# Patient Record
Sex: MALE | Race: WHITE | Employment: UNEMPLOYED | URBAN - METROPOLITAN AREA
[De-identification: names, ages, dates, MRNs, and addresses within clinical notes are randomized per-mention and may not be internally consistent; named-entity substitution may affect disease eponyms.]

---

## 2023-06-28 ENCOUNTER — APPOINTMENT (OUTPATIENT)
Dept: GENERAL RADIOLOGY | Facility: HOSPITAL | Age: 58
End: 2023-06-28
Attending: STUDENT IN AN ORGANIZED HEALTH CARE EDUCATION/TRAINING PROGRAM
Payer: MEDICAID

## 2023-06-28 ENCOUNTER — HOSPITAL ENCOUNTER (INPATIENT)
Facility: HOSPITAL | Age: 58
End: 2023-06-28
Attending: STUDENT IN AN ORGANIZED HEALTH CARE EDUCATION/TRAINING PROGRAM | Admitting: HOSPITALIST
Payer: MEDICAID

## 2023-06-28 ENCOUNTER — HOSPITAL ENCOUNTER (INPATIENT)
Facility: HOSPITAL | Age: 58
LOS: 6 days | Discharge: HOME | End: 2023-07-04
Attending: STUDENT IN AN ORGANIZED HEALTH CARE EDUCATION/TRAINING PROGRAM | Admitting: HOSPITALIST
Payer: MEDICAID

## 2023-06-28 DIAGNOSIS — K92.2 UPPER GI BLEED: Primary | ICD-10-CM

## 2023-06-28 LAB
ALBUMIN SERPL-MCNC: 2.9 G/DL (ref 3.4–5)
ALBUMIN/GLOB SERPL: 0.8 {RATIO} (ref 1–2)
ALP LIVER SERPL-CCNC: 85 U/L
ALT SERPL-CCNC: 15 U/L
ANION GAP SERPL CALC-SCNC: 6 MMOL/L (ref 0–18)
ANTIBODY SCREEN: NEGATIVE
AST SERPL-CCNC: 8 U/L (ref 15–37)
BILIRUB SERPL-MCNC: 0.4 MG/DL (ref 0.1–2)
BUN BLD-MCNC: 15 MG/DL (ref 7–18)
BUN/CREAT SERPL: 21.4 (ref 10–20)
CALCIUM BLD-MCNC: 8.2 MG/DL (ref 8.5–10.1)
CHLORIDE SERPL-SCNC: 109 MMOL/L (ref 98–112)
CO2 SERPL-SCNC: 24 MMOL/L (ref 21–32)
CREAT BLD-MCNC: 0.7 MG/DL
GFR SERPLBLD BASED ON 1.73 SQ M-ARVRAT: 107 ML/MIN/1.73M2 (ref 60–?)
GLOBULIN PLAS-MCNC: 3.8 G/DL (ref 2.8–4.4)
GLUCOSE BLD-MCNC: 108 MG/DL (ref 70–99)
GLUCOSE BLDC GLUCOMTR-MCNC: 97 MG/DL (ref 70–99)
OSMOLALITY SERPL CALC.SUM OF ELEC: 289 MOSM/KG (ref 275–295)
POTASSIUM SERPL-SCNC: 4.1 MMOL/L (ref 3.5–5.1)
PROT SERPL-MCNC: 6.7 G/DL (ref 6.4–8.2)
RH BLOOD TYPE: POSITIVE
RH BLOOD TYPE: POSITIVE
SODIUM SERPL-SCNC: 139 MMOL/L (ref 136–145)

## 2023-06-28 PROCEDURE — 30233N1 TRANSFUSION OF NONAUTOLOGOUS RED BLOOD CELLS INTO PERIPHERAL VEIN, PERCUTANEOUS APPROACH: ICD-10-PCS | Performed by: STUDENT IN AN ORGANIZED HEALTH CARE EDUCATION/TRAINING PROGRAM

## 2023-06-28 PROCEDURE — 99223 1ST HOSP IP/OBS HIGH 75: CPT | Performed by: HOSPITALIST

## 2023-06-28 PROCEDURE — 71045 X-RAY EXAM CHEST 1 VIEW: CPT | Performed by: STUDENT IN AN ORGANIZED HEALTH CARE EDUCATION/TRAINING PROGRAM

## 2023-06-28 RX ORDER — PROCHLORPERAZINE EDISYLATE 5 MG/ML
5 INJECTION INTRAMUSCULAR; INTRAVENOUS EVERY 8 HOURS PRN
Status: DISCONTINUED | OUTPATIENT
Start: 2023-06-28 | End: 2023-06-30

## 2023-06-28 RX ORDER — TEMAZEPAM 15 MG/1
15 CAPSULE ORAL NIGHTLY PRN
Status: DISCONTINUED | OUTPATIENT
Start: 2023-06-28 | End: 2023-06-30

## 2023-06-28 RX ORDER — ACETAMINOPHEN 500 MG
500 TABLET ORAL EVERY 4 HOURS PRN
Status: DISCONTINUED | OUTPATIENT
Start: 2023-06-28 | End: 2023-06-30

## 2023-06-28 RX ORDER — SODIUM CHLORIDE 9 MG/ML
INJECTION, SOLUTION INTRAVENOUS CONTINUOUS
Status: DISCONTINUED | OUTPATIENT
Start: 2023-06-28 | End: 2023-06-30

## 2023-06-28 RX ORDER — ACETAMINOPHEN 500 MG
1000 TABLET ORAL ONCE
Status: COMPLETED | OUTPATIENT
Start: 2023-06-28 | End: 2023-06-28

## 2023-06-28 RX ORDER — ONDANSETRON 2 MG/ML
4 INJECTION INTRAMUSCULAR; INTRAVENOUS EVERY 6 HOURS PRN
Status: DISCONTINUED | OUTPATIENT
Start: 2023-06-28 | End: 2023-06-30

## 2023-06-28 NOTE — H&P
[facility]    PATIENT'S NAME: Balbir Villanueva   ATTENDING PHYSICIAN: Jaosn Cheema MD   PATIENT ACCOUNT#:   [de-identified]    LOCATION:  [address]  MEDICAL RECORD #:   [id number]       YOB: 1965  ADMISSION DATE:       06/28/2023    HISTORY AND PHYSICAL EXAMINATION    CHIEF COMPLAINT:  Upper gastrointestinal bleed and profound posthemorrhagic anemia. HISTORY OF PRESENT ILLNESS:  Patient is a 45-year-old  male with recurrent gastrointestinal bleed since December 2022, has been having progressive fatigue and weakness, and he was sent today to the emergency department at [place] for further evaluation. CBC today showed hemoglobin 4.6, white blood cell count 11.1, platelet count 446, MCV low at 74.0. Chemistry showed BUN-to-creatinine ratio of 21.4. Patient was started on IV Protonix and blood transfusion, and he will be admitted to the hospital for further management. Chest x-ray showed no acute findings. PAST MEDICAL HISTORY:  Patient has a history of right shoulder malignant melanoma, status post surgical excision, skin grafting, followed by chemoradiation therapy. He had recurrent gastrointestinal bleed at least documented since November 2022 with upper endoscopy showing only hiatal hernia, recurrent hospitalizations with multiple blood transfusions. He has not been compliant with followup with Gastroenterology to receive a capsule endoscopy. He has borderline diabetes. Otherwise denies any medical problems. PAST SURGICAL HISTORY:  As mentioned above. ALLERGIES:  No known drug allergies. FAMILY HISTORY:  Positive for hypertension. SOCIAL HISTORY:  No tobacco, alcohol, or drug use. Lives with his family. REVIEW OF SYSTEMS:  Difficult to obtain from the patient. He has no complaints except progressive fatigue. He cannot tell me if he had melena. No nausea or vomiting. Other 12-point review of systems is unobtainable.         PHYSICAL EXAMINATION: GENERAL:  Alert and oriented to time, place and person. Pale in color. No acute distress. VITAL SIGNS:  Temperature 100.3, pulse 87, respiratory rate 18, blood pressure 95/61, pulse ox 99% on room air. HEENT:  Atraumatic. Oropharynx clear. Dry mucous membranes. Normal hard and soft palate. Eyes:  Anicteric sclerae. NECK:  Supple. No lymphadenopathy. Trachea midline. Full range of motion. LUNGS:  Clear to auscultation bilaterally. Normal respiratory effort. HEART:  Regular rate and rhythm. S1 and S2 auscultated. No murmur. Slightly tachycardic. ABDOMEN:  Soft, nondistended. No tenderness. Positive bowel sounds. EXTREMITIES:  No peripheral edema, clubbing or cyanosis. NEUROLOGIC:  Motor and sensory intact. ASSESSMENT:    1. Posthemorrhagic anemia. 2.   Microcytic anemia with iron deficiency and recurrent gastrointestinal blood loss, upper source but negative EGD a few months ago. PLAN:  Patient will be admitted to general medical floor. Blood transfusion. IV Protonix. Monitor hemodynamic status. Obtain gastroenterology consult. Further recommendations to follow.      Dictated By Ash Silva MD  d: 06/28/2023 18:32:23  t: 06/28/2023 18:40:41  Job [id number]  RT/    cc: Hilary Quevedo MD

## 2023-06-28 NOTE — ED INITIAL ASSESSMENT (HPI)
Patient with c/o mid epigastric pain, dizziness. Nausea, and fatigue that began yesterday.  Hx GI bleed and PUD

## 2023-06-28 NOTE — ED QUICK NOTES
Orders for admission, patient is aware of plan and ready to go upstairs. Any questions, please call ED RN Belen at [address].      Patient Covid vaccination status: Unvaccinated     COVID Test Ordered in ED: None    COVID Suspicion at Admission: N/A    Running Infusions:      Mental Status/LOC at time of transport: A&Ox4/4    Other pertinent information: Nicaraguan speaking   CIWA score: N/A   NIH score:  N/A

## 2023-06-29 ENCOUNTER — ANESTHESIA EVENT (OUTPATIENT)
Dept: ENDOSCOPY | Facility: HOSPITAL | Age: 58
End: 2023-06-29
Payer: MEDICAID

## 2023-06-29 ENCOUNTER — APPOINTMENT (OUTPATIENT)
Dept: CT IMAGING | Facility: HOSPITAL | Age: 58
End: 2023-06-29
Attending: INTERNAL MEDICINE
Payer: MEDICAID

## 2023-06-29 ENCOUNTER — ANESTHESIA (OUTPATIENT)
Dept: ENDOSCOPY | Facility: HOSPITAL | Age: 58
End: 2023-06-29
Payer: MEDICAID

## 2023-06-29 LAB
ANION GAP SERPL CALC-SCNC: 6 MMOL/L (ref 0–18)
ATRIAL RATE: 88 BPM
BASOPHILS # BLD AUTO: 0.07 X10(3) UL (ref 0–0.2)
BASOPHILS # BLD AUTO: 0.08 X10(3) UL (ref 0–0.2)
BASOPHILS NFR BLD AUTO: 0.6 %
BASOPHILS NFR BLD AUTO: 1 %
BUN BLD-MCNC: 11 MG/DL (ref 7–18)
BUN/CREAT SERPL: 17.7 (ref 10–20)
CALCIUM BLD-MCNC: 7.7 MG/DL (ref 8.5–10.1)
CHLORIDE SERPL-SCNC: 113 MMOL/L (ref 98–112)
CO2 SERPL-SCNC: 22 MMOL/L (ref 21–32)
CREAT BLD-MCNC: 0.62 MG/DL
DEPRECATED RDW RBC AUTO: 47.8 FL (ref 35.1–46.3)
DEPRECATED RDW RBC AUTO: 54.6 FL (ref 35.1–46.3)
EOSINOPHIL # BLD AUTO: 0.03 X10(3) UL (ref 0–0.7)
EOSINOPHIL # BLD AUTO: 0.04 X10(3) UL (ref 0–0.7)
EOSINOPHIL NFR BLD AUTO: 0.3 %
EOSINOPHIL NFR BLD AUTO: 0.5 %
ERYTHROCYTE [DISTWIDTH] IN BLOOD BY AUTOMATED COUNT: 18.1 % (ref 11–15)
ERYTHROCYTE [DISTWIDTH] IN BLOOD BY AUTOMATED COUNT: 19.3 % (ref 11–15)
GFR SERPLBLD BASED ON 1.73 SQ M-ARVRAT: 111 ML/MIN/1.73M2 (ref 60–?)
GLUCOSE BLD-MCNC: 95 MG/DL (ref 70–99)
GLUCOSE BLDC GLUCOMTR-MCNC: 92 MG/DL (ref 70–99)
HCT VFR BLD AUTO: 16.8 %
HCT VFR BLD AUTO: 21.2 %
HCT VFR BLD AUTO: 24.6 %
HGB BLD-MCNC: 4.6 G/DL
HGB BLD-MCNC: 6.3 G/DL
HGB BLD-MCNC: 7.4 G/DL
HGB BLD-MCNC: 7.6 G/DL
IMM GRANULOCYTES # BLD AUTO: 0.02 X10(3) UL (ref 0–1)
IMM GRANULOCYTES # BLD AUTO: 0.05 X10(3) UL (ref 0–1)
IMM GRANULOCYTES NFR BLD: 0.2 %
IMM GRANULOCYTES NFR BLD: 0.5 %
LYMPHOCYTES # BLD AUTO: 1.42 X10(3) UL (ref 1–4)
LYMPHOCYTES # BLD AUTO: 2.14 X10(3) UL (ref 1–4)
LYMPHOCYTES NFR BLD AUTO: 17.5 %
LYMPHOCYTES NFR BLD AUTO: 19.3 %
MCH RBC QN AUTO: 20.3 PG (ref 26–34)
MCH RBC QN AUTO: 23.2 PG (ref 26–34)
MCHC RBC AUTO-ENTMCNC: 27.4 G/DL (ref 31–37)
MCHC RBC AUTO-ENTMCNC: 29.7 G/DL (ref 31–37)
MCV RBC AUTO: 74 FL
MCV RBC AUTO: 78.2 FL
MONOCYTES # BLD AUTO: 0.89 X10(3) UL (ref 0.1–1)
MONOCYTES # BLD AUTO: 1.03 X10(3) UL (ref 0.1–1)
MONOCYTES NFR BLD AUTO: 11 %
MONOCYTES NFR BLD AUTO: 9.3 %
NEUTROPHILS # BLD AUTO: 5.67 X10 (3) UL (ref 1.5–7.7)
NEUTROPHILS # BLD AUTO: 5.67 X10(3) UL (ref 1.5–7.7)
NEUTROPHILS # BLD AUTO: 7.76 X10 (3) UL (ref 1.5–7.7)
NEUTROPHILS # BLD AUTO: 7.76 X10(3) UL (ref 1.5–7.7)
NEUTROPHILS NFR BLD AUTO: 69.8 %
NEUTROPHILS NFR BLD AUTO: 70 %
OSMOLALITY SERPL CALC.SUM OF ELEC: 291 MOSM/KG (ref 275–295)
P AXIS: 54 DEGREES
P-R INTERVAL: 186 MS
PLATELET # BLD AUTO: 449 10(3)UL (ref 150–450)
PLATELET # BLD AUTO: 604 10(3)UL (ref 150–450)
POTASSIUM SERPL-SCNC: 3.8 MMOL/L (ref 3.5–5.1)
Q-T INTERVAL: 382 MS
QRS DURATION: 92 MS
QTC CALCULATION (BEZET): 462 MS
R AXIS: 38 DEGREES
RBC # BLD AUTO: 2.27 X10(6)UL
RBC # BLD AUTO: 2.71 X10(6)UL
SODIUM SERPL-SCNC: 141 MMOL/L (ref 136–145)
T AXIS: 45 DEGREES
VENTRICULAR RATE: 88 BPM
WBC # BLD AUTO: 11.1 X10(3) UL (ref 4–11)
WBC # BLD AUTO: 8.1 X10(3) UL (ref 4–11)

## 2023-06-29 PROCEDURE — 99222 1ST HOSP IP/OBS MODERATE 55: CPT | Performed by: INTERNAL MEDICINE

## 2023-06-29 PROCEDURE — 99233 SBSQ HOSP IP/OBS HIGH 50: CPT | Performed by: HOSPITALIST

## 2023-06-29 PROCEDURE — 43235 EGD DIAGNOSTIC BRUSH WASH: CPT | Performed by: INTERNAL MEDICINE

## 2023-06-29 PROCEDURE — 74177 CT ABD & PELVIS W/CONTRAST: CPT | Performed by: INTERNAL MEDICINE

## 2023-06-29 PROCEDURE — 0DJ08ZZ INSPECTION OF UPPER INTESTINAL TRACT, VIA NATURAL OR ARTIFICIAL OPENING ENDOSCOPIC: ICD-10-PCS | Performed by: INTERNAL MEDICINE

## 2023-06-29 RX ORDER — LIDOCAINE HYDROCHLORIDE 20 MG/ML
INJECTION, SOLUTION EPIDURAL; INFILTRATION; INTRACAUDAL; PERINEURAL AS NEEDED
Status: DISCONTINUED | OUTPATIENT
Start: 2023-06-29 | End: 2023-06-29 | Stop reason: SURG

## 2023-06-29 RX ORDER — SODIUM CHLORIDE, SODIUM LACTATE, POTASSIUM CHLORIDE, CALCIUM CHLORIDE 600; 310; 30; 20 MG/100ML; MG/100ML; MG/100ML; MG/100ML
INJECTION, SOLUTION INTRAVENOUS CONTINUOUS PRN
Status: DISCONTINUED | OUTPATIENT
Start: 2023-06-29 | End: 2023-06-29 | Stop reason: SURG

## 2023-06-29 RX ORDER — GLYCOPYRROLATE 0.2 MG/ML
INJECTION, SOLUTION INTRAMUSCULAR; INTRAVENOUS AS NEEDED
Status: DISCONTINUED | OUTPATIENT
Start: 2023-06-29 | End: 2023-06-29 | Stop reason: SURG

## 2023-06-29 RX ORDER — SODIUM CHLORIDE 9 MG/ML
INJECTION, SOLUTION INTRAVENOUS ONCE
Status: COMPLETED | OUTPATIENT
Start: 2023-06-29 | End: 2023-06-29

## 2023-06-29 RX ADMIN — LIDOCAINE HYDROCHLORIDE 80 MG: 20 INJECTION, SOLUTION EPIDURAL; INFILTRATION; INTRACAUDAL; PERINEURAL at 10:56:00

## 2023-06-29 RX ADMIN — GLYCOPYRROLATE 0.2 MG: 0.2 INJECTION, SOLUTION INTRAMUSCULAR; INTRAVENOUS at 10:56:00

## 2023-06-29 RX ADMIN — SODIUM CHLORIDE, SODIUM LACTATE, POTASSIUM CHLORIDE, CALCIUM CHLORIDE: 600; 310; 30; 20 INJECTION, SOLUTION INTRAVENOUS at 10:54:00

## 2023-06-29 NOTE — OPERATIVE REPORT
Esophagogastroduodenoscopy (EGD) Report    Jose Alejandro Chavarria     1965 Age 62year old   PCP Jeanette Balderas MD Endoscopist Donna Martínez MD     Date of procedure: 23    Procedure: EGD     Pre-operative diagnosis: GI bleeding    Post-operative diagnosis: see impression    Sedation:  monitored anesthesia care (MAC)    Consent: We discussed the risks/benefits and alternatives to this procedure, as well as the planned sedation. Informed consent was obtained from the patient after the risks of the procedure were discussed, including but not limited to bleeding, perforation, aspiration, infection, or possibility of a missed lesion as well as the risks of anesthesia including but not limited to cardiopulmonary complications. The patient signed informed consent and elected to proceed with EGD with intervention [i.e. Biopsy, control of bleeding, dilatation, polypectomy, endoscopic mucosal resection, etc.] as indicated. EGD procedure: The patient was placed in the left lateral decubitus position and begun on continuous blood pressure pulse oximetry and EKG monitoring and this was maintained throughout the procedure. Once an adequate level of sedation was obtained a bite block was placed. Then the lubricated tip of the [id number] diagnostic video upper endoscope was carefully inserted and advanced using direct visualization into the posterior pharynx and ultimately into the esophagus, stomach, and duodenum. Air was then withdrawn and the endoscope was removed. The patient tolerated the procedure well. Estimated blood loss: none    Specimens collected:  none    Complications: none    EGD findings: The gastroscope was passed into the esophagus uneventfully and passed distally into the stomach toward the duodenum. Initial views of the esophagus and stomach were unremarkable without any blood or lesions. The endoscope was passed into the 2nd portion of the duodenum which showed normal bile tinged mucosa. On withdrawal into the duodenal bulb there were two possibly three small erosions/superficial ulcerations without any bleeding or stigmata of recent bleeding. The gastroscope was withdrawn back into the stomach. The stomach distended normally. Normal rugal folds were seen. The pylorus was patent. There was perhaps a diminutive mucosal non-specific nodule or perhaps healing erosion along the greater curvature in the antrum. It was not biopsied at this time. The gastric mucosa appeared unremarkable otherwise. Retroflexion revealed a normal fundus and cardia. Again, no blood or bleeding lesions seen in the stomach. The gastroscope was withdrawn into the esophagus. The squamocolumnar junction was noted at 41 and appeared unremarkable. There was a circular patch of mucosa in the proximal esophagus consistent with an esophageal inlet patch. Otherwise, the esophagus was unremarkable throughout. The gastroscope was removed and procedure completed. Impression:  No evidence of active upper GI bleeding  Two small superficial ulcers/erosions in the duodenal bulb which are not felt to be the cause of profound anemia (hgb < 5) on admit and cause of dark stools and on-going concerns for GI blood loss anemia based on patient's documented history  Other minor findings noted above    Recommend:  CT enterography - due to PET/CT from 2/2/23 at an outside institution noting 2 thickened hypermetabolic small bowel loops with one of these suspicious for intussusception.   Consider capsule endoscopy pending above  Clear liquid diet as tolerated  PPI BID  Monitor hgb and transfuse prn     Mindi Miranda MD  [address] - Gastroenterology  6/29/2023

## 2023-06-29 NOTE — PLAN OF CARE
Indonesian speaking pt admit for GIB and anemia. 4th u prbc transfused. Waiting for f/u hgb. No sign of active bleeding. No bm.  line used. Plan of care discussed with pt and pt's son at bedside. Possible endoscopy in am. Kept pt npo after midnight. Bp soft. Voids per urinal.      Problem: Patient Centered Care  Goal: Patient preferences are identified and integrated in the patient's plan of care  Description: Interventions:  - What would you like us to know as we care for you?  Pt was admit for same dx last yr  - Provide timely, complete, and accurate information to patient/family  - Incorporate patient and family knowledge, values, beliefs, and cultural backgrounds into the planning and delivery of care  - Encourage patient/family to participate in care and decision-making at the level they choose  - Honor patient and family perspectives and choices  Outcome: Progressing     Problem: Patient/Family Goals  Goal: Patient/Family Long Term Goal  Description: Patient's Long Term Goal: prevent future readmission    Interventions:  - f/u outpt  - See additional Care Plan goals for specific interventions  Outcome: Progressing  Goal: Patient/Family Short Term Goal  Description: Patient's Short Term Goal: prevent hemodynamic instability    Interventions:   - monitor bp, blood transfusion, lab  - See additional Care Plan goals for specific interventions  Outcome: Progressing     Problem: GASTROINTESTINAL - ADULT  Goal: Minimal or absence of nausea and vomiting  Description: INTERVENTIONS:  - Maintain adequate hydration with IV or PO as ordered and tolerated  - Nasogastric tube to low intermittent suction as ordered  - Evaluate effectiveness of ordered antiemetic medications  - Provide nonpharmacologic comfort measures as appropriate  - Advance diet as tolerated, if ordered  - Obtain nutritional consult as needed  - Evaluate fluid balance  Outcome: Progressing  Goal: Maintains or returns to baseline bowel function  Description: INTERVENTIONS:  - Assess bowel function  - Maintain adequate hydration with IV or PO as ordered and tolerated  - Evaluate effectiveness of GI medications  - Encourage mobilization and activity  - Obtain nutritional consult as needed  - Establish a toileting routine/schedule  - Consider collaborating with pharmacy to review patient's medication profile  Outcome: Progressing  Goal: Maintains adequate nutritional intake (undernourished)  Description: INTERVENTIONS:  - Monitor percentage of each meal consumed  - Identify factors contributing to decreased intake, treat as appropriate  - Assist with meals as needed  - Monitor I&O, WT and lab values  - Obtain nutritional consult as needed  - Optimize oral hygiene and moisture  - Encourage food from home; allow for food preferences  - Enhance eating environment  Outcome: Progressing  Goal: Achieves appropriate nutritional intake (bariatric)  Description: INTERVENTIONS:  - Monitor for over-consumption  - Identify factors contributing to increased intake, treat as appropriate  - Monitor I&O, WT and lab values  - Obtain nutritional consult as needed  - Evaluate psychosocial factors contributing to over-consumption  Outcome: Progressing     Problem: SAFETY ADULT - FALL  Goal: Free from fall injury  Description: INTERVENTIONS:  - Assess pt frequently for physical needs  - Identify cognitive and physical deficits and behaviors that affect risk of falls.   - [place] fall precautions as indicated by assessment.  - Educate pt/family on patient safety including physical limitations  - Instruct pt to call for assistance with activity based on assessment  - Modify environment to reduce risk of injury  - Provide assistive devices as appropriate  - Consider OT/PT consult to assist with strengthening/mobility  - Encourage toileting schedule  Outcome: Progressing

## 2023-06-29 NOTE — PLAN OF CARE
Endoscopy today, no interventions. Serial Hgb. CT abd. 1 black stool. Pt comfortable with frequent nurse rounding and call light in reach.    Problem: Patient Centered Care  Goal: Patient preferences are identified and integrated in the patient's plan of care  Description: Interventions:  - What would you like us to know as we care for you? : Kyrgyz speaking   - Provide timely, complete, and accurate information to patient/family  - Incorporate patient and family knowledge, values, beliefs, and cultural backgrounds into the planning and delivery of care  - Encourage patient/family to participate in care and decision-making at the level they choose  - Honor patient and family perspectives and choices  6/29/2023 1541 by Val Dotson RN  Outcome: Progressing  6/29/2023 1541 by Val Dotson RN  Outcome: Progressing     Problem: Patient/Family Goals  Goal: Patient/Family Long Term Goal  Description: Patient's Long Term Goal:    Interventions:  - See additional Care Plan goals for specific interventions  6/29/2023 1541 by Val Dotson RN  Outcome: Progressing  6/29/2023 1541 by Val Dotson RN  Outcome: Progressing  Goal: Patient/Family Short Term Goal  Description: Patient's Short Term Goal:     Interventions:   - See additional Care Plan goals for specific interventions  6/29/2023 1541 by Val Dotson RN  Outcome: Progressing  6/29/2023 1541 by Val Dotson RN  Outcome: Progressing     Problem: GASTROINTESTINAL - ADULT  Goal: Minimal or absence of nausea and vomiting  Description: INTERVENTIONS:  - Maintain adequate hydration with IV or PO as ordered and tolerated  - Nasogastric tube to low intermittent suction as ordered  - Evaluate effectiveness of ordered antiemetic medications  - Provide nonpharmacologic comfort measures as appropriate  - Advance diet as tolerated, if ordered  - Obtain nutritional consult as needed  - Evaluate fluid balance  6/29/2023 1541 by Val Dotson RN  Outcome: Progressing  6/29/2023 1541 by Clifford Galvan RN  Outcome: Progressing  Goal: Maintains or returns to baseline bowel function  Description: INTERVENTIONS:  - Assess bowel function  - Maintain adequate hydration with IV or PO as ordered and tolerated  - Evaluate effectiveness of GI medications  - Encourage mobilization and activity  - Obtain nutritional consult as needed  - Establish a toileting routine/schedule  - Consider collaborating with pharmacy to review patient's medication profile  6/29/2023 1541 by Clifford Galvan RN  Outcome: Progressing  6/29/2023 1541 by Clifford Galvan RN  Outcome: Progressing  Goal: Maintains adequate nutritional intake (undernourished)  Description: INTERVENTIONS:  - Monitor percentage of each meal consumed  - Identify factors contributing to decreased intake, treat as appropriate  - Assist with meals as needed  - Monitor I&O, WT and lab values  - Obtain nutritional consult as needed  - Optimize oral hygiene and moisture  - Encourage food from home; allow for food preferences  - Enhance eating environment  6/29/2023 1541 by Clifford Galvan RN  Outcome: Progressing  6/29/2023 1541 by Clifford Galvan RN  Outcome: Progressing  Goal: Achieves appropriate nutritional intake (bariatric)  Description: INTERVENTIONS:  - Monitor for over-consumption  - Identify factors contributing to increased intake, treat as appropriate  - Monitor I&O, WT and lab values  - Obtain nutritional consult as needed  - Evaluate psychosocial factors contributing to over-consumption  6/29/2023 1541 by Clifford Galvan RN  Outcome: Progressing  6/29/2023 1541 by Clifford Galvan RN  Outcome: Progressing     Problem: SAFETY ADULT - FALL  Goal: Free from fall injury  Description: INTERVENTIONS:  - Assess pt frequently for physical needs  - Identify cognitive and physical deficits and behaviors that affect risk of falls.   - [place] fall precautions as indicated by assessment.  - Educate pt/family on patient safety including physical limitations  - Instruct pt to call for assistance with activity based on assessment  - Modify environment to reduce risk of injury  - Provide assistive devices as appropriate  - Consider OT/PT consult to assist with strengthening/mobility  - Encourage toileting schedule  6/29/2023 1541 by Sissy Aguilar RN  Outcome: Progressing  6/29/2023 1541 by Sissy Aguilar RN  Outcome: Progressing

## 2023-06-29 NOTE — ED QUICK NOTES
Primary RN out for lunch break. Monitoring and assuming temporary care for patient. Patient has packed RBC infusing and 1L NS almost complete. Plan is for patient to be admitted to inpatient unit. Will continue to monitor.

## 2023-06-30 ENCOUNTER — ANESTHESIA EVENT (OUTPATIENT)
Dept: SURGERY | Facility: HOSPITAL | Age: 58
End: 2023-06-30
Payer: MEDICAID

## 2023-06-30 ENCOUNTER — ANESTHESIA (OUTPATIENT)
Dept: SURGERY | Facility: HOSPITAL | Age: 58
End: 2023-06-30
Payer: MEDICAID

## 2023-06-30 LAB
BLOOD TYPE BARCODE: 6200
BLOOD TYPE BARCODE: 6200
HCT VFR BLD AUTO: 24 %
HGB BLD-MCNC: 7.3 G/DL
UNIT VOLUME: 350 ML
UNIT VOLUME: 350 ML

## 2023-06-30 PROCEDURE — 0DBU0ZZ EXCISION OF OMENTUM, OPEN APPROACH: ICD-10-PCS | Performed by: SURGERY

## 2023-06-30 PROCEDURE — 3078F DIAST BP <80 MM HG: CPT | Performed by: SURGERY

## 2023-06-30 PROCEDURE — 0DB80ZZ EXCISION OF SMALL INTESTINE, OPEN APPROACH: ICD-10-PCS | Performed by: SURGERY

## 2023-06-30 PROCEDURE — 3074F SYST BP LT 130 MM HG: CPT | Performed by: SURGERY

## 2023-06-30 PROCEDURE — 99233 SBSQ HOSP IP/OBS HIGH 50: CPT | Performed by: HOSPITALIST

## 2023-06-30 PROCEDURE — 99255 IP/OBS CONSLTJ NEW/EST HI 80: CPT | Performed by: SURGERY

## 2023-06-30 PROCEDURE — 07BB0ZZ EXCISION OF MESENTERIC LYMPHATIC, OPEN APPROACH: ICD-10-PCS | Performed by: SURGERY

## 2023-06-30 PROCEDURE — 3008F BODY MASS INDEX DOCD: CPT | Performed by: SURGERY

## 2023-06-30 PROCEDURE — 99232 SBSQ HOSP IP/OBS MODERATE 35: CPT | Performed by: INTERNAL MEDICINE

## 2023-06-30 RX ORDER — HYDROCODONE BITARTRATE AND ACETAMINOPHEN 5; 325 MG/1; MG/1
1 TABLET ORAL EVERY 4 HOURS PRN
Status: DISCONTINUED | OUTPATIENT
Start: 2023-06-30 | End: 2023-07-04

## 2023-06-30 RX ORDER — SODIUM CHLORIDE, SODIUM LACTATE, POTASSIUM CHLORIDE, CALCIUM CHLORIDE 600; 310; 30; 20 MG/100ML; MG/100ML; MG/100ML; MG/100ML
INJECTION, SOLUTION INTRAVENOUS CONTINUOUS
Status: DISCONTINUED | OUTPATIENT
Start: 2023-06-30 | End: 2023-07-01 | Stop reason: ALTCHOICE

## 2023-06-30 RX ORDER — HYDROMORPHONE HYDROCHLORIDE 1 MG/ML
0.8 INJECTION, SOLUTION INTRAMUSCULAR; INTRAVENOUS; SUBCUTANEOUS EVERY 2 HOUR PRN
Status: DISCONTINUED | OUTPATIENT
Start: 2023-06-30 | End: 2023-06-30 | Stop reason: ALTCHOICE

## 2023-06-30 RX ORDER — NALOXONE HYDROCHLORIDE 0.4 MG/ML
80 INJECTION, SOLUTION INTRAMUSCULAR; INTRAVENOUS; SUBCUTANEOUS AS NEEDED
Status: ACTIVE | OUTPATIENT
Start: 2023-06-30 | End: 2023-07-01

## 2023-06-30 RX ORDER — MORPHINE SULFATE 4 MG/ML
2 INJECTION, SOLUTION INTRAMUSCULAR; INTRAVENOUS EVERY 10 MIN PRN
Status: DISCONTINUED | OUTPATIENT
Start: 2023-06-30 | End: 2023-07-01 | Stop reason: ALTCHOICE

## 2023-06-30 RX ORDER — HYDROMORPHONE HYDROCHLORIDE 1 MG/ML
0.4 INJECTION, SOLUTION INTRAMUSCULAR; INTRAVENOUS; SUBCUTANEOUS EVERY 5 MIN PRN
Status: DISCONTINUED | OUTPATIENT
Start: 2023-06-30 | End: 2023-07-01 | Stop reason: ALTCHOICE

## 2023-06-30 RX ORDER — HYDROMORPHONE HYDROCHLORIDE 1 MG/ML
0.4 INJECTION, SOLUTION INTRAMUSCULAR; INTRAVENOUS; SUBCUTANEOUS EVERY 2 HOUR PRN
Status: DISCONTINUED | OUTPATIENT
Start: 2023-06-30 | End: 2023-06-30 | Stop reason: ALTCHOICE

## 2023-06-30 RX ORDER — ROCURONIUM BROMIDE 10 MG/ML
INJECTION, SOLUTION INTRAVENOUS AS NEEDED
Status: DISCONTINUED | OUTPATIENT
Start: 2023-06-30 | End: 2023-06-30 | Stop reason: SURG

## 2023-06-30 RX ORDER — HEPARIN SODIUM 5000 [USP'U]/ML
5000 INJECTION, SOLUTION INTRAVENOUS; SUBCUTANEOUS EVERY 8 HOURS SCHEDULED
Status: DISCONTINUED | OUTPATIENT
Start: 2023-07-01 | End: 2023-07-01

## 2023-06-30 RX ORDER — SODIUM CHLORIDE, SODIUM LACTATE, POTASSIUM CHLORIDE, CALCIUM CHLORIDE 600; 310; 30; 20 MG/100ML; MG/100ML; MG/100ML; MG/100ML
INJECTION, SOLUTION INTRAVENOUS CONTINUOUS
Status: DISCONTINUED | OUTPATIENT
Start: 2023-06-30 | End: 2023-07-02

## 2023-06-30 RX ORDER — HYDROMORPHONE HYDROCHLORIDE 1 MG/ML
0.2 INJECTION, SOLUTION INTRAMUSCULAR; INTRAVENOUS; SUBCUTANEOUS EVERY 2 HOUR PRN
Status: DISCONTINUED | OUTPATIENT
Start: 2023-06-30 | End: 2023-06-30 | Stop reason: ALTCHOICE

## 2023-06-30 RX ORDER — ONDANSETRON 2 MG/ML
4 INJECTION INTRAMUSCULAR; INTRAVENOUS EVERY 6 HOURS PRN
Status: DISCONTINUED | OUTPATIENT
Start: 2023-06-30 | End: 2023-07-02

## 2023-06-30 RX ORDER — DIPHENHYDRAMINE HYDROCHLORIDE 50 MG/ML
12.5 INJECTION INTRAMUSCULAR; INTRAVENOUS EVERY 4 HOURS PRN
Status: DISCONTINUED | OUTPATIENT
Start: 2023-06-30 | End: 2023-07-02

## 2023-06-30 RX ORDER — GLYCOPYRROLATE 0.2 MG/ML
INJECTION, SOLUTION INTRAMUSCULAR; INTRAVENOUS AS NEEDED
Status: DISCONTINUED | OUTPATIENT
Start: 2023-06-30 | End: 2023-06-30 | Stop reason: SURG

## 2023-06-30 RX ORDER — LIDOCAINE HYDROCHLORIDE 10 MG/ML
INJECTION, SOLUTION EPIDURAL; INFILTRATION; INTRACAUDAL; PERINEURAL AS NEEDED
Status: DISCONTINUED | OUTPATIENT
Start: 2023-06-30 | End: 2023-06-30 | Stop reason: SURG

## 2023-06-30 RX ORDER — NALOXONE HYDROCHLORIDE 0.4 MG/ML
0.08 INJECTION, SOLUTION INTRAMUSCULAR; INTRAVENOUS; SUBCUTANEOUS
Status: DISCONTINUED | OUTPATIENT
Start: 2023-06-30 | End: 2023-07-02

## 2023-06-30 RX ORDER — PROCHLORPERAZINE EDISYLATE 5 MG/ML
5 INJECTION INTRAMUSCULAR; INTRAVENOUS EVERY 8 HOURS PRN
Status: DISCONTINUED | OUTPATIENT
Start: 2023-06-30 | End: 2023-07-04

## 2023-06-30 RX ORDER — DEXAMETHASONE SODIUM PHOSPHATE 4 MG/ML
VIAL (ML) INJECTION AS NEEDED
Status: DISCONTINUED | OUTPATIENT
Start: 2023-06-30 | End: 2023-06-30 | Stop reason: SURG

## 2023-06-30 RX ORDER — MORPHINE SULFATE 10 MG/ML
6 INJECTION, SOLUTION INTRAMUSCULAR; INTRAVENOUS EVERY 10 MIN PRN
Status: DISCONTINUED | OUTPATIENT
Start: 2023-06-30 | End: 2023-07-01 | Stop reason: ALTCHOICE

## 2023-06-30 RX ORDER — MORPHINE SULFATE 4 MG/ML
4 INJECTION, SOLUTION INTRAMUSCULAR; INTRAVENOUS EVERY 10 MIN PRN
Status: DISCONTINUED | OUTPATIENT
Start: 2023-06-30 | End: 2023-07-01 | Stop reason: ALTCHOICE

## 2023-06-30 RX ORDER — MAGNESIUM HYDROXIDE 1200 MG/15ML
LIQUID ORAL CONTINUOUS PRN
Status: DISCONTINUED | OUTPATIENT
Start: 2023-06-30 | End: 2023-07-04

## 2023-06-30 RX ORDER — HYDROMORPHONE HYDROCHLORIDE 1 MG/ML
0.2 INJECTION, SOLUTION INTRAMUSCULAR; INTRAVENOUS; SUBCUTANEOUS EVERY 5 MIN PRN
Status: DISCONTINUED | OUTPATIENT
Start: 2023-06-30 | End: 2023-07-01 | Stop reason: ALTCHOICE

## 2023-06-30 RX ORDER — HYDROMORPHONE HYDROCHLORIDE 1 MG/ML
0.6 INJECTION, SOLUTION INTRAMUSCULAR; INTRAVENOUS; SUBCUTANEOUS EVERY 5 MIN PRN
Status: DISCONTINUED | OUTPATIENT
Start: 2023-06-30 | End: 2023-07-01 | Stop reason: ALTCHOICE

## 2023-06-30 RX ORDER — CEFOXITIN 2 G/1
INJECTION, POWDER, FOR SOLUTION INTRAVENOUS AS NEEDED
Status: DISCONTINUED | OUTPATIENT
Start: 2023-06-30 | End: 2023-06-30 | Stop reason: SURG

## 2023-06-30 RX ORDER — ONDANSETRON 2 MG/ML
INJECTION INTRAMUSCULAR; INTRAVENOUS AS NEEDED
Status: DISCONTINUED | OUTPATIENT
Start: 2023-06-30 | End: 2023-06-30 | Stop reason: SURG

## 2023-06-30 RX ORDER — HYDROCODONE BITARTRATE AND ACETAMINOPHEN 5; 325 MG/1; MG/1
2 TABLET ORAL EVERY 4 HOURS PRN
Status: DISCONTINUED | OUTPATIENT
Start: 2023-06-30 | End: 2023-07-04

## 2023-06-30 RX ORDER — SODIUM CHLORIDE 9 MG/ML
INJECTION, SOLUTION INTRAVENOUS CONTINUOUS
Status: DISCONTINUED | OUTPATIENT
Start: 2023-06-30 | End: 2023-07-02

## 2023-06-30 RX ORDER — ACETAMINOPHEN 325 MG/1
650 TABLET ORAL EVERY 4 HOURS PRN
Status: DISCONTINUED | OUTPATIENT
Start: 2023-06-30 | End: 2023-07-04

## 2023-06-30 RX ORDER — ONDANSETRON 2 MG/ML
4 INJECTION INTRAMUSCULAR; INTRAVENOUS EVERY 6 HOURS PRN
Status: DISCONTINUED | OUTPATIENT
Start: 2023-06-30 | End: 2023-07-04

## 2023-06-30 RX ORDER — SODIUM CHLORIDE 9 MG/ML
INJECTION, SOLUTION INTRAVENOUS CONTINUOUS PRN
Status: DISCONTINUED | OUTPATIENT
Start: 2023-06-30 | End: 2023-06-30 | Stop reason: SURG

## 2023-06-30 RX ORDER — SODIUM CHLORIDE, SODIUM LACTATE, POTASSIUM CHLORIDE, CALCIUM CHLORIDE 600; 310; 30; 20 MG/100ML; MG/100ML; MG/100ML; MG/100ML
INJECTION, SOLUTION INTRAVENOUS CONTINUOUS
Status: DISCONTINUED | OUTPATIENT
Start: 2023-06-30 | End: 2023-06-30

## 2023-06-30 RX ADMIN — GLYCOPYRROLATE 0.2 MG: 0.2 INJECTION, SOLUTION INTRAMUSCULAR; INTRAVENOUS at 16:41:00

## 2023-06-30 RX ADMIN — SODIUM CHLORIDE: 9 INJECTION, SOLUTION INTRAVENOUS at 17:36:00

## 2023-06-30 RX ADMIN — SODIUM CHLORIDE: 9 INJECTION, SOLUTION INTRAVENOUS at 16:56:00

## 2023-06-30 RX ADMIN — ONDANSETRON 4 MG: 2 INJECTION INTRAMUSCULAR; INTRAVENOUS at 16:41:00

## 2023-06-30 RX ADMIN — LIDOCAINE HYDROCHLORIDE 50 MG: 10 INJECTION, SOLUTION EPIDURAL; INFILTRATION; INTRACAUDAL; PERINEURAL at 16:41:00

## 2023-06-30 RX ADMIN — DEXAMETHASONE SODIUM PHOSPHATE 4 MG: 4 MG/ML VIAL (ML) INJECTION at 16:41:00

## 2023-06-30 RX ADMIN — CEFOXITIN 2 G: 2 INJECTION, POWDER, FOR SOLUTION INTRAVENOUS at 17:01:00

## 2023-06-30 RX ADMIN — ROCURONIUM BROMIDE 40 MG: 10 INJECTION, SOLUTION INTRAVENOUS at 16:57:00

## 2023-06-30 NOTE — BRIEF OP NOTE
Pre-Operative Diagnosis: upper gastro intestinal bleeding     Post-Operative Diagnosis: upper gastro intestinal bleeding      Procedure Performed:   EXPLORATORY LAPAROTOMY WITH SMALL BOWEL RESECTION AND PARTIAL OMENTECTOMY    Surgeon(s) and Role:     * Bev Chow MD - Primary    Assistant(s):  Surgical Assistant.: Ash Calvo     Surgical Findings: two bowel masses     Specimen: SB     Estimated Blood Loss: Blood Output: 50 mL (6/30/2023  5:36 PM)      Dictation Number:  NA      Anderson Mayogra MD  6/30/2023  5:45 PM

## 2023-06-30 NOTE — PLAN OF CARE
Problem: Patient/Family Goals  Goal: Patient/Family Long Term Goal  Description: Patient's Long Term Goal    Interventions:    - See additional Care Plan goals for specific interventions  Outcome: Progressing  Goal: Patient/Family Short Term Goal  Description: Patient's Short Term Goal:    Interventions:   - See additional Care Plan goals for specific interventions  Outcome: Progressing     Problem: SAFETY ADULT - FALL  Goal: Free from fall injury  Description: INTERVENTIONS:  - Assess pt frequently for physical needs  - Identify cognitive and physical deficits and behaviors that affect risk of falls. - [place] fall precautions as indicated by assessment.  - Educate pt/family on patient safety including physical limitations  - Instruct pt to call for assistance with activity based on assessment  - Modify environment to reduce risk of injury  - Provide assistive devices as appropriate  - Consider OT/PT consult to assist with strengthening/mobility  - Encourage toileting schedule  Outcome: Progressing     Problem: CARDIOVASCULAR - ADULT  Goal: Maintains optimal cardiac output and hemodynamic stability  Description: INTERVENTIONS:  - Monitor vital signs, rhythm, and trends  - Monitor for bleeding, hypotension and signs of decreased cardiac output  - Evaluate effectiveness of vasoactive medications to optimize hemodynamic stability  - Monitor arterial and/or venous puncture sites for bleeding and/or hematoma  - Assess quality of pulses, skin color and temperature  - Assess for signs of decreased coronary artery perfusion - ex.  Angina  - Evaluate fluid balance, assess for edema, trend weights  Outcome: Progressing     Problem: RESPIRATORY - ADULT  Goal: Achieves optimal ventilation and oxygenation  Description: INTERVENTIONS:  - Assess for changes in respiratory status  - Assess for changes in mentation and behavior  - Position to facilitate oxygenation and minimize respiratory effort  - Oxygen supplementation based on oxygen saturation or ABGs  - Provide Smoking Cessation handout, if applicable  - Encourage broncho-pulmonary hygiene including cough, deep breathe, Incentive Spirometry  - Assess the need for suctioning and perform as needed  - Assess and instruct to report SOB or any respiratory difficulty  - Respiratory Therapy support as indicated  - Manage/alleviate anxiety  - Monitor for signs/symptoms of CO2 retention  Outcome: Progressing     Problem: SKIN/TISSUE INTEGRITY - ADULT  Goal: Skin integrity remains intact  Description: INTERVENTIONS  - Assess and document risk factors for pressure ulcer development  - Assess and document skin integrity  - Monitor for areas of redness and/or skin breakdown  - Initiate interventions, skin care algorithm/standards of care as needed  Outcome: Progressing

## 2023-06-30 NOTE — PROGRESS NOTES
Asked to review imaging by hospitalist for potential biopsy  CT enterography reviewed with Dr Chinedu Simms. There is an accessible perinephric lymph node.   Biopsy can be performed when patient is more stable  Pending oncology evaluation

## 2023-06-30 NOTE — PLAN OF CARE
Patient is resting in bed. Alert and oriented. Pt comfortable. Pt Georgian speaking. Using urinal throughout the night, no BM. Bed in lowest and locked position.  line outside room.      Problem: Patient Centered Care  Goal: Patient preferences are identified and integrated in the patient's plan of care  Description: Interventions:  - What would you like us to know as we care for you?   - Provide timely, complete, and accurate information to patient/family  - Incorporate patient and family knowledge, values, beliefs, and cultural backgrounds into the planning and delivery of care  - Encourage patient/family to participate in care and decision-making at the level they choose  - Honor patient and family perspectives and choices  Outcome: Progressing     Problem: Patient/Family Goals  Goal: Patient/Family Long Term Goal  Description: Patient's Long Term Goal:     Interventions:  -   - See additional Care Plan goals for specific interventions  Outcome: Progressing  Goal: Patient/Family Short Term Goal  Description: Patient's Short Term Goal:     Interventions:   -   - See additional Care Plan goals for specific interventions  Outcome: Progressing     Problem: GASTROINTESTINAL - ADULT  Goal: Minimal or absence of nausea and vomiting  Description: INTERVENTIONS:  - Maintain adequate hydration with IV or PO as ordered and tolerated  - Nasogastric tube to low intermittent suction as ordered  - Evaluate effectiveness of ordered antiemetic medications  - Provide nonpharmacologic comfort measures as appropriate  - Advance diet as tolerated, if ordered  - Obtain nutritional consult as needed  - Evaluate fluid balance  Outcome: Progressing  Goal: Maintains or returns to baseline bowel function  Description: INTERVENTIONS:  - Assess bowel function  - Maintain adequate hydration with IV or PO as ordered and tolerated  - Evaluate effectiveness of GI medications  - Encourage mobilization and activity  - Obtain nutritional consult as needed  - Establish a toileting routine/schedule  - Consider collaborating with pharmacy to review patient's medication profile  Outcome: Progressing  Goal: Maintains adequate nutritional intake (undernourished)  Description: INTERVENTIONS:  - Monitor percentage of each meal consumed  - Identify factors contributing to decreased intake, treat as appropriate  - Assist with meals as needed  - Monitor I&O, WT and lab values  - Obtain nutritional consult as needed  - Optimize oral hygiene and moisture  - Encourage food from home; allow for food preferences  - Enhance eating environment  Outcome: Progressing  Goal: Achieves appropriate nutritional intake (bariatric)  Description: INTERVENTIONS:  - Monitor for over-consumption  - Identify factors contributing to increased intake, treat as appropriate  - Monitor I&O, WT and lab values  - Obtain nutritional consult as needed  - Evaluate psychosocial factors contributing to over-consumption  Outcome: Progressing     Problem: SAFETY ADULT - FALL  Goal: Free from fall injury  Description: INTERVENTIONS:  - Assess pt frequently for physical needs  - Identify cognitive and physical deficits and behaviors that affect risk of falls.   - [place] fall precautions as indicated by assessment.  - Educate pt/family on patient safety including physical limitations  - Instruct pt to call for assistance with activity based on assessment  - Modify environment to reduce risk of injury  - Provide assistive devices as appropriate  - Consider OT/PT consult to assist with strengthening/mobility  - Encourage toileting schedule  Outcome: Progressing

## 2023-07-01 PROBLEM — C43.9 MELANOMA (HCC): Status: ACTIVE | Noted: 2021-06-01

## 2023-07-01 LAB
ANION GAP SERPL CALC-SCNC: 9 MMOL/L (ref 0–18)
BUN BLD-MCNC: 10 MG/DL (ref 7–18)
BUN/CREAT SERPL: 14.9 (ref 10–20)
CALCIUM BLD-MCNC: 7.9 MG/DL (ref 8.5–10.1)
CHLORIDE SERPL-SCNC: 105 MMOL/L (ref 98–112)
CO2 SERPL-SCNC: 23 MMOL/L (ref 21–32)
CREAT BLD-MCNC: 0.67 MG/DL
DEPRECATED RDW RBC AUTO: 56.9 FL (ref 35.1–46.3)
ERYTHROCYTE [DISTWIDTH] IN BLOOD BY AUTOMATED COUNT: 19.9 % (ref 11–15)
GFR SERPLBLD BASED ON 1.73 SQ M-ARVRAT: 108 ML/MIN/1.73M2 (ref 60–?)
GLUCOSE BLD-MCNC: 191 MG/DL (ref 70–99)
HCT VFR BLD AUTO: 27.3 %
HGB BLD-MCNC: 8.3 G/DL
MCH RBC QN AUTO: 24.1 PG (ref 26–34)
MCHC RBC AUTO-ENTMCNC: 30.4 G/DL (ref 31–37)
MCV RBC AUTO: 79.4 FL
OSMOLALITY SERPL CALC.SUM OF ELEC: 288 MOSM/KG (ref 275–295)
PLATELET # BLD AUTO: 494 10(3)UL (ref 150–450)
POTASSIUM SERPL-SCNC: 3.8 MMOL/L (ref 3.5–5.1)
RBC # BLD AUTO: 3.44 X10(6)UL
SODIUM SERPL-SCNC: 137 MMOL/L (ref 136–145)
WBC # BLD AUTO: 16.4 X10(3) UL (ref 4–11)

## 2023-07-01 PROCEDURE — 99233 SBSQ HOSP IP/OBS HIGH 50: CPT | Performed by: HOSPITALIST

## 2023-07-01 PROCEDURE — 99254 IP/OBS CNSLTJ NEW/EST MOD 60: CPT | Performed by: INTERNAL MEDICINE

## 2023-07-01 PROCEDURE — 99232 SBSQ HOSP IP/OBS MODERATE 35: CPT | Performed by: INTERNAL MEDICINE

## 2023-07-01 RX ORDER — HEPARIN SODIUM 5000 [USP'U]/ML
5000 INJECTION, SOLUTION INTRAVENOUS; SUBCUTANEOUS EVERY 8 HOURS SCHEDULED
Status: DISCONTINUED | OUTPATIENT
Start: 2023-07-01 | End: 2023-07-04

## 2023-07-01 NOTE — OPERATIVE REPORT
Date of operation 6/30/2023    Preoperative diagnosis:  1. Small bowel masses, gastrointestinal bleed    Operations performed:  1. Exploratory laparotomy, resection of intra-abdominal masses with en bloc small bowel resection  2. Partial omentectomy    Postoperative diagnosis:  1. Chronic bowel obstruction    Operating surgeon:  1. Anish Brown MD    Assistant:  1. Indications:  43-year-old gentleman with a history of metastatic melanoma. He was admitted with anemia and hematochezia. Upper and lower endoscopies were unremarkable. A CT scan demonstrated small bowel masses. He presents for surgical management. Details of the operation:  The patient was brought to the operating room and laid supine on the operating table. General tracheal anesthesia was induced without complications. All pressure points were padded appropriately. The arms were tucked. A Ricketts's catheter was inserted under sterile conditions. The abdomen was clipped prepped and draped in the standard sterile manner. A timeout was completed verifying the patient's name, procedure to be performed and administration of antibiotics. Everybody in the room was in agreement. Midline incision was made from above the umbilicus towards the infraumbilical region. This was deepened through subcutaneous tissue with electrocautery. The fascia was incised at the linea alba and abdomen entered atraumatically. Loose adhesions were taken down sharply. An Stanley wound retractor was placed. Exploration of the abdomen revealed 2 small bowel masses. 1 was adherent to a portion of the omentum and the other was adherent to the cecum. A partial omentectomy was performed using the harmonic device and the way to be included with the specimen. The other mass was dissected off of the cecum sharply. Proximal to the most proximal mass, there was bowel dilatation suggesting chronic bowel obstruction.   At that point, decision was made to resect both masses [first measured 7 x 5 cm, second measured 8 x 4 cm]. Antimesenteric windows were created and a blue load 60 mm stapler was used to divide the bowel. The mesentery was taken with the harmonic. A side-to-side antiperistaltic stapled anastomosis was fashioned by firing a 60 mm blue load stapler across. The common enterotomy was closed by running a 3-0 PDS suture. Additional imbricating Lembert 3-0 silk sutures were placed to reinforce the anastomosis. Hemostasis was excellent. Counts were correct. The fascia was closed in a running manner using #1 looped PDS suture. The skin and subcutaneous tissue were irrigated. The skin was approximated with a stapler. The patient was awakened and taken to the postoperative anesthesia care into the stable state. I was present for the entire case    Chiquita Pearl MD  Complex General Surgical Oncology  [facility]  Pager [phone]  [email or web address]

## 2023-07-02 LAB
ANION GAP SERPL CALC-SCNC: 6 MMOL/L (ref 0–18)
BASOPHILS # BLD AUTO: 0.05 X10(3) UL (ref 0–0.2)
BASOPHILS NFR BLD AUTO: 0.5 %
BUN BLD-MCNC: 13 MG/DL (ref 7–18)
BUN/CREAT SERPL: 21.3 (ref 10–20)
CALCIUM BLD-MCNC: 7.8 MG/DL (ref 8.5–10.1)
CHLORIDE SERPL-SCNC: 107 MMOL/L (ref 98–112)
CO2 SERPL-SCNC: 26 MMOL/L (ref 21–32)
CREAT BLD-MCNC: 0.61 MG/DL
DEPRECATED RDW RBC AUTO: 56.1 FL (ref 35.1–46.3)
EOSINOPHIL # BLD AUTO: 0.01 X10(3) UL (ref 0–0.7)
EOSINOPHIL NFR BLD AUTO: 0.1 %
ERYTHROCYTE [DISTWIDTH] IN BLOOD BY AUTOMATED COUNT: 19.7 % (ref 11–15)
GFR SERPLBLD BASED ON 1.73 SQ M-ARVRAT: 111 ML/MIN/1.73M2 (ref 60–?)
GLUCOSE BLD-MCNC: 135 MG/DL (ref 70–99)
HCT VFR BLD AUTO: 25 %
HGB BLD-MCNC: 7.7 G/DL
IMM GRANULOCYTES # BLD AUTO: 0.04 X10(3) UL (ref 0–1)
IMM GRANULOCYTES NFR BLD: 0.4 %
LYMPHOCYTES # BLD AUTO: 1.41 X10(3) UL (ref 1–4)
LYMPHOCYTES NFR BLD AUTO: 14.2 %
MAGNESIUM SERPL-MCNC: 1.8 MG/DL (ref 1.6–2.6)
MCH RBC QN AUTO: 24 PG (ref 26–34)
MCHC RBC AUTO-ENTMCNC: 30.8 G/DL (ref 31–37)
MCV RBC AUTO: 77.9 FL
MONOCYTES # BLD AUTO: 0.76 X10(3) UL (ref 0.1–1)
MONOCYTES NFR BLD AUTO: 7.6 %
NEUTROPHILS # BLD AUTO: 7.68 X10 (3) UL (ref 1.5–7.7)
NEUTROPHILS # BLD AUTO: 7.68 X10(3) UL (ref 1.5–7.7)
NEUTROPHILS NFR BLD AUTO: 77.2 %
OSMOLALITY SERPL CALC.SUM OF ELEC: 290 MOSM/KG (ref 275–295)
PHOSPHATE SERPL-MCNC: 3.3 MG/DL (ref 2.5–4.9)
PLATELET # BLD AUTO: 320 10(3)UL (ref 150–450)
POTASSIUM SERPL-SCNC: 3.4 MMOL/L (ref 3.5–5.1)
RBC # BLD AUTO: 3.21 X10(6)UL
SODIUM SERPL-SCNC: 139 MMOL/L (ref 136–145)
WBC # BLD AUTO: 10 X10(3) UL (ref 4–11)

## 2023-07-02 PROCEDURE — 99233 SBSQ HOSP IP/OBS HIGH 50: CPT | Performed by: HOSPITALIST

## 2023-07-02 RX ORDER — HYDROCODONE BITARTRATE AND ACETAMINOPHEN 5; 325 MG/1; MG/1
1 TABLET ORAL EVERY 6 HOURS PRN
Status: DISCONTINUED | OUTPATIENT
Start: 2023-07-02 | End: 2023-07-04

## 2023-07-02 RX ORDER — MAGNESIUM SULFATE HEPTAHYDRATE 40 MG/ML
2 INJECTION, SOLUTION INTRAVENOUS ONCE
Status: COMPLETED | OUTPATIENT
Start: 2023-07-02 | End: 2023-07-02

## 2023-07-02 RX ORDER — HYDROMORPHONE HYDROCHLORIDE 1 MG/ML
0.2 INJECTION, SOLUTION INTRAMUSCULAR; INTRAVENOUS; SUBCUTANEOUS EVERY 2 HOUR PRN
Status: DISCONTINUED | OUTPATIENT
Start: 2023-07-02 | End: 2023-07-04

## 2023-07-02 RX ORDER — POTASSIUM CHLORIDE 20 MEQ/1
40 TABLET, EXTENDED RELEASE ORAL ONCE
Status: COMPLETED | OUTPATIENT
Start: 2023-07-02 | End: 2023-07-02

## 2023-07-02 RX ORDER — HYDROMORPHONE HYDROCHLORIDE 1 MG/ML
0.8 INJECTION, SOLUTION INTRAMUSCULAR; INTRAVENOUS; SUBCUTANEOUS EVERY 2 HOUR PRN
Status: DISCONTINUED | OUTPATIENT
Start: 2023-07-02 | End: 2023-07-04

## 2023-07-02 RX ORDER — HYDROMORPHONE HYDROCHLORIDE 1 MG/ML
0.4 INJECTION, SOLUTION INTRAMUSCULAR; INTRAVENOUS; SUBCUTANEOUS EVERY 2 HOUR PRN
Status: DISCONTINUED | OUTPATIENT
Start: 2023-07-02 | End: 2023-07-04

## 2023-07-02 NOTE — PLAN OF CARE
IV infiltrated on admission to floor. IV discontinued. Vitals within normal limits. Room Air. Ambulating with standby assist. Denies pain. Denies nausea or vomiting. Tolerating soft diet. Declined pain meds. Midline incision is open to air, staples. Call light within reach. Will continue to monitor.        Problem: Patient Centered Care  Goal: Patient preferences are identified and integrated in the patient's plan of care  Description: Interventions:  - Provide timely, complete, and accurate information to patient/family  - Incorporate patient and family knowledge, values, beliefs, and cultural backgrounds into the planning and delivery of care  - Encourage patient/family to participate in care and decision-making at the level they choose  - Honor patient and family perspectives and choices  Outcome: Progressing    Problem: GASTROINTESTINAL - ADULT  Goal: Minimal or absence of nausea and vomiting  Description: INTERVENTIONS:  - Maintain adequate hydration with IV or PO as ordered and tolerated  - Nasogastric tube to low intermittent suction as ordered  - Evaluate effectiveness of ordered antiemetic medications  - Provide nonpharmacologic comfort measures as appropriate  - Advance diet as tolerated, if ordered  - Obtain nutritional consult as needed  - Evaluate fluid balance  Outcome: Progressing  Goal: Maintains or returns to baseline bowel function  Description: INTERVENTIONS:  - Assess bowel function  - Maintain adequate hydration with IV or PO as ordered and tolerated  - Evaluate effectiveness of GI medications  - Encourage mobilization and activity  - Obtain nutritional consult as needed  - Establish a toileting routine/schedule  - Consider collaborating with pharmacy to review patient's medication profile  Outcome: Progressing  Goal: Maintains adequate nutritional intake (undernourished)  Description: INTERVENTIONS:  - Monitor percentage of each meal consumed  - Identify factors contributing to decreased intake, treat as appropriate  - Assist with meals as needed  - Monitor I&O, WT and lab values  - Obtain nutritional consult as needed  - Optimize oral hygiene and moisture  - Encourage food from home; allow for food preferences  - Enhance eating environment  Outcome: Progressing  Goal: Achieves appropriate nutritional intake (bariatric)  Description: INTERVENTIONS:  - Monitor for over-consumption  - Identify factors contributing to increased intake, treat as appropriate  - Monitor I&O, WT and lab values  - Obtain nutritional consult as needed  - Evaluate psychosocial factors contributing to over-consumption  Outcome: Progressing     Problem: SAFETY ADULT - FALL  Goal: Free from fall injury  Description: INTERVENTIONS:  - Assess pt frequently for physical needs  - Identify cognitive and physical deficits and behaviors that affect risk of falls. - Coventry fall precautions as indicated by assessment.  - Educate pt/family on patient safety including physical limitations  - Instruct pt to call for assistance with activity based on assessment  - Modify environment to reduce risk of injury  - Provide assistive devices as appropriate  - Consider OT/PT consult to assist with strengthening/mobility  - Encourage toileting schedule  Outcome: Progressing     Problem: CARDIOVASCULAR - ADULT  Goal: Maintains optimal cardiac output and hemodynamic stability  Description: INTERVENTIONS:  - Monitor vital signs, rhythm, and trends  - Monitor for bleeding, hypotension and signs of decreased cardiac output  - Evaluate effectiveness of vasoactive medications to optimize hemodynamic stability  - Monitor arterial and/or venous puncture sites for bleeding and/or hematoma  - Assess quality of pulses, skin color and temperature  - Assess for signs of decreased coronary artery perfusion - ex.  Angina  - Evaluate fluid balance, assess for edema, trend weights  Outcome: Progressing     Problem: RESPIRATORY - ADULT  Goal: Achieves optimal ventilation and oxygenation  Description: INTERVENTIONS:  - Assess for changes in respiratory status  - Assess for changes in mentation and behavior  - Position to facilitate oxygenation and minimize respiratory effort  - Oxygen supplementation based on oxygen saturation or ABGs  - Provide Smoking Cessation handout, if applicable  - Encourage broncho-pulmonary hygiene including cough, deep breathe, Incentive Spirometry  - Assess the need for suctioning and perform as needed  - Assess and instruct to report SOB or any respiratory difficulty  - Respiratory Therapy support as indicated  - Manage/alleviate anxiety  - Monitor for signs/symptoms of CO2 retention  Outcome: Progressing     Problem: SKIN/TISSUE INTEGRITY - ADULT  Goal: Skin integrity remains intact  Description: INTERVENTIONS  - Assess and document risk factors for pressure ulcer development  - Assess and document skin integrity  - Monitor for areas of redness and/or skin breakdown  - Initiate interventions, skin care algorithm/standards of care as needed  Outcome: Progressing     Problem: Diabetes/Glucose Control  Goal: Glucose maintained within prescribed range  Description: INTERVENTIONS:  - Monitor Blood Glucose as ordered  - Assess for signs and symptoms of hyperglycemia and hypoglycemia  - Administer ordered medications to maintain glucose within target range  - Assess barriers to adequate nutritional intake and initiate nutrition consult as needed  - Instruct patient on self management of diabetes  Outcome: Progressing     Problem: PAIN - ADULT  Goal: Verbalizes/displays adequate comfort level or patient's stated pain goal  Description: INTERVENTIONS:  - Encourage pt to monitor pain and request assistance  - Assess pain using appropriate pain scale  - Administer analgesics based on type and severity of pain and evaluate response  - Implement non-pharmacological measures as appropriate and evaluate response  - Consider cultural and social influences on pain and pain management  - Manage/alleviate anxiety  - Utilize distraction and/or relaxation techniques  - Monitor for opioid side effects  - Notify MD/LIP if interventions unsuccessful or patient reports new pain  - Anticipate increased pain with activity and pre-medicate as appropriate  Outcome: Progressing

## 2023-07-02 NOTE — PROGRESS NOTES
Patient complained about abdominal distention and bloating at 1800 (  used). Ambulated in a room, used IS. Dr. Gilbert Hernandez notified.  Orders to change diet to clear liquids and D?c PCA pimp

## 2023-07-02 NOTE — PLAN OF CARE
Problem: Patient Centered Care  Goal: Patient preferences are identified and integrated in the patient's plan of care  Description: Interventions:  - What would you like us to know as we care for you?   - Provide timely, complete, and accurate information to patient/family  - Incorporate patient and family knowledge, values, beliefs, and cultural backgrounds into the planning and delivery of care  - Encourage patient/family to participate in care and decision-making at the level they choose  - Honor patient and family perspectives and choices  Outcome: Progressing     Problem: Patient/Family Goals  Goal: Patient/Family Long Term Goal  Description: Patient's Long Term Goal:     Interventions:  -   - See additional Care Plan goals for specific interventions  Outcome: Progressing  Goal: Patient/Family Short Term Goal  Description: Patient's Short Term Goal:     Interventions:   - - See additional Care Plan goals for specific interventions  Outcome: Progressing     Problem: GASTROINTESTINAL - ADULT  Goal: Minimal or absence of nausea and vomiting  Description: INTERVENTIONS:  - Maintain adequate hydration with IV or PO as ordered and tolerated  - Nasogastric tube to low intermittent suction as ordered  - Evaluate effectiveness of ordered antiemetic medications  - Provide nonpharmacologic comfort measures as appropriate  - Advance diet as tolerated, if ordered  - Obtain nutritional consult as needed  - Evaluate fluid balance  Outcome: Progressing  Goal: Maintains or returns to baseline bowel function  Description: INTERVENTIONS:  - Assess bowel function  - Maintain adequate hydration with IV or PO as ordered and tolerated  - Evaluate effectiveness of GI medications  - Encourage mobilization and activity  - Obtain nutritional consult as needed  - Establish a toileting routine/schedule  - Consider collaborating with pharmacy to review patient's medication profile  Outcome: Progressing  Goal: Maintains adequate nutritional intake (undernourished)  Description: INTERVENTIONS:  - Monitor percentage of each meal consumed  - Identify factors contributing to decreased intake, treat as appropriate  - Assist with meals as needed  - Monitor I&O, WT and lab values  - Obtain nutritional consult as needed  - Optimize oral hygiene and moisture  - Encourage food from home; allow for food preferences  - Enhance eating environment  Outcome: Progressing  Goal: Achieves appropriate nutritional intake (bariatric)  Description: INTERVENTIONS:  - Monitor for over-consumption  - Identify factors contributing to increased intake, treat as appropriate  - Monitor I&O, WT and lab values  - Obtain nutritional consult as needed  - Evaluate psychosocial factors contributing to over-consumption  Outcome: Progressing     Problem: CARDIOVASCULAR - ADULT  Goal: Maintains optimal cardiac output and hemodynamic stability  Description: INTERVENTIONS:  - Monitor vital signs, rhythm, and trends  - Monitor for bleeding, hypotension and signs of decreased cardiac output  - Evaluate effectiveness of vasoactive medications to optimize hemodynamic stability  - Monitor arterial and/or venous puncture sites for bleeding and/or hematoma  - Assess quality of pulses, skin color and temperature  - Assess for signs of decreased coronary artery perfusion - ex.  Angina  - Evaluate fluid balance, assess for edema, trend weights  Outcome: Progressing     Problem: RESPIRATORY - ADULT  Goal: Achieves optimal ventilation and oxygenation  Description: INTERVENTIONS:  - Assess for changes in respiratory status  - Assess for changes in mentation and behavior  - Position to facilitate oxygenation and minimize respiratory effort  - Oxygen supplementation based on oxygen saturation or ABGs  - Provide Smoking Cessation handout, if applicable  - Encourage broncho-pulmonary hygiene including cough, deep breathe, Incentive Spirometry  - Assess the need for suctioning and perform as needed  - Assess and instruct to report SOB or any respiratory difficulty  - Respiratory Therapy support as indicated  - Manage/alleviate anxiety  - Monitor for signs/symptoms of CO2 retention  Outcome: Progressing     Problem: SKIN/TISSUE INTEGRITY - ADULT  Goal: Skin integrity remains intact  Description: INTERVENTIONS  - Assess and document risk factors for pressure ulcer development  - Assess and document skin integrity  - Monitor for areas of redness and/or skin breakdown  - Initiate interventions, skin care algorithm/standards of care as needed  Outcome: Progressing     Problem: SAFETY ADULT - FALL  Goal: Free from fall injury  Description: INTERVENTIONS:  - Assess pt frequently for physical needs  - Identify cognitive and physical deficits and behaviors that affect risk of falls. - [place] fall precautions as indicated by assessment.  - Educate pt/family on patient safety including physical limitations  - Instruct pt to call for assistance with activity based on assessment  - Modify environment to reduce risk of injury  - Provide assistive devices as appropriate  - Consider OT/PT consult to assist with strengthening/mobility  - Encourage toileting schedule  Outcome: Progressing   Patient with minimal pain, diet advanced to low fiber. Patient abdomen more distended at night. Changed diet to clear per Dr. Debra Denny. PCA discontinued. Patient sat in a chair and ambulated in the room. Oncology consulted.

## 2023-07-02 NOTE — PLAN OF CARE
Double RN skin check done prior to transfer off Unit. Skin check performed by this RN and raphael. Wounds are as follows: midline incision with staples intact. No redness noted. Noted patient may ambulate with walker with assist. Up to chair with walker. If up to Bathroom please have someone stay with him as his hgb was 7.7. No c/o dizziness, nausea or pain. Pca was dcd at 56, Did not use any PCA medication throughout the day shift. Taking clears without issues. Iv sites are patent. Family is with patient upon transfer. Please use the language line . Will remain available for any further questions or concerns.

## 2023-07-02 NOTE — PLAN OF CARE
Assumed care of pt around 2300 last night. Received pt AOx4 on RA. Pt's oxygen saturation sustaining 87-88% overnight, 4L NC applied. Vital signs otherwise stable. Pt reporting 8-9/10 pain in abdomen right around incision site. PCA pump restarted,  utilized for PCA pump education. Pt verbalized understanding. During physical reassessment, pt rating pain in abdomen 3/10. Bed locked in lowest position. Safety maintained. Problem: CARDIOVASCULAR - ADULT  Goal: Maintains optimal cardiac output and hemodynamic stability  Description: INTERVENTIONS:  - Monitor vital signs, rhythm, and trends  - Monitor for bleeding, hypotension and signs of decreased cardiac output  - Evaluate effectiveness of vasoactive medications to optimize hemodynamic stability  - Monitor arterial and/or venous puncture sites for bleeding and/or hematoma  - Assess quality of pulses, skin color and temperature  - Assess for signs of decreased coronary artery perfusion - ex.  Angina  - Evaluate fluid balance, assess for edema, trend weights  Outcome: Progressing     Problem: PAIN - ADULT  Goal: Verbalizes/displays adequate comfort level or patient's stated pain goal  Description: INTERVENTIONS:  - Encourage pt to monitor pain and request assistance  - Assess pain using appropriate pain scale  - Administer analgesics based on type and severity of pain and evaluate response  - Implement non-pharmacological measures as appropriate and evaluate response  - Consider cultural and social influences on pain and pain management  - Manage/alleviate anxiety  - Utilize distraction and/or relaxation techniques  - Monitor for opioid side effects  - Notify MD/LIP if interventions unsuccessful or patient reports new pain  - Anticipate increased pain with activity and pre-medicate as appropriate  Outcome: Not Progressing

## 2023-07-02 NOTE — PLAN OF CARE
Problem: Patient Centered Care  Goal: Patient preferences are identified and integrated in the patient's plan of care  Description: Interventions:  - What would you like us to know as we care for you?  English speaking patient    - Provide timely, complete, and accurate information to patient/family  - Incorporate patient and family knowledge, values, beliefs, and cultural backgrounds into the planning and delivery of care  - Encourage patient/family to participate in care and decision-making at the level they choose  - Honor patient and family perspectives and choices  Outcome: Progressing     Problem: Patient/Family Goals  Goal: Patient/Family Long Term Goal  Description: Patient's Long Term Goal: no abdominal pain    Interventions:  - assess pain  - administer pain medication  Reassess pain 1 hour post intervention   - See additional Care Plan goals for specific interventions  Outcome: Progressing  Goal: Patient/Family Short Term Goal  Description: Patient's Short Term Goal: 02 stats above 90 %    Interventions:   - use IS q 1 hour  Cough and deep breathe q 1 hour   Use IS q 1 hour   - See additional Care Plan goals for specific interventions  Outcome: Progressing     Problem: GASTROINTESTINAL - ADULT  Goal: Minimal or absence of nausea and vomiting  Description: INTERVENTIONS:  - Maintain adequate hydration with IV or PO as ordered and tolerated  - Nasogastric tube to low intermittent suction as ordered  - Evaluate effectiveness of ordered antiemetic medications  - Provide nonpharmacologic comfort measures as appropriate  - Advance diet as tolerated, if ordered  - Obtain nutritional consult as needed  - Evaluate fluid balance  Outcome: Progressing  Goal: Maintains or returns to baseline bowel function  Description: INTERVENTIONS:  - Assess bowel function  - Maintain adequate hydration with IV or PO as ordered and tolerated  - Evaluate effectiveness of GI medications  - Encourage mobilization and activity  - Obtain nutritional consult as needed  - Establish a toileting routine/schedule  - Consider collaborating with pharmacy to review patient's medication profile  Outcome: Progressing  Goal: Maintains adequate nutritional intake (undernourished)  Description: INTERVENTIONS:  - Monitor percentage of each meal consumed  - Identify factors contributing to decreased intake, treat as appropriate  - Assist with meals as needed  - Monitor I&O, WT and lab values  - Obtain nutritional consult as needed  - Optimize oral hygiene and moisture  - Encourage food from home; allow for food preferences  - Enhance eating environment  Outcome: Progressing  Goal: Achieves appropriate nutritional intake (bariatric)  Description: INTERVENTIONS:  - Monitor for over-consumption  - Identify factors contributing to increased intake, treat as appropriate  - Monitor I&O, WT and lab values  - Obtain nutritional consult as needed  - Evaluate psychosocial factors contributing to over-consumption  Outcome: Progressing     Problem: GASTROINTESTINAL - ADULT  Goal: Minimal or absence of nausea and vomiting  Description: INTERVENTIONS:  - Maintain adequate hydration with IV or PO as ordered and tolerated  - Nasogastric tube to low intermittent suction as ordered  - Evaluate effectiveness of ordered antiemetic medications  - Provide nonpharmacologic comfort measures as appropriate  - Advance diet as tolerated, if ordered  - Obtain nutritional consult as needed  - Evaluate fluid balance  Outcome: Progressing  Goal: Maintains or returns to baseline bowel function  Description: INTERVENTIONS:  - Assess bowel function  - Maintain adequate hydration with IV or PO as ordered and tolerated  - Evaluate effectiveness of GI medications  - Encourage mobilization and activity  - Obtain nutritional consult as needed  - Establish a toileting routine/schedule  - Consider collaborating with pharmacy to review patient's medication profile  Outcome: Progressing  Goal: Maintains adequate nutritional intake (undernourished)  Description: INTERVENTIONS:  - Monitor percentage of each meal consumed  - Identify factors contributing to decreased intake, treat as appropriate  - Assist with meals as needed  - Monitor I&O, WT and lab values  - Obtain nutritional consult as needed  - Optimize oral hygiene and moisture  - Encourage food from home; allow for food preferences  - Enhance eating environment  Outcome: Progressing  Goal: Achieves appropriate nutritional intake (bariatric)  Description: INTERVENTIONS:  - Monitor for over-consumption  - Identify factors contributing to increased intake, treat as appropriate  - Monitor I&O, WT and lab values  - Obtain nutritional consult as needed  - Evaluate psychosocial factors contributing to over-consumption  Outcome: Progressing     Problem: CARDIOVASCULAR - ADULT  Goal: Maintains optimal cardiac output and hemodynamic stability  Description: INTERVENTIONS:  - Monitor vital signs, rhythm, and trends  - Monitor for bleeding, hypotension and signs of decreased cardiac output  - Evaluate effectiveness of vasoactive medications to optimize hemodynamic stability  - Monitor arterial and/or venous puncture sites for bleeding and/or hematoma  - Assess quality of pulses, skin color and temperature  - Assess for signs of decreased coronary artery perfusion - ex.  Angina  - Evaluate fluid balance, assess for edema, trend weights  Outcome: Progressing     Problem: RESPIRATORY - ADULT  Goal: Achieves optimal ventilation and oxygenation  Description: INTERVENTIONS:  - Assess for changes in respiratory status  - Assess for changes in mentation and behavior  - Position to facilitate oxygenation and minimize respiratory effort  - Oxygen supplementation based on oxygen saturation or ABGs  - Provide Smoking Cessation handout, if applicable  - Encourage broncho-pulmonary hygiene including cough, deep breathe, Incentive Spirometry  - Assess the need for suctioning and perform as needed  - Assess and instruct to report SOB or any respiratory difficulty  - Respiratory Therapy support as indicated  - Manage/alleviate anxiety  - Monitor for signs/symptoms of CO2 retention  Outcome: Progressing     Problem: SKIN/TISSUE INTEGRITY - ADULT  Goal: Skin integrity remains intact  Description: INTERVENTIONS  - Assess and document risk factors for pressure ulcer development  - Assess and document skin integrity  - Monitor for areas of redness and/or skin breakdown  - Initiate interventions, skin care algorithm/standards of care as needed  Outcome: Progressing     Problem: SAFETY ADULT - FALL  Goal: Free from fall injury  Description: INTERVENTIONS:  - Assess pt frequently for physical needs  - Identify cognitive and physical deficits and behaviors that affect risk of falls.   - [place] fall precautions as indicated by assessment.  - Educate pt/family on patient safety including physical limitations  - Instruct pt to call for assistance with activity based on assessment  - Modify environment to reduce risk of injury  - Provide assistive devices as appropriate  - Consider OT/PT consult to assist with strengthening/mobility  - Encourage toileting schedule  Outcome: Progressing     Problem: Diabetes/Glucose Control  Goal: Glucose maintained within prescribed range  Description: INTERVENTIONS:  - Monitor Blood Glucose as ordered  - Assess for signs and symptoms of hyperglycemia and hypoglycemia  - Administer ordered medications to maintain glucose within target range  - Assess barriers to adequate nutritional intake and initiate nutrition consult as needed  - Instruct patient on self management of diabetes  Outcome: Progressing     Problem: PAIN - ADULT  Goal: Verbalizes/displays adequate comfort level or patient's stated pain goal  Description: INTERVENTIONS:  - Encourage pt to monitor pain and request assistance  - Assess pain using appropriate pain scale  - Administer analgesics based on type and severity of pain and evaluate response  - Implement non-pharmacological measures as appropriate and evaluate response  - Consider cultural and social influences on pain and pain management  - Manage/alleviate anxiety  - Utilize distraction and/or relaxation techniques  - Monitor for opioid side effects  - Notify MD/LIP if interventions unsuccessful or patient reports new pain  - Anticipate increased pain with activity and pre-medicate as appropriate  Outcome: Progressing

## 2023-07-03 LAB
BASOPHILS # BLD AUTO: 0.06 X10(3) UL (ref 0–0.2)
BASOPHILS NFR BLD AUTO: 0.7 %
BLOOD TYPE BARCODE: 6200
DEPRECATED RDW RBC AUTO: 56.7 FL (ref 35.1–46.3)
EOSINOPHIL # BLD AUTO: 0.06 X10(3) UL (ref 0–0.7)
EOSINOPHIL NFR BLD AUTO: 0.7 %
ERYTHROCYTE [DISTWIDTH] IN BLOOD BY AUTOMATED COUNT: 20 % (ref 11–15)
HCT VFR BLD AUTO: 25.5 %
HGB BLD-MCNC: 7.8 G/DL
IMM GRANULOCYTES # BLD AUTO: 0.03 X10(3) UL (ref 0–1)
IMM GRANULOCYTES NFR BLD: 0.3 %
LYMPHOCYTES # BLD AUTO: 1.51 X10(3) UL (ref 1–4)
LYMPHOCYTES NFR BLD AUTO: 17.1 %
MCH RBC QN AUTO: 23.9 PG (ref 26–34)
MCHC RBC AUTO-ENTMCNC: 30.6 G/DL (ref 31–37)
MCV RBC AUTO: 78 FL
MONOCYTES # BLD AUTO: 0.69 X10(3) UL (ref 0.1–1)
MONOCYTES NFR BLD AUTO: 7.8 %
NEUTROPHILS # BLD AUTO: 6.48 X10 (3) UL (ref 1.5–7.7)
NEUTROPHILS # BLD AUTO: 6.48 X10(3) UL (ref 1.5–7.7)
NEUTROPHILS NFR BLD AUTO: 73.4 %
PLATELET # BLD AUTO: 319 10(3)UL (ref 150–450)
RBC # BLD AUTO: 3.27 X10(6)UL
UNIT VOLUME: 350 ML
WBC # BLD AUTO: 8.8 X10(3) UL (ref 4–11)

## 2023-07-03 PROCEDURE — 99233 SBSQ HOSP IP/OBS HIGH 50: CPT | Performed by: HOSPITALIST

## 2023-07-03 RX ORDER — POLYETHYLENE GLYCOL 3350 17 G/17G
17 POWDER, FOR SOLUTION ORAL DAILY
Status: DISCONTINUED | OUTPATIENT
Start: 2023-07-03 | End: 2023-07-04

## 2023-07-03 RX ORDER — BISACODYL 10 MG
10 SUPPOSITORY, RECTAL RECTAL
Status: DISCONTINUED | OUTPATIENT
Start: 2023-07-03 | End: 2023-07-04

## 2023-07-03 RX ORDER — ENEMA 19; 7 G/133ML; G/133ML
1 ENEMA RECTAL ONCE AS NEEDED
Status: DISCONTINUED | OUTPATIENT
Start: 2023-07-03 | End: 2023-07-04

## 2023-07-03 RX ORDER — DOCUSATE SODIUM 100 MG/1
100 CAPSULE, LIQUID FILLED ORAL 2 TIMES DAILY
Status: DISCONTINUED | OUTPATIENT
Start: 2023-07-03 | End: 2023-07-04

## 2023-07-03 NOTE — PLAN OF CARE
Problem: Patient Centered Care  Goal: Patient preferences are identified and integrated in the patient's plan of care  Description: Interventions:  - What would you like us to know as we care for you?   - Provide timely, complete, and accurate information to patient/family  - Incorporate patient and family knowledge, values, beliefs, and cultural backgrounds into the planning and delivery of care  - Encourage patient/family to participate in care and decision-making at the level they choose  - Honor patient and family perspectives and choices  Outcome: Progressing     Problem: Patient/Family Goals  Goal: Patient/Family Long Term Goal  Description: Patient's Long Term Goal:     Interventions:  - See additional Care Plan goals for specific interventions  Outcome: Progressing  Goal: Patient/Family Short Term Goal  Description: Patient's Short Term Goal:     Interventions:   - See additional Care Plan goals for specific interventions  Outcome: Progressing     Problem: GASTROINTESTINAL - ADULT  Goal: Minimal or absence of nausea and vomiting  Description: INTERVENTIONS:  - Maintain adequate hydration with IV or PO as ordered and tolerated  - Nasogastric tube to low intermittent suction as ordered  - Evaluate effectiveness of ordered antiemetic medications  - Provide nonpharmacologic comfort measures as appropriate  - Advance diet as tolerated, if ordered  - Obtain nutritional consult as needed  - Evaluate fluid balance  Outcome: Progressing  Goal: Maintains or returns to baseline bowel function  Description: INTERVENTIONS:  - Assess bowel function  - Maintain adequate hydration with IV or PO as ordered and tolerated  - Evaluate effectiveness of GI medications  - Encourage mobilization and activity  - Obtain nutritional consult as needed  - Establish a toileting routine/schedule  - Consider collaborating with pharmacy to review patient's medication profile  Outcome: Progressing  Goal: Maintains adequate nutritional intake (undernourished)  Description: INTERVENTIONS:  - Monitor percentage of each meal consumed  - Identify factors contributing to decreased intake, treat as appropriate  - Assist with meals as needed  - Monitor I&O, WT and lab values  - Obtain nutritional consult as needed  - Optimize oral hygiene and moisture  - Encourage food from home; allow for food preferences  - Enhance eating environment  Outcome: Progressing  Goal: Achieves appropriate nutritional intake (bariatric)  Description: INTERVENTIONS:  - Monitor for over-consumption  - Identify factors contributing to increased intake, treat as appropriate  - Monitor I&O, WT and lab values  - Obtain nutritional consult as needed  - Evaluate psychosocial factors contributing to over-consumption  Outcome: Progressing     Problem: SAFETY ADULT - FALL  Goal: Free from fall injury  Description: INTERVENTIONS:  - Assess pt frequently for physical needs  - Identify cognitive and physical deficits and behaviors that affect risk of falls. - [place] fall precautions as indicated by assessment.  - Educate pt/family on patient safety including physical limitations  - Instruct pt to call for assistance with activity based on assessment  - Modify environment to reduce risk of injury  - Provide assistive devices as appropriate  - Consider OT/PT consult to assist with strengthening/mobility  - Encourage toileting schedule  Outcome: Progressing     Problem: CARDIOVASCULAR - ADULT  Goal: Maintains optimal cardiac output and hemodynamic stability  Description: INTERVENTIONS:  - Monitor vital signs, rhythm, and trends  - Monitor for bleeding, hypotension and signs of decreased cardiac output  - Evaluate effectiveness of vasoactive medications to optimize hemodynamic stability  - Monitor arterial and/or venous puncture sites for bleeding and/or hematoma  - Assess quality of pulses, skin color and temperature  - Assess for signs of decreased coronary artery perfusion - ex. Angina  - Evaluate fluid balance, assess for edema, trend weights  Outcome: Progressing     Problem: RESPIRATORY - ADULT  Goal: Achieves optimal ventilation and oxygenation  Description: INTERVENTIONS:  - Assess for changes in respiratory status  - Assess for changes in mentation and behavior  - Position to facilitate oxygenation and minimize respiratory effort  - Oxygen supplementation based on oxygen saturation or ABGs  - Provide Smoking Cessation handout, if applicable  - Encourage broncho-pulmonary hygiene including cough, deep breathe, Incentive Spirometry  - Assess the need for suctioning and perform as needed  - Assess and instruct to report SOB or any respiratory difficulty  - Respiratory Therapy support as indicated  - Manage/alleviate anxiety  - Monitor for signs/symptoms of CO2 retention  Outcome: Progressing     Problem: SKIN/TISSUE INTEGRITY - ADULT  Goal: Skin integrity remains intact  Description: INTERVENTIONS  - Assess and document risk factors for pressure ulcer development  - Assess and document skin integrity  - Monitor for areas of redness and/or skin breakdown  - Initiate interventions, skin care algorithm/standards of care as needed  Outcome: Progressing     Problem: Diabetes/Glucose Control  Goal: Glucose maintained within prescribed range  Description: INTERVENTIONS:  - Monitor Blood Glucose as ordered  - Assess for signs and symptoms of hyperglycemia and hypoglycemia  - Administer ordered medications to maintain glucose within target range  - Assess barriers to adequate nutritional intake and initiate nutrition consult as needed  - Instruct patient on self management of diabetes  Outcome: Progressing     Problem: PAIN - ADULT  Goal: Verbalizes/displays adequate comfort level or patient's stated pain goal  Description: INTERVENTIONS:  - Encourage pt to monitor pain and request assistance  - Assess pain using appropriate pain scale  - Administer analgesics based on type and severity of pain and evaluate response  - Implement non-pharmacological measures as appropriate and evaluate response  - Consider cultural and social influences on pain and pain management  - Manage/alleviate anxiety  - Utilize distraction and/or relaxation techniques  - Monitor for opioid side effects  - Notify MD/LIP if interventions unsuccessful or patient reports new pain  - Anticipate increased pain with activity and pre-medicate as appropriate  Outcome: Progressing     No acute changes this shift. VSS on RA. Tolerating soft diet, denies nausea, vomiting. Denies pain, declined pain medications. Midline incision with staples, RASHAWN. Heparin subcutaneous and SCDs for DVT prophylaxis. Voiding freely. Ambulating with standby assist. Fall precautions in place: nonskid socks on, bed low. Call light within reach, pt calls appropriately.

## 2023-07-03 NOTE — PLAN OF CARE
Patient alert and oriented x4. Vss. On room air. Tolerating diet, no nausea. +flatus, + belch, -BM. Miralax and colace given. Patient states his stomach feels unwell at times. Denies pain. Ambulating independently. Fall precautions in place. Call light within reach. Family at bedside. Plan for home tomorrow.    Problem: Patient Centered Care  Goal: Patient preferences are identified and integrated in the patient's plan of care  Description: Interventions:  - What would you like us to know as we care for you?   - Provide timely, complete, and accurate information to patient/family  - Incorporate patient and family knowledge, values, beliefs, and cultural backgrounds into the planning and delivery of care  - Encourage patient/family to participate in care and decision-making at the level they choose  - Honor patient and family perspectives and choices  Outcome: Progressing     Problem: Patient/Family Goals  Goal: Patient/Family Long Term Goal  Description: Patient's Long Term Goal:     Interventions:  -   - See additional Care Plan goals for specific interventions  Outcome: Progressing  Goal: Patient/Family Short Term Goal  Description: Patient's Short Term Goal:     Interventions:   -   - See additional Care Plan goals for specific interventions  Outcome: Progressing     Problem: GASTROINTESTINAL - ADULT  Goal: Minimal or absence of nausea and vomiting  Description: INTERVENTIONS:  - Maintain adequate hydration with IV or PO as ordered and tolerated  - Nasogastric tube to low intermittent suction as ordered  - Evaluate effectiveness of ordered antiemetic medications  - Provide nonpharmacologic comfort measures as appropriate  - Advance diet as tolerated, if ordered  - Obtain nutritional consult as needed  - Evaluate fluid balance  Outcome: Progressing  Goal: Maintains or returns to baseline bowel function  Description: INTERVENTIONS:  - Assess bowel function  - Maintain adequate hydration with IV or PO as ordered and tolerated  - Evaluate effectiveness of GI medications  - Encourage mobilization and activity  - Obtain nutritional consult as needed  - Establish a toileting routine/schedule  - Consider collaborating with pharmacy to review patient's medication profile  Outcome: Progressing  Goal: Maintains adequate nutritional intake (undernourished)  Description: INTERVENTIONS:  - Monitor percentage of each meal consumed  - Identify factors contributing to decreased intake, treat as appropriate  - Assist with meals as needed  - Monitor I&O, WT and lab values  - Obtain nutritional consult as needed  - Optimize oral hygiene and moisture  - Encourage food from home; allow for food preferences  - Enhance eating environment  Outcome: Progressing  Goal: Achieves appropriate nutritional intake (bariatric)  Description: INTERVENTIONS:  - Monitor for over-consumption  - Identify factors contributing to increased intake, treat as appropriate  - Monitor I&O, WT and lab values  - Obtain nutritional consult as needed  - Evaluate psychosocial factors contributing to over-consumption  Outcome: Progressing     Problem: SAFETY ADULT - FALL  Goal: Free from fall injury  Description: INTERVENTIONS:  - Assess pt frequently for physical needs  - Identify cognitive and physical deficits and behaviors that affect risk of falls.   - [place] fall precautions as indicated by assessment.  - Educate pt/family on patient safety including physical limitations  - Instruct pt to call for assistance with activity based on assessment  - Modify environment to reduce risk of injury  - Provide assistive devices as appropriate  - Consider OT/PT consult to assist with strengthening/mobility  - Encourage toileting schedule  Outcome: Progressing     Problem: CARDIOVASCULAR - ADULT  Goal: Maintains optimal cardiac output and hemodynamic stability  Description: INTERVENTIONS:  - Monitor vital signs, rhythm, and trends  - Monitor for bleeding, hypotension and signs of decreased cardiac output  - Evaluate effectiveness of vasoactive medications to optimize hemodynamic stability  - Monitor arterial and/or venous puncture sites for bleeding and/or hematoma  - Assess quality of pulses, skin color and temperature  - Assess for signs of decreased coronary artery perfusion - ex.  Angina  - Evaluate fluid balance, assess for edema, trend weights  Outcome: Progressing     Problem: RESPIRATORY - ADULT  Goal: Achieves optimal ventilation and oxygenation  Description: INTERVENTIONS:  - Assess for changes in respiratory status  - Assess for changes in mentation and behavior  - Position to facilitate oxygenation and minimize respiratory effort  - Oxygen supplementation based on oxygen saturation or ABGs  - Provide Smoking Cessation handout, if applicable  - Encourage broncho-pulmonary hygiene including cough, deep breathe, Incentive Spirometry  - Assess the need for suctioning and perform as needed  - Assess and instruct to report SOB or any respiratory difficulty  - Respiratory Therapy support as indicated  - Manage/alleviate anxiety  - Monitor for signs/symptoms of CO2 retention  Outcome: Progressing     Problem: SKIN/TISSUE INTEGRITY - ADULT  Goal: Skin integrity remains intact  Description: INTERVENTIONS  - Assess and document risk factors for pressure ulcer development  - Assess and document skin integrity  - Monitor for areas of redness and/or skin breakdown  - Initiate interventions, skin care algorithm/standards of care as needed  Outcome: Progressing     Problem: Diabetes/Glucose Control  Goal: Glucose maintained within prescribed range  Description: INTERVENTIONS:  - Monitor Blood Glucose as ordered  - Assess for signs and symptoms of hyperglycemia and hypoglycemia  - Administer ordered medications to maintain glucose within target range  - Assess barriers to adequate nutritional intake and initiate nutrition consult as needed  - Instruct patient on self management of diabetes  Outcome: Progressing     Problem: PAIN - ADULT  Goal: Verbalizes/displays adequate comfort level or patient's stated pain goal  Description: INTERVENTIONS:  - Encourage pt to monitor pain and request assistance  - Assess pain using appropriate pain scale  - Administer analgesics based on type and severity of pain and evaluate response  - Implement non-pharmacological measures as appropriate and evaluate response  - Consider cultural and social influences on pain and pain management  - Manage/alleviate anxiety  - Utilize distraction and/or relaxation techniques  - Monitor for opioid side effects  - Notify MD/LIP if interventions unsuccessful or patient reports new pain  - Anticipate increased pain with activity and pre-medicate as appropriate  Outcome: Progressing

## 2023-07-04 VITALS
WEIGHT: 214.31 LBS | SYSTOLIC BLOOD PRESSURE: 127 MMHG | HEART RATE: 86 BPM | BODY MASS INDEX: 32.48 KG/M2 | OXYGEN SATURATION: 99 % | RESPIRATION RATE: 18 BRPM | DIASTOLIC BLOOD PRESSURE: 78 MMHG | TEMPERATURE: 98 F | HEIGHT: 68 IN

## 2023-07-04 LAB
BASOPHILS # BLD AUTO: 0.08 X10(3) UL (ref 0–0.2)
BASOPHILS NFR BLD AUTO: 1 %
DEPRECATED RDW RBC AUTO: 57.9 FL (ref 35.1–46.3)
EOSINOPHIL # BLD AUTO: 0.1 X10(3) UL (ref 0–0.7)
EOSINOPHIL NFR BLD AUTO: 1.2 %
ERYTHROCYTE [DISTWIDTH] IN BLOOD BY AUTOMATED COUNT: 20.1 % (ref 11–15)
HCT VFR BLD AUTO: 26.1 %
HGB BLD-MCNC: 7.8 G/DL
IMM GRANULOCYTES # BLD AUTO: 0.02 X10(3) UL (ref 0–1)
IMM GRANULOCYTES NFR BLD: 0.2 %
LYMPHOCYTES # BLD AUTO: 1.42 X10(3) UL (ref 1–4)
LYMPHOCYTES NFR BLD AUTO: 17.5 %
MCH RBC QN AUTO: 23.4 PG (ref 26–34)
MCHC RBC AUTO-ENTMCNC: 29.9 G/DL (ref 31–37)
MCV RBC AUTO: 78.1 FL
MONOCYTES # BLD AUTO: 0.6 X10(3) UL (ref 0.1–1)
MONOCYTES NFR BLD AUTO: 7.4 %
NEUTROPHILS # BLD AUTO: 5.89 X10 (3) UL (ref 1.5–7.7)
NEUTROPHILS # BLD AUTO: 5.89 X10(3) UL (ref 1.5–7.7)
NEUTROPHILS NFR BLD AUTO: 72.7 %
PLATELET # BLD AUTO: 320 10(3)UL (ref 150–450)
RBC # BLD AUTO: 3.34 X10(6)UL
WBC # BLD AUTO: 8.1 X10(3) UL (ref 4–11)

## 2023-07-04 PROCEDURE — 99024 POSTOP FOLLOW-UP VISIT: CPT | Performed by: SURGERY

## 2023-07-04 PROCEDURE — 99239 HOSP IP/OBS DSCHRG MGMT >30: CPT | Performed by: HOSPITALIST

## 2023-07-04 RX ORDER — HYDROCODONE BITARTRATE AND ACETAMINOPHEN 5; 325 MG/1; MG/1
1 TABLET ORAL EVERY 4 HOURS PRN
Qty: 10 TABLET | Refills: 0 | Status: SHIPPED | OUTPATIENT
Start: 2023-07-04

## 2023-07-04 NOTE — PLAN OF CARE
Patient alert and oriented x4. Vss. On room air. Tolerating diet, no nausea. +flatus, + belch, -BM. Miralax and colace given. Denies pain. Ambulating independently. Fall precautions in place. Call light within reach. Family at bedside. Cleared for discharge. Instructions given. Education given. Dr. Aby Welch office to arrange outpatient treatment with path results.     Problem: Patient Centered Care  Goal: Patient preferences are identified and integrated in the patient's plan of care  Description: Interventions:  - What would you like us to know as we care for you?   - Provide timely, complete, and accurate information to patient/family  - Incorporate patient and family knowledge, values, beliefs, and cultural backgrounds into the planning and delivery of care  - Encourage patient/family to participate in care and decision-making at the level they choose  - Honor patient and family perspectives and choices  Outcome: Adequate for Discharge     Problem: Patient/Family Goals  Goal: Patient/Family Long Term Goal  Description: Patient's Long Term Goal:     Interventions:  -   - See additional Care Plan goals for specific interventions  Outcome: Adequate for Discharge  Goal: Patient/Family Short Term Goal  Description: Patient's Short Term Goal:     Interventions:   -   - See additional Care Plan goals for specific interventions  Outcome: Adequate for Discharge     Problem: GASTROINTESTINAL - ADULT  Goal: Minimal or absence of nausea and vomiting  Description: INTERVENTIONS:  - Maintain adequate hydration with IV or PO as ordered and tolerated  - Nasogastric tube to low intermittent suction as ordered  - Evaluate effectiveness of ordered antiemetic medications  - Provide nonpharmacologic comfort measures as appropriate  - Advance diet as tolerated, if ordered  - Obtain nutritional consult as needed  - Evaluate fluid balance  Outcome: Adequate for Discharge  Goal: Maintains or returns to baseline bowel function  Description: INTERVENTIONS:  - Assess bowel function  - Maintain adequate hydration with IV or PO as ordered and tolerated  - Evaluate effectiveness of GI medications  - Encourage mobilization and activity  - Obtain nutritional consult as needed  - Establish a toileting routine/schedule  - Consider collaborating with pharmacy to review patient's medication profile  Outcome: Adequate for Discharge  Goal: Maintains adequate nutritional intake (undernourished)  Description: INTERVENTIONS:  - Monitor percentage of each meal consumed  - Identify factors contributing to decreased intake, treat as appropriate  - Assist with meals as needed  - Monitor I&O, WT and lab values  - Obtain nutritional consult as needed  - Optimize oral hygiene and moisture  - Encourage food from home; allow for food preferences  - Enhance eating environment  Outcome: Adequate for Discharge  Goal: Achieves appropriate nutritional intake (bariatric)  Description: INTERVENTIONS:  - Monitor for over-consumption  - Identify factors contributing to increased intake, treat as appropriate  - Monitor I&O, WT and lab values  - Obtain nutritional consult as needed  - Evaluate psychosocial factors contributing to over-consumption  Outcome: Adequate for Discharge     Problem: SAFETY ADULT - FALL  Goal: Free from fall injury  Description: INTERVENTIONS:  - Assess pt frequently for physical needs  - Identify cognitive and physical deficits and behaviors that affect risk of falls.   - [place] fall precautions as indicated by assessment.  - Educate pt/family on patient safety including physical limitations  - Instruct pt to call for assistance with activity based on assessment  - Modify environment to reduce risk of injury  - Provide assistive devices as appropriate  - Consider OT/PT consult to assist with strengthening/mobility  - Encourage toileting schedule  Outcome: Adequate for Discharge     Problem: CARDIOVASCULAR - ADULT  Goal: Maintains optimal cardiac output and hemodynamic stability  Description: INTERVENTIONS:  - Monitor vital signs, rhythm, and trends  - Monitor for bleeding, hypotension and signs of decreased cardiac output  - Evaluate effectiveness of vasoactive medications to optimize hemodynamic stability  - Monitor arterial and/or venous puncture sites for bleeding and/or hematoma  - Assess quality of pulses, skin color and temperature  - Assess for signs of decreased coronary artery perfusion - ex.  Angina  - Evaluate fluid balance, assess for edema, trend weights  Outcome: Adequate for Discharge     Problem: RESPIRATORY - ADULT  Goal: Achieves optimal ventilation and oxygenation  Description: INTERVENTIONS:  - Assess for changes in respiratory status  - Assess for changes in mentation and behavior  - Position to facilitate oxygenation and minimize respiratory effort  - Oxygen supplementation based on oxygen saturation or ABGs  - Provide Smoking Cessation handout, if applicable  - Encourage broncho-pulmonary hygiene including cough, deep breathe, Incentive Spirometry  - Assess the need for suctioning and perform as needed  - Assess and instruct to report SOB or any respiratory difficulty  - Respiratory Therapy support as indicated  - Manage/alleviate anxiety  - Monitor for signs/symptoms of CO2 retention  Outcome: Adequate for Discharge     Problem: SKIN/TISSUE INTEGRITY - ADULT  Goal: Skin integrity remains intact  Description: INTERVENTIONS  - Assess and document risk factors for pressure ulcer development  - Assess and document skin integrity  - Monitor for areas of redness and/or skin breakdown  - Initiate interventions, skin care algorithm/standards of care as needed  Outcome: Adequate for Discharge     Problem: Diabetes/Glucose Control  Goal: Glucose maintained within prescribed range  Description: INTERVENTIONS:  - Monitor Blood Glucose as ordered  - Assess for signs and symptoms of hyperglycemia and hypoglycemia  - Administer ordered medications to maintain glucose within target range  - Assess barriers to adequate nutritional intake and initiate nutrition consult as needed  - Instruct patient on self management of diabetes  Outcome: Adequate for Discharge     Problem: PAIN - ADULT  Goal: Verbalizes/displays adequate comfort level or patient's stated pain goal  Description: INTERVENTIONS:  - Encourage pt to monitor pain and request assistance  - Assess pain using appropriate pain scale  - Administer analgesics based on type and severity of pain and evaluate response  - Implement non-pharmacological measures as appropriate and evaluate response  - Consider cultural and social influences on pain and pain management  - Manage/alleviate anxiety  - Utilize distraction and/or relaxation techniques  - Monitor for opioid side effects  - Notify MD/LIP if interventions unsuccessful or patient reports new pain  - Anticipate increased pain with activity and pre-medicate as appropriate  Outcome: Adequate for Discharge

## 2023-07-04 NOTE — PLAN OF CARE
AXO4. RA. [place] speaking. Tolerating low fiber diet. On heparin for DVT prophylaxis. Incision clean,dry and intact. Saline locked. No complains of pain or nausea. + gas, + belching. No bm. Ambulates independently. Bed in lowest position. Call light within reach. Safety measures in place.      Problem: GASTROINTESTINAL - ADULT  Goal: Minimal or absence of nausea and vomiting  Description: INTERVENTIONS:  - Maintain adequate hydration with IV or PO as ordered and tolerated  - Nasogastric tube to low intermittent suction as ordered  - Evaluate effectiveness of ordered antiemetic medications  - Provide nonpharmacologic comfort measures as appropriate  - Advance diet as tolerated, if ordered  - Obtain nutritional consult as needed  - Evaluate fluid balance  Outcome: Progressing  Goal: Maintains or returns to baseline bowel function  Description: INTERVENTIONS:  - Assess bowel function  - Maintain adequate hydration with IV or PO as ordered and tolerated  - Evaluate effectiveness of GI medications  - Encourage mobilization and activity  - Obtain nutritional consult as needed  - Establish a toileting routine/schedule  - Consider collaborating with pharmacy to review patient's medication profile  Outcome: Progressing  Goal: Maintains adequate nutritional intake (undernourished)  Description: INTERVENTIONS:  - Monitor percentage of each meal consumed  - Identify factors contributing to decreased intake, treat as appropriate  - Assist with meals as needed  - Monitor I&O, WT and lab values  - Obtain nutritional consult as needed  - Optimize oral hygiene and moisture  - Encourage food from home; allow for food preferences  - Enhance eating environment  Outcome: Progressing  Goal: Achieves appropriate nutritional intake (bariatric)  Description: INTERVENTIONS:  - Monitor for over-consumption  - Identify factors contributing to increased intake, treat as appropriate  - Monitor I&O, WT and lab values  - Obtain nutritional consult as needed  - Evaluate psychosocial factors contributing to over-consumption  Outcome: Progressing     Problem: CARDIOVASCULAR - ADULT  Goal: Maintains optimal cardiac output and hemodynamic stability  Description: INTERVENTIONS:  - Monitor vital signs, rhythm, and trends  - Monitor for bleeding, hypotension and signs of decreased cardiac output  - Evaluate effectiveness of vasoactive medications to optimize hemodynamic stability  - Monitor arterial and/or venous puncture sites for bleeding and/or hematoma  - Assess quality of pulses, skin color and temperature  - Assess for signs of decreased coronary artery perfusion - ex.  Angina  - Evaluate fluid balance, assess for edema, trend weights  Outcome: Progressing     Problem: SKIN/TISSUE INTEGRITY - ADULT  Goal: Skin integrity remains intact  Description: INTERVENTIONS  - Assess and document risk factors for pressure ulcer development  - Assess and document skin integrity  - Monitor for areas of redness and/or skin breakdown  - Initiate interventions, skin care algorithm/standards of care as needed  Outcome: Progressing     Problem: RESPIRATORY - ADULT  Goal: Achieves optimal ventilation and oxygenation  Description: INTERVENTIONS:  - Assess for changes in respiratory status  - Assess for changes in mentation and behavior  - Position to facilitate oxygenation and minimize respiratory effort  - Oxygen supplementation based on oxygen saturation or ABGs  - Provide Smoking Cessation handout, if applicable  - Encourage broncho-pulmonary hygiene including cough, deep breathe, Incentive Spirometry  - Assess the need for suctioning and perform as needed  - Assess and instruct to report SOB or any respiratory difficulty  - Respiratory Therapy support as indicated  - Manage/alleviate anxiety  - Monitor for signs/symptoms of CO2 retention  Outcome: Progressing     Problem: SAFETY ADULT - FALL  Goal: Free from fall injury  Description: INTERVENTIONS:  - Assess pt frequently for physical needs  - Identify cognitive and physical deficits and behaviors that affect risk of falls.   - [place] fall precautions as indicated by assessment.  - Educate pt/family on patient safety including physical limitations  - Instruct pt to call for assistance with activity based on assessment  - Modify environment to reduce risk of injury  - Provide assistive devices as appropriate  - Consider OT/PT consult to assist with strengthening/mobility  - Encourage toileting schedule  Outcome: Progressing     Problem: Diabetes/Glucose Control  Goal: Glucose maintained within prescribed range  Description: INTERVENTIONS:  - Monitor Blood Glucose as ordered  - Assess for signs and symptoms of hyperglycemia and hypoglycemia  - Administer ordered medications to maintain glucose within target range  - Assess barriers to adequate nutritional intake and initiate nutrition consult as needed  - Instruct patient on self management of diabetes  Outcome: Progressing     Problem: PAIN - ADULT  Goal: Verbalizes/displays adequate comfort level or patient's stated pain goal  Description: INTERVENTIONS:  - Encourage pt to monitor pain and request assistance  - Assess pain using appropriate pain scale  - Administer analgesics based on type and severity of pain and evaluate response  - Implement non-pharmacological measures as appropriate and evaluate response  - Consider cultural and social influences on pain and pain management  - Manage/alleviate anxiety  - Utilize distraction and/or relaxation techniques  - Monitor for opioid side effects  - Notify MD/LIP if interventions unsuccessful or patient reports new pain  - Anticipate increased pain with activity and pre-medicate as appropriate  Outcome: Progressing

## 2023-07-04 NOTE — DISCHARGE INSTRUCTIONS
La oficina de daphne Nunez va hablar para realizar cortez kesha con geraldine resultados de patologia.

## 2023-07-05 ENCOUNTER — TELEPHONE (OUTPATIENT)
Dept: HEMATOLOGY/ONCOLOGY | Facility: HOSPITAL | Age: 58
End: 2023-07-05

## 2023-07-05 ENCOUNTER — TELEPHONE (OUTPATIENT)
Dept: SURGERY | Facility: CLINIC | Age: 58
End: 2023-07-05

## 2023-07-05 ENCOUNTER — TELEPHONE (OUTPATIENT)
Dept: INTERNAL MEDICINE UNIT | Facility: HOSPITAL | Age: 58
End: 2023-07-05

## 2023-07-05 NOTE — TELEPHONE ENCOUNTER
I attempt to reach patient to check how he is feeling after surgery.    I left a message to call us back and help schedule a post op visit 07/11 Rosangela 8:30am

## 2023-07-05 NOTE — TELEPHONE ENCOUNTER
Fax received from [facility] pharmacy stating Hydrocodone acetaminophen tabs prescribed at discharge were not in stock. Discussed with Dr Herrera Guzmán whom advised contacting pt to inquire an alternative pharmacy. No answer, VM left requesting call back.

## 2023-07-05 NOTE — TELEPHONE ENCOUNTER
Language line used Hebrew (#[id number] Rileyia Lexi) Left message for patient. Amairani Neff called and notified of follow up appointment scheduled for patient.  with Dr. Preet Lira 7/11/23 9am.

## 2023-07-08 ENCOUNTER — HOSPITAL ENCOUNTER (EMERGENCY)
Facility: HOSPITAL | Age: 58
Discharge: HOME | End: 2023-07-08
Attending: EMERGENCY MEDICINE
Payer: MEDICAID

## 2023-07-08 VITALS
RESPIRATION RATE: 16 BRPM | HEART RATE: 67 BPM | BODY MASS INDEX: 31.98 KG/M2 | WEIGHT: 211 LBS | HEIGHT: 68 IN | OXYGEN SATURATION: 99 % | SYSTOLIC BLOOD PRESSURE: 107 MMHG | DIASTOLIC BLOOD PRESSURE: 65 MMHG | TEMPERATURE: 97 F

## 2023-07-08 DIAGNOSIS — L08.9 WOUND INFECTION: ICD-10-CM

## 2023-07-08 DIAGNOSIS — S30.1XXA ABDOMINAL WALL SEROMA, INITIAL ENCOUNTER: Primary | ICD-10-CM

## 2023-07-08 DIAGNOSIS — T14.8XXA WOUND INFECTION: ICD-10-CM

## 2023-07-08 LAB
ANION GAP SERPL CALC-SCNC: 7 MMOL/L (ref 0–18)
BASOPHILS # BLD AUTO: 0.08 X10(3) UL (ref 0–0.2)
BASOPHILS NFR BLD AUTO: 1.3 %
BUN BLD-MCNC: 17 MG/DL (ref 7–18)
BUN/CREAT SERPL: 20.7 (ref 10–20)
CALCIUM BLD-MCNC: 8.7 MG/DL (ref 8.5–10.1)
CHLORIDE SERPL-SCNC: 106 MMOL/L (ref 98–112)
CO2 SERPL-SCNC: 27 MMOL/L (ref 21–32)
CREAT BLD-MCNC: 0.82 MG/DL
DEPRECATED RDW RBC AUTO: 60.8 FL (ref 35.1–46.3)
EOSINOPHIL # BLD AUTO: 0.06 X10(3) UL (ref 0–0.7)
EOSINOPHIL NFR BLD AUTO: 0.9 %
ERYTHROCYTE [DISTWIDTH] IN BLOOD BY AUTOMATED COUNT: 20.4 % (ref 11–15)
GFR SERPLBLD BASED ON 1.73 SQ M-ARVRAT: 102 ML/MIN/1.73M2 (ref 60–?)
GLUCOSE BLD-MCNC: 118 MG/DL (ref 70–99)
HCT VFR BLD AUTO: 28.9 %
HGB BLD-MCNC: 8.2 G/DL
IMM GRANULOCYTES # BLD AUTO: 0.02 X10(3) UL (ref 0–1)
IMM GRANULOCYTES NFR BLD: 0.3 %
LYMPHOCYTES # BLD AUTO: 1.65 X10(3) UL (ref 1–4)
LYMPHOCYTES NFR BLD AUTO: 26.1 %
MCH RBC QN AUTO: 23.3 PG (ref 26–34)
MCHC RBC AUTO-ENTMCNC: 28.4 G/DL (ref 31–37)
MCV RBC AUTO: 82.1 FL
MONOCYTES # BLD AUTO: 0.57 X10(3) UL (ref 0.1–1)
MONOCYTES NFR BLD AUTO: 9 %
NEUTROPHILS # BLD AUTO: 3.95 X10 (3) UL (ref 1.5–7.7)
NEUTROPHILS # BLD AUTO: 3.95 X10(3) UL (ref 1.5–7.7)
NEUTROPHILS NFR BLD AUTO: 62.4 %
OSMOLALITY SERPL CALC.SUM OF ELEC: 293 MOSM/KG (ref 275–295)
PLATELET # BLD AUTO: 524 10(3)UL (ref 150–450)
POTASSIUM SERPL-SCNC: 4 MMOL/L (ref 3.5–5.1)
RBC # BLD AUTO: 3.52 X10(6)UL
SODIUM SERPL-SCNC: 140 MMOL/L (ref 136–145)
WBC # BLD AUTO: 6.3 X10(3) UL (ref 4–11)

## 2023-07-08 PROCEDURE — 80048 BASIC METABOLIC PNL TOTAL CA: CPT | Performed by: EMERGENCY MEDICINE

## 2023-07-08 PROCEDURE — 99284 EMERGENCY DEPT VISIT MOD MDM: CPT

## 2023-07-08 PROCEDURE — 85025 COMPLETE CBC W/AUTO DIFF WBC: CPT | Performed by: EMERGENCY MEDICINE

## 2023-07-08 PROCEDURE — 36415 COLL VENOUS BLD VENIPUNCTURE: CPT

## 2023-07-08 PROCEDURE — 99283 EMERGENCY DEPT VISIT LOW MDM: CPT

## 2023-07-08 RX ORDER — SULFAMETHOXAZOLE AND TRIMETHOPRIM 800; 160 MG/1; MG/1
1 TABLET ORAL 2 TIMES DAILY
Qty: 20 TABLET | Refills: 0 | Status: SHIPPED | OUTPATIENT
Start: 2023-07-08 | End: 2023-07-11

## 2023-07-08 RX ORDER — SULFAMETHOXAZOLE AND TRIMETHOPRIM 800; 160 MG/1; MG/1
1 TABLET ORAL ONCE
Status: COMPLETED | OUTPATIENT
Start: 2023-07-08 | End: 2023-07-08

## 2023-07-08 RX ORDER — SULFAMETHOXAZOLE AND TRIMETHOPRIM 800; 160 MG/1; MG/1
1 TABLET ORAL 2 TIMES DAILY
Qty: 20 TABLET | Refills: 0 | Status: SHIPPED | OUTPATIENT
Start: 2023-07-08 | End: 2023-07-18

## 2023-07-11 ENCOUNTER — OFFICE VISIT (OUTPATIENT)
Dept: HEMATOLOGY/ONCOLOGY | Facility: HOSPITAL | Age: 58
End: 2023-07-11
Attending: INTERNAL MEDICINE
Payer: MEDICAID

## 2023-07-11 ENCOUNTER — OFFICE VISIT (OUTPATIENT)
Dept: SURGERY | Facility: CLINIC | Age: 58
End: 2023-07-11
Payer: MEDICAID

## 2023-07-11 VITALS
WEIGHT: 195 LBS | DIASTOLIC BLOOD PRESSURE: 82 MMHG | TEMPERATURE: 98 F | BODY MASS INDEX: 29.55 KG/M2 | RESPIRATION RATE: 16 BRPM | OXYGEN SATURATION: 99 % | SYSTOLIC BLOOD PRESSURE: 130 MMHG | HEART RATE: 79 BPM | HEIGHT: 68 IN

## 2023-07-11 VITALS
TEMPERATURE: 98 F | DIASTOLIC BLOOD PRESSURE: 82 MMHG | OXYGEN SATURATION: 99 % | HEIGHT: 68 IN | SYSTOLIC BLOOD PRESSURE: 130 MMHG | WEIGHT: 195 LBS | HEART RATE: 79 BPM | BODY MASS INDEX: 29.55 KG/M2 | RESPIRATION RATE: 16 BRPM

## 2023-07-11 DIAGNOSIS — D50.0 IRON DEFICIENCY ANEMIA DUE TO CHRONIC BLOOD LOSS: ICD-10-CM

## 2023-07-11 DIAGNOSIS — C43.61 MALIGNANT MELANOMA OF RIGHT UPPER EXTREMITY INCLUDING SHOULDER (HCC): Primary | ICD-10-CM

## 2023-07-11 DIAGNOSIS — C43.9 METASTATIC MELANOMA (HCC): Primary | ICD-10-CM

## 2023-07-11 LAB
ALBUMIN SERPL-MCNC: 3.3 G/DL (ref 3.4–5)
ALBUMIN/GLOB SERPL: 0.7 {RATIO} (ref 1–2)
ALP LIVER SERPL-CCNC: 141 U/L
ALT SERPL-CCNC: 45 U/L
ANION GAP SERPL CALC-SCNC: 6 MMOL/L (ref 0–18)
AST SERPL-CCNC: 20 U/L (ref 15–37)
BASOPHILS # BLD AUTO: 0.09 X10(3) UL (ref 0–0.2)
BASOPHILS NFR BLD AUTO: 1.6 %
BILIRUB SERPL-MCNC: 0.3 MG/DL (ref 0.1–2)
BUN BLD-MCNC: 18 MG/DL (ref 7–18)
BUN/CREAT SERPL: 22.5 (ref 10–20)
CALCIUM BLD-MCNC: 9 MG/DL (ref 8.5–10.1)
CHLORIDE SERPL-SCNC: 106 MMOL/L (ref 98–112)
CO2 SERPL-SCNC: 27 MMOL/L (ref 21–32)
CREAT BLD-MCNC: 0.8 MG/DL
DEPRECATED HBV CORE AB SER IA-ACNC: 15.4 NG/ML
DEPRECATED RDW RBC AUTO: 57.8 FL (ref 35.1–46.3)
EOSINOPHIL # BLD AUTO: 0.06 X10(3) UL (ref 0–0.7)
EOSINOPHIL NFR BLD AUTO: 1.1 %
ERYTHROCYTE [DISTWIDTH] IN BLOOD BY AUTOMATED COUNT: 19.9 % (ref 11–15)
GFR SERPLBLD BASED ON 1.73 SQ M-ARVRAT: 103 ML/MIN/1.73M2 (ref 60–?)
GLOBULIN PLAS-MCNC: 4.6 G/DL (ref 2.8–4.4)
GLUCOSE BLD-MCNC: 123 MG/DL (ref 70–99)
HCT VFR BLD AUTO: 31.8 %
HGB BLD-MCNC: 9.3 G/DL
IMM GRANULOCYTES # BLD AUTO: 0.01 X10(3) UL (ref 0–1)
IMM GRANULOCYTES NFR BLD: 0.2 %
IRON SATN MFR SERPL: 5 %
IRON SERPL-MCNC: 21 UG/DL
LDH SERPL L TO P-CCNC: 192 U/L
LYMPHOCYTES # BLD AUTO: 1.74 X10(3) UL (ref 1–4)
LYMPHOCYTES NFR BLD AUTO: 30.6 %
MCH RBC QN AUTO: 23.3 PG (ref 26–34)
MCHC RBC AUTO-ENTMCNC: 29.2 G/DL (ref 31–37)
MCV RBC AUTO: 79.7 FL
MONOCYTES # BLD AUTO: 0.5 X10(3) UL (ref 0.1–1)
MONOCYTES NFR BLD AUTO: 8.8 %
NEUTROPHILS # BLD AUTO: 3.28 X10 (3) UL (ref 1.5–7.7)
NEUTROPHILS # BLD AUTO: 3.28 X10(3) UL (ref 1.5–7.7)
NEUTROPHILS NFR BLD AUTO: 57.7 %
OSMOLALITY SERPL CALC.SUM OF ELEC: 291 MOSM/KG (ref 275–295)
PLATELET # BLD AUTO: 581 10(3)UL (ref 150–450)
POTASSIUM SERPL-SCNC: 4 MMOL/L (ref 3.5–5.1)
PROT SERPL-MCNC: 7.9 G/DL (ref 6.4–8.2)
RBC # BLD AUTO: 3.99 X10(6)UL
SODIUM SERPL-SCNC: 139 MMOL/L (ref 136–145)
TIBC SERPL-MCNC: 428 UG/DL (ref 240–450)
TRANSFERRIN SERPL-MCNC: 287 MG/DL (ref 200–360)
WBC # BLD AUTO: 5.7 X10(3) UL (ref 4–11)

## 2023-07-11 PROCEDURE — 3008F BODY MASS INDEX DOCD: CPT | Performed by: SURGERY

## 2023-07-11 PROCEDURE — 3075F SYST BP GE 130 - 139MM HG: CPT | Performed by: SURGERY

## 2023-07-11 PROCEDURE — 36415 COLL VENOUS BLD VENIPUNCTURE: CPT

## 2023-07-11 PROCEDURE — 99024 POSTOP FOLLOW-UP VISIT: CPT | Performed by: SURGERY

## 2023-07-11 PROCEDURE — 3079F DIAST BP 80-89 MM HG: CPT | Performed by: SURGERY

## 2023-07-11 PROCEDURE — 99215 OFFICE O/P EST HI 40 MIN: CPT | Performed by: INTERNAL MEDICINE

## 2023-07-11 RX ORDER — FOLIC ACID 1 MG/1
1 TABLET ORAL DAILY
Qty: 90 TABLET | Refills: 2 | Status: SHIPPED | OUTPATIENT
Start: 2023-07-11 | End: 2023-07-25

## 2023-07-17 PROBLEM — D50.0 IRON DEFICIENCY ANEMIA DUE TO CHRONIC BLOOD LOSS: Status: ACTIVE | Noted: 2023-07-17

## 2023-07-18 ENCOUNTER — TELEPHONE (OUTPATIENT)
Dept: HEMATOLOGY/ONCOLOGY | Facility: HOSPITAL | Age: 58
End: 2023-07-18

## 2023-07-18 NOTE — TELEPHONE ENCOUNTER
Called patients niece to schedule iron infusion ordered by Dr India Farfan. Patients niece did not answer did leave message on voicemail to call back to schedule appointment.

## 2023-07-25 ENCOUNTER — OFFICE VISIT (OUTPATIENT)
Dept: SURGERY | Facility: CLINIC | Age: 58
End: 2023-07-25
Payer: MEDICAID

## 2023-07-25 VITALS
WEIGHT: 203 LBS | OXYGEN SATURATION: 98 % | TEMPERATURE: 98 F | HEIGHT: 68 IN | SYSTOLIC BLOOD PRESSURE: 120 MMHG | RESPIRATION RATE: 16 BRPM | DIASTOLIC BLOOD PRESSURE: 74 MMHG | BODY MASS INDEX: 30.77 KG/M2 | HEART RATE: 70 BPM

## 2023-07-25 DIAGNOSIS — C43.9 METASTATIC MELANOMA (HCC): Primary | ICD-10-CM

## 2023-07-25 PROCEDURE — 3074F SYST BP LT 130 MM HG: CPT | Performed by: PHYSICIAN ASSISTANT

## 2023-07-25 PROCEDURE — 99024 POSTOP FOLLOW-UP VISIT: CPT | Performed by: PHYSICIAN ASSISTANT

## 2023-07-25 PROCEDURE — 3008F BODY MASS INDEX DOCD: CPT | Performed by: PHYSICIAN ASSISTANT

## 2023-07-25 PROCEDURE — 3078F DIAST BP <80 MM HG: CPT | Performed by: PHYSICIAN ASSISTANT

## 2023-07-25 NOTE — PROGRESS NOTES
Patient presents for wound check. Midline incision well healing without erythema or tenderness  Small serous drainage at inferior end of incision. Requires dressing changes BID. Per patient, family this is significantly improved from prior. Staples removed and steri strips placed. Discussed wound care recommendation for area of drainage. He may follow up with us on an as needed basis. Lavinia Horn PA-C  Department of [address] [facility] [place] [address] [place], [address]  T: [phone]  F: [phone]

## 2023-08-01 ENCOUNTER — HOSPITAL ENCOUNTER (OUTPATIENT)
Dept: NUCLEAR MEDICINE | Facility: HOSPITAL | Age: 58
Discharge: HOME | End: 2023-08-01
Attending: INTERNAL MEDICINE
Payer: MEDICAID

## 2023-08-01 DIAGNOSIS — C43.61 MALIGNANT MELANOMA OF RIGHT UPPER EXTREMITY INCLUDING SHOULDER (HCC): ICD-10-CM

## 2023-08-01 PROCEDURE — 78816 PET IMAGE W/CT FULL BODY: CPT | Performed by: INTERNAL MEDICINE

## 2023-08-02 ENCOUNTER — OFFICE VISIT (OUTPATIENT)
Dept: HEMATOLOGY/ONCOLOGY | Facility: HOSPITAL | Age: 58
End: 2023-08-02
Attending: INTERNAL MEDICINE
Payer: MEDICAID

## 2023-08-02 VITALS — RESPIRATION RATE: 16 BRPM | HEART RATE: 54 BPM | DIASTOLIC BLOOD PRESSURE: 71 MMHG | SYSTOLIC BLOOD PRESSURE: 121 MMHG

## 2023-08-02 DIAGNOSIS — D50.0 IRON DEFICIENCY ANEMIA DUE TO CHRONIC BLOOD LOSS: ICD-10-CM

## 2023-08-02 DIAGNOSIS — C43.61 MALIGNANT MELANOMA OF RIGHT UPPER EXTREMITY INCLUDING SHOULDER (HCC): Primary | ICD-10-CM

## 2023-08-02 DIAGNOSIS — C78.89 MELANOMA METASTATIC TO DIGESTIVE ORGAN (HCC): ICD-10-CM

## 2023-08-02 LAB
ALBUMIN SERPL-MCNC: 3.7 G/DL (ref 3.4–5)
ALBUMIN/GLOB SERPL: 0.9 {RATIO} (ref 1–2)
ALP LIVER SERPL-CCNC: 100 U/L
ALT SERPL-CCNC: 22 U/L
ANION GAP SERPL CALC-SCNC: 7 MMOL/L (ref 0–18)
AST SERPL-CCNC: 19 U/L (ref 15–37)
BASOPHILS # BLD AUTO: 0.07 X10(3) UL (ref 0–0.2)
BASOPHILS NFR BLD AUTO: 1.3 %
BILIRUB SERPL-MCNC: 0.3 MG/DL (ref 0.1–2)
BUN BLD-MCNC: 17 MG/DL (ref 7–18)
BUN/CREAT SERPL: 20 (ref 10–20)
CALCIUM BLD-MCNC: 8.9 MG/DL (ref 8.5–10.1)
CHLORIDE SERPL-SCNC: 108 MMOL/L (ref 98–112)
CO2 SERPL-SCNC: 26 MMOL/L (ref 21–32)
CREAT BLD-MCNC: 0.85 MG/DL
DEPRECATED RDW RBC AUTO: 53.1 FL (ref 35.1–46.3)
EGFRCR SERPLBLD CKD-EPI 2021: 101 ML/MIN/1.73M2 (ref 60–?)
EOSINOPHIL # BLD AUTO: 0.07 X10(3) UL (ref 0–0.7)
EOSINOPHIL NFR BLD AUTO: 1.3 %
ERYTHROCYTE [DISTWIDTH] IN BLOOD BY AUTOMATED COUNT: 19.3 % (ref 11–15)
GLOBULIN PLAS-MCNC: 4.1 G/DL (ref 2.8–4.4)
GLUCOSE BLD-MCNC: 107 MG/DL (ref 70–99)
HCT VFR BLD AUTO: 33.8 %
HGB BLD-MCNC: 10 G/DL
IMM GRANULOCYTES # BLD AUTO: 0.01 X10(3) UL (ref 0–1)
IMM GRANULOCYTES NFR BLD: 0.2 %
LYMPHOCYTES # BLD AUTO: 2.19 X10(3) UL (ref 1–4)
LYMPHOCYTES NFR BLD AUTO: 40.7 %
MCH RBC QN AUTO: 22.5 PG (ref 26–34)
MCHC RBC AUTO-ENTMCNC: 29.6 G/DL (ref 31–37)
MCV RBC AUTO: 76 FL
MONOCYTES # BLD AUTO: 0.61 X10(3) UL (ref 0.1–1)
MONOCYTES NFR BLD AUTO: 11.3 %
NEUTROPHILS # BLD AUTO: 2.43 X10 (3) UL (ref 1.5–7.7)
NEUTROPHILS # BLD AUTO: 2.43 X10(3) UL (ref 1.5–7.7)
NEUTROPHILS NFR BLD AUTO: 45.2 %
OSMOLALITY SERPL CALC.SUM OF ELEC: 294 MOSM/KG (ref 275–295)
PLATELET # BLD AUTO: 345 10(3)UL (ref 150–450)
POTASSIUM SERPL-SCNC: 4 MMOL/L (ref 3.5–5.1)
PROT SERPL-MCNC: 7.8 G/DL (ref 6.4–8.2)
RBC # BLD AUTO: 4.45 X10(6)UL
SODIUM SERPL-SCNC: 141 MMOL/L (ref 136–145)
T4 FREE SERPL-MCNC: 0.9 NG/DL (ref 0.8–1.7)
TSI SER-ACNC: 1.65 MIU/ML (ref 0.36–3.74)
WBC # BLD AUTO: 5.4 X10(3) UL (ref 4–11)

## 2023-08-02 PROCEDURE — 84439 ASSAY OF FREE THYROXINE: CPT

## 2023-08-02 PROCEDURE — 85025 COMPLETE CBC W/AUTO DIFF WBC: CPT

## 2023-08-02 PROCEDURE — 99215 OFFICE O/P EST HI 40 MIN: CPT | Performed by: NURSE PRACTITIONER

## 2023-08-02 PROCEDURE — 96374 THER/PROPH/DIAG INJ IV PUSH: CPT

## 2023-08-02 PROCEDURE — 84443 ASSAY THYROID STIM HORMONE: CPT

## 2023-08-02 PROCEDURE — 80053 COMPREHEN METABOLIC PANEL: CPT

## 2023-08-02 RX ORDER — PROCHLORPERAZINE MALEATE 10 MG
10 TABLET ORAL EVERY 8 HOURS PRN
Qty: 30 TABLET | Refills: 3 | Status: SHIPPED | OUTPATIENT
Start: 2023-08-02

## 2023-08-02 RX ORDER — ONDANSETRON HYDROCHLORIDE 8 MG/1
8 TABLET, FILM COATED ORAL EVERY 8 HOURS PRN
Qty: 30 TABLET | Refills: 3 | Status: SHIPPED | OUTPATIENT
Start: 2023-08-02

## 2023-08-02 NOTE — PROGRESS NOTES
Pt arrived for Venofer 1 of 5 and prechemo labs. Family member translated. He has received Venofer in the past.    Given thru free-flowing IV. PIV started on 3rd attempt. Discussed with Joyce mahmood of [facility] for treatment. Will try to push fluids for treatment and see how it goes. Tolerated Venofer, vitals stable 30 min post. AVS given.  Discharged home ambulatory

## 2023-08-02 NOTE — PROGRESS NOTES
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 Patient here post hospital follow-up. For detailed information on his prior therapy, see my hospital consultation report on 7/2/2023. Patient recovering from surgery of the small bowel with removal of 2 metastatic melanoma masses. Staples removed. Steri strips in place. Abd drsg with serous drainage, steri strips in place. Drsg changed twice aily. Appetite is good. Denies pain or discomfort. ECOG PS 0    Here today with daughter in law Troy Prior    Review of Systems:   Review of Systems   Constitutional:  Positive for fatigue. Negative for appetite change and unexpected weight change. Respiratory:  Negative for cough and shortness of breath. Cardiovascular:  Negative for chest pain. Gastrointestinal:  Negative for abdominal pain, blood in stool, constipation, diarrhea, nausea and vomiting. Genitourinary:  Negative for dysuria and frequency. Musculoskeletal:  Negative for arthralgias, back pain and gait problem. Skin:  Positive for wound (some serous drainage). Neurological:  Negative for dizziness, extremity weakness, gait problem, headaches, numbness, seizures and speech difficulty. Hematological:  Negative for adenopathy. Psychiatric/Behavioral:  Negative for sleep disturbance. No current outpatient medications on file.      Allergies:   No Known Allergies    Past Medical History:   Diagnosis Date    Anemia     with multiple blood transfusion    GIB (gastrointestinal bleeding) 12/2022    Melanoma ([facility] [address] 06/01/2021    right shoulder s/p surgical excision and skin graft, adjuvant BRAF/MEK inhibitors for 1.5 yrs     Past Surgical History:   Procedure Laterality Date    UPPER GI ENDOSCOPY,EXAM  12/01/2022 Social History     Socioeconomic History    Marital status: Single   Tobacco Use    Smoking status: Never    Smokeless tobacco: Never   Vaping Use    Vaping Use: Never used   Substance and Sexual Activity    Alcohol use: Not Currently    Drug use: Never       No family history on file. PHYSICAL EXAM:    There were no vitals taken for this visit. Wt Readings from Last 6 Encounters:  07/25/23 : 92.1 kg (203 lb)  07/11/23 : 88.5 kg (195 lb)  07/11/23 : 88.5 kg (195 lb)  07/08/23 : 95.7 kg (211 lb)  06/28/23 : 97.2 kg (214 lb 4.6 oz)    Physical Exam  General: Patient is alert, not in acute distress. Pale. HEENT: EOMs intact. PERRL. Neck: No JVD. No palpable lymphadenopathy. Neck is supple. Chest: Clear to auscultation. Heart: Regular rate and rhythm. Abdomen: Soft, non tender with good bowel sounds. Ventral scar with steri strips in place, sm amount of serous drainage noted. Extremities: No edema. Neurological: Grossly intact. Lymphatics: There is no palpable lymphadenopathy throughout in the cervical, supraclavicular, axillary, or inguinal regions. Psych/Depression: nl        ASSESSMENT/PLAN:   Malignant melanoma of right upper extremity including shoulder (HCC)  (primary encounter diagnosis)  Melanoma metastatic to digestive organ (HCC)  Iron deficiency anemia due to chronic blood loss      1. Malignant melanoma of right upper extremity including shoulder (HCC)  Mets to bowel s/p resection     2. Iron deficiency anemia due to chronic blood loss  Venofer infusions - starting today       Immunotherapy teaching completed. Reviewed potential side effects of treatment    Cancer treatment education, including treatment plan, supportive medications, and post-treatment care was provided to the patient. The patient/support person  was attentive during education, verbalized understanding, all questions were answered and patient was instructed to call with further questions.      Reinforcement of education is needed at next visit? Yes  Include language provided and  information (if applicable). Sai Krueger daughter in law translated per pt request. Declined use of language line. Psychosocial concerns or referrals made Kilo Hollingsworth provided     Denies chronic infections. Denies pain or discomfort. Here visiting family when he became sick. Family will plan to support him during this time. Discussed with surgery serous drainage from incision - OK to proceed with treatment. This visit lasted 60 minutes with greater than 50% of the visit for discussion of symptom management and treatment. No orders of the defined types were placed in this encounter. Results From Past 48 Hours:  Recent Results (from the past 48 hour(s))   COMP METABOLIC PANEL (14)    Collection Time: 08/02/23 10:47 AM   Result Value Ref Range    Glucose 107 (H) 70 - 99 mg/dL    Sodium 141 136 - 145 mmol/L    Potassium 4.0 3.5 - 5.1 mmol/L    Chloride 108 98 - 112 mmol/L    CO2 26.0 21.0 - 32.0 mmol/L    Anion Gap 7 0 - 18 mmol/L    BUN 17 7 - 18 mg/dL    Creatinine 0.85 0.70 - 1.30 mg/dL    BUN/CREA Ratio 20.0 10.0 - 20.0    Calcium, Total 8.9 8.5 - 10.1 mg/dL    Calculated Osmolality 294 275 - 295 mOsm/kg    eGFR-Cr 101 >=60 mL/min/1.73m2    ALT 22 16 - 61 U/L    AST 19 15 - 37 U/L    Alkaline Phosphatase 100 45 - 117 U/L    Bilirubin, Total 0.3 0.1 - 2.0 mg/dL    Total Protein 7.8 6.4 - 8.2 g/dL    Albumin 3.7 3.4 - 5.0 g/dL    Globulin  4.1 2.8 - 4.4 g/dL    A/G Ratio 0.9 (L) 1.0 - 2.0    Patient Fasting for CMP?  Patient not present    ASSAY, THYROID STIM HORMONE    Collection Time: 08/02/23 10:47 AM   Result Value Ref Range    TSH 1.650 0.358 - 3.740 mIU/mL   FREE T4 (FREE THYROXINE)    Collection Time: 08/02/23 10:47 AM   Result Value Ref Range    Free T4 0.9 0.8 - 1.7 ng/dL   CBC W/ DIFFERENTIAL    Collection Time: 08/02/23 10:47 AM   Result Value Ref Range    WBC 5.4 4.0 - 11.0 x10(3) uL    RBC 4.45 4.30 - 5.70 x10(6)uL    HGB 10.0 (L) 13.0 - 17.5 g/dL    HCT 33.8 (L) 39.0 - 53.0 %    MCV 76.0 (L) 80.0 - 100.0 fL    MCH 22.5 (L) 26.0 - 34.0 pg    MCHC 29.6 (L) 31.0 - 37.0 g/dL    RDW-SD 53.1 (H) 35.1 - 46.3 fL    RDW 19.3 (H) 11.0 - 15.0 %    .0 150.0 - 450.0 10(3)uL    Neutrophil Absolute Prelim 2.43 1.50 - 7.70 x10 (3) uL    Neutrophil Absolute 2.43 1.50 - 7.70 x10(3) uL    Lymphocyte Absolute 2.19 1.00 - 4.00 x10(3) uL    Monocyte Absolute 0.61 0.10 - 1.00 x10(3) uL    Eosinophil Absolute 0.07 0.00 - 0.70 x10(3) uL    Basophil Absolute 0.07 0.00 - 0.20 x10(3) uL    Immature Granulocyte Absolute 0.01 0.00 - 1.00 x10(3) uL    Neutrophil % 45.2 %    Lymphocyte % 40.7 %    Monocyte % 11.3 %    Eosinophil % 1.3 %    Basophil % 1.3 %    Immature Granulocyte % 0.2 %         Imaging & Referrals:  None   No orders of the defined types were placed in this encounter. PROCEDURE: PET/CT (NON-STAND) SCANS(SKULL TO TOES) ([id number]=[id number])     COMPARISON: [facility], [address] [place] [address] ([id number]), 6/29/2023, 4:15 PM.     INDICATIONS: C43.61 Malignant melanoma of right upper extremity including shoulder (HCC)     TECHNIQUE: After obtaining the patient's consent, 89.6 millicuries of S-15 FDG was administered into the right hand vein. Whole body PET/CT imaging was performed of the entire body, skull to feet, with multi-planar imaging without oral or intravenous  contrast material, using a dedicated integrated PET/CT scanner. PATIENT BLOOD GLUCOSE: 126 mg/dL. UPTAKE TIME:  61 minutes     FINDINGS:  Attenuation corrected and non-attenuation corrected images were reviewed from the head to the feet. Mediastinal blood pool is 1.8 max SUV, and hepatic blood pool is 2.5 max SUV. HEAD/NECK: No hypermetabolic lung lesion or adenopathy. Subcentimeter left axillary lymph node with SUV max of 1.8, likely reactive  Thorax: No hypermetabolic lesion.     ABDOMEN/ PELVIS: 3 hypermetabolic lymph nodes to the right of the mesenteric root with SUV max up to 11.1     No hypermetabolic bowel mass     Hypermetabolism at the midline laparotomy site, likely postsurgical     Subcentimeter left axillary lymph node with SUV max of 2.5, likely reactive     . MUSCULOSKELETAL: No hypermetabolic lesion  LOWER EXTREMITIES: No hypermetabolic lesion. Impression   CONCLUSION:  3 hypermetabolic lymph nodes along the right aspect of the mesenteric root. No other evidence of metastatic disease        Hypermetabolism throughout the midline laparotomy site, presumably postsurgical     Subcentimeter lymph node in the left axilla and in the left groin both which show low level metabolism but with activity less than background liver activity.   These are likely reactive        Dictated by (CST): Kamron Tarango MD on 8/01/2023 at 11:19 AM      Finalized by (CST): Kamron Tarango MD on 8/01/2023 at 11:25 AM

## 2023-08-04 ENCOUNTER — NURSE ONLY (OUTPATIENT)
Dept: HEMATOLOGY/ONCOLOGY | Facility: HOSPITAL | Age: 58
End: 2023-08-04
Attending: INTERNAL MEDICINE
Payer: MEDICAID

## 2023-08-04 VITALS
HEART RATE: 61 BPM | TEMPERATURE: 98 F | SYSTOLIC BLOOD PRESSURE: 126 MMHG | BODY MASS INDEX: 31.52 KG/M2 | RESPIRATION RATE: 16 BRPM | DIASTOLIC BLOOD PRESSURE: 66 MMHG | HEIGHT: 68 IN | WEIGHT: 208 LBS | OXYGEN SATURATION: 98 %

## 2023-08-04 DIAGNOSIS — C43.61 MALIGNANT MELANOMA OF RIGHT UPPER EXTREMITY INCLUDING SHOULDER (HCC): Primary | ICD-10-CM

## 2023-08-04 DIAGNOSIS — D50.0 IRON DEFICIENCY ANEMIA DUE TO CHRONIC BLOOD LOSS: ICD-10-CM

## 2023-08-04 PROCEDURE — 96417 CHEMO IV INFUS EACH ADDL SEQ: CPT

## 2023-08-04 PROCEDURE — 96375 TX/PRO/DX INJ NEW DRUG ADDON: CPT

## 2023-08-04 PROCEDURE — 96413 CHEMO IV INFUSION 1 HR: CPT

## 2023-08-04 NOTE — PROGRESS NOTES
Pt here for C1D1 OPDIVO AND YERVOY AND VENOFER 200MG 2 OF 5. Arrives Ambulating independently, accompanied by Self       Oral medications included in this regimen:  no    Patient confirms comprehension of cancer treatment schedule:  yes  Declined language line. Michelle FERNANDEZ assisted with communication. Pregnancy screening:  Not applicable    Modifications in dose or schedule:  No    PIV placed to rt forearm vein. Venofer given slow IVP via side port of a free flowing bag of normal saline. Pt observed x 30 minutes. Opdivo given as ordered.     Medications appearance and physical integrity checked by RN:  yes     Frequency of blood return and site check throughout administration: Prior to administration, Prior to each drug, and At completion of therapy     Infusion/treatment outcome:  patient tolerated treatment without incident    Education Record    Learner:  Patient and Family Member  Barriers / Limitations:  Language declined language line, family member assisted with communication  Method:  Discussion  Education / instructions given:  reinforced chemo education  Outcome:  Shows understanding    Discharged Home, Ambulating independently, accompanied by:Family member    Patient/family verbalized understanding of future appointments: by printed AVS

## 2023-08-07 ENCOUNTER — TELEPHONE (OUTPATIENT)
Dept: HEMATOLOGY/ONCOLOGY | Facility: HOSPITAL | Age: 58
End: 2023-08-07

## 2023-08-07 NOTE — TELEPHONE ENCOUNTER
Toxicities: C1 D1 Ipilimumab/Nivolumab on 8/4/2023    : Leighton Ba # [id number]    Gina Low reports that he is feeling \"good. \" No side effects at this time. I encouraged him to please call the office if he is not feeling well or has any questions or concerns. He agreed and thanked me for checking on him.

## 2023-08-08 ENCOUNTER — SOCIAL WORK SERVICES (OUTPATIENT)
Dept: HEMATOLOGY/ONCOLOGY | Facility: HOSPITAL | Age: 58
End: 2023-08-08

## 2023-08-08 ENCOUNTER — HOSPITAL ENCOUNTER (OUTPATIENT)
Dept: MRI IMAGING | Facility: HOSPITAL | Age: 58
Discharge: HOME | End: 2023-08-08
Attending: INTERNAL MEDICINE
Payer: MEDICAID

## 2023-08-08 DIAGNOSIS — C43.61 MALIGNANT MELANOMA OF RIGHT UPPER EXTREMITY INCLUDING SHOULDER (HCC): ICD-10-CM

## 2023-08-08 PROCEDURE — A9575 INJ GADOTERATE MEGLUMI 0.1ML: HCPCS | Performed by: INTERNAL MEDICINE

## 2023-08-08 PROCEDURE — 70553 MRI BRAIN STEM W/O & W/DYE: CPT | Performed by: INTERNAL MEDICINE

## 2023-08-08 RX ORDER — GADOTERATE MEGLUMINE 376.9 MG/ML
20 INJECTION INTRAVENOUS
Status: COMPLETED | OUTPATIENT
Start: 2023-08-08 | End: 2023-08-08

## 2023-08-08 RX ADMIN — GADOTERATE MEGLUMINE 20 ML: 376.9 INJECTION INTRAVENOUS at 17:31:00

## 2023-08-08 NOTE — PROGRESS NOTES
SW called patient with the use of the  line. The call was answered by the patient's son. SW introduced self and explain the reason for the call was to answer questions regarding reapplying for Medicaid. Patient currently has Medicaid and  his son says that he just received it a few months ago and has not been instructed to renew. SW provided number to be contacted if thee are any further needs.

## 2023-08-10 ENCOUNTER — OFFICE VISIT (OUTPATIENT)
Dept: HEMATOLOGY/ONCOLOGY | Facility: HOSPITAL | Age: 58
End: 2023-08-10
Attending: INTERNAL MEDICINE
Payer: MEDICAID

## 2023-08-10 VITALS
DIASTOLIC BLOOD PRESSURE: 70 MMHG | RESPIRATION RATE: 16 BRPM | OXYGEN SATURATION: 99 % | SYSTOLIC BLOOD PRESSURE: 115 MMHG | TEMPERATURE: 99 F | HEART RATE: 64 BPM

## 2023-08-10 DIAGNOSIS — D50.0 IRON DEFICIENCY ANEMIA DUE TO CHRONIC BLOOD LOSS: Primary | ICD-10-CM

## 2023-08-10 PROCEDURE — 96374 THER/PROPH/DIAG INJ IV PUSH: CPT

## 2023-08-10 NOTE — PROGRESS NOTES
Pt arrived for Venofer 3 of 5 . Denies any problems with past venofer. PIV placed left AC, Venofer given via side port of free flowing NS IV. , Good blood return throughout. Pt observed for 30 minutes post infusion. No adverse reaction noted. PIV removed. No redness swelling or discoloration at site. 2x2 and coban applied. Discharged home with future appointments.

## 2023-08-14 ENCOUNTER — OFFICE VISIT (OUTPATIENT)
Dept: HEMATOLOGY/ONCOLOGY | Facility: HOSPITAL | Age: 58
End: 2023-08-14
Attending: INTERNAL MEDICINE
Payer: MEDICAID

## 2023-08-14 VITALS
TEMPERATURE: 98 F | OXYGEN SATURATION: 98 % | SYSTOLIC BLOOD PRESSURE: 124 MMHG | DIASTOLIC BLOOD PRESSURE: 69 MMHG | HEART RATE: 57 BPM | RESPIRATION RATE: 16 BRPM

## 2023-08-14 DIAGNOSIS — D50.0 IRON DEFICIENCY ANEMIA DUE TO CHRONIC BLOOD LOSS: Primary | ICD-10-CM

## 2023-08-14 PROCEDURE — 96374 THER/PROPH/DIAG INJ IV PUSH: CPT

## 2023-08-14 NOTE — PROGRESS NOTES
Patient arrives to infusion for Venofer 200mg dose 4 of 5, denies new issues or complaints to this RN. Reviewed plan for day. Venofer given IVP over 2 mins with free flowing NS, 30 mins observation period completed, no s/s of adverse reaction noted. VSS. PIV flushed with NS, removed. Gauze and coban applied to site. Patient discharged stable to home aware of upcoming appts.

## 2023-08-18 ENCOUNTER — OFFICE VISIT (OUTPATIENT)
Dept: HEMATOLOGY/ONCOLOGY | Facility: HOSPITAL | Age: 58
End: 2023-08-18
Attending: INTERNAL MEDICINE
Payer: MEDICAID

## 2023-08-18 VITALS
DIASTOLIC BLOOD PRESSURE: 67 MMHG | HEART RATE: 57 BPM | TEMPERATURE: 99 F | SYSTOLIC BLOOD PRESSURE: 119 MMHG | RESPIRATION RATE: 16 BRPM | OXYGEN SATURATION: 97 %

## 2023-08-18 DIAGNOSIS — D50.0 IRON DEFICIENCY ANEMIA DUE TO CHRONIC BLOOD LOSS: Primary | ICD-10-CM

## 2023-08-18 PROCEDURE — 96374 THER/PROPH/DIAG INJ IV PUSH: CPT

## 2023-08-18 NOTE — PROGRESS NOTES
Patient arrives to infusion for Venofer 200mg dose 5 of 5, denies new issues or complaints       Venofer given IVP over 2 mins and tolerated well     30 mins observation period completed, no s/s of adverse reaction noted. VSS. PIV flushed with NS, removed. Gauze and coban applied to site. Patient discharged stable to home aware of upcoming appts.

## 2023-08-25 ENCOUNTER — OFFICE VISIT (OUTPATIENT)
Dept: HEMATOLOGY/ONCOLOGY | Facility: HOSPITAL | Age: 58
End: 2023-08-25
Attending: INTERNAL MEDICINE
Payer: MEDICAID

## 2023-08-25 VITALS
DIASTOLIC BLOOD PRESSURE: 72 MMHG | HEART RATE: 61 BPM | SYSTOLIC BLOOD PRESSURE: 124 MMHG | RESPIRATION RATE: 16 BRPM | HEIGHT: 68 IN | OXYGEN SATURATION: 99 % | TEMPERATURE: 98 F | BODY MASS INDEX: 31.98 KG/M2 | WEIGHT: 211 LBS

## 2023-08-25 DIAGNOSIS — C78.89 MELANOMA METASTATIC TO DIGESTIVE ORGAN (HCC): Primary | ICD-10-CM

## 2023-08-25 DIAGNOSIS — Z51.12 ENCOUNTER FOR ANTINEOPLASTIC IMMUNOTHERAPY: ICD-10-CM

## 2023-08-25 DIAGNOSIS — C43.61 MALIGNANT MELANOMA OF RIGHT UPPER EXTREMITY INCLUDING SHOULDER (HCC): Primary | ICD-10-CM

## 2023-08-25 DIAGNOSIS — D50.0 IRON DEFICIENCY ANEMIA DUE TO CHRONIC BLOOD LOSS: ICD-10-CM

## 2023-08-25 LAB
ALBUMIN SERPL-MCNC: 3.8 G/DL (ref 3.4–5)
ALBUMIN/GLOB SERPL: 0.9 {RATIO} (ref 1–2)
ALP LIVER SERPL-CCNC: 112 U/L
ALT SERPL-CCNC: 21 U/L
ANION GAP SERPL CALC-SCNC: 3 MMOL/L (ref 0–18)
AST SERPL-CCNC: 13 U/L (ref 15–37)
BASOPHILS # BLD AUTO: 0.13 X10(3) UL (ref 0–0.2)
BASOPHILS NFR BLD AUTO: 2.1 %
BILIRUB SERPL-MCNC: 0.7 MG/DL (ref 0.1–2)
BUN BLD-MCNC: 19 MG/DL (ref 7–18)
BUN/CREAT SERPL: 20.7 (ref 10–20)
CALCIUM BLD-MCNC: 9 MG/DL (ref 8.5–10.1)
CHLORIDE SERPL-SCNC: 109 MMOL/L (ref 98–112)
CO2 SERPL-SCNC: 29 MMOL/L (ref 21–32)
CREAT BLD-MCNC: 0.92 MG/DL
DEPRECATED RDW RBC AUTO: 76.1 FL (ref 35.1–46.3)
EGFRCR SERPLBLD CKD-EPI 2021: 96 ML/MIN/1.73M2 (ref 60–?)
EOSINOPHIL # BLD AUTO: 0.67 X10(3) UL (ref 0–0.7)
EOSINOPHIL NFR BLD AUTO: 10.8 %
ERYTHROCYTE [DISTWIDTH] IN BLOOD BY AUTOMATED COUNT: 26.3 % (ref 11–15)
GLOBULIN PLAS-MCNC: 4.3 G/DL (ref 2.8–4.4)
GLUCOSE BLD-MCNC: 242 MG/DL (ref 70–99)
HCT VFR BLD AUTO: 38.2 %
HGB BLD-MCNC: 11.9 G/DL
IMM GRANULOCYTES # BLD AUTO: 0.01 X10(3) UL (ref 0–1)
IMM GRANULOCYTES NFR BLD: 0.2 %
LYMPHOCYTES # BLD AUTO: 1.86 X10(3) UL (ref 1–4)
LYMPHOCYTES NFR BLD AUTO: 30 %
MCH RBC QN AUTO: 25.4 PG (ref 26–34)
MCHC RBC AUTO-ENTMCNC: 31.2 G/DL (ref 31–37)
MCV RBC AUTO: 81.4 FL
MONOCYTES # BLD AUTO: 0.58 X10(3) UL (ref 0.1–1)
MONOCYTES NFR BLD AUTO: 9.4 %
NEUTROPHILS # BLD AUTO: 2.95 X10 (3) UL (ref 1.5–7.7)
NEUTROPHILS # BLD AUTO: 2.95 X10(3) UL (ref 1.5–7.7)
NEUTROPHILS NFR BLD AUTO: 47.5 %
OSMOLALITY SERPL CALC.SUM OF ELEC: 302 MOSM/KG (ref 275–295)
PLATELET # BLD AUTO: 204 10(3)UL (ref 150–450)
PLATELET MORPHOLOGY: NORMAL
POTASSIUM SERPL-SCNC: 3.8 MMOL/L (ref 3.5–5.1)
PROT SERPL-MCNC: 8.1 G/DL (ref 6.4–8.2)
RBC # BLD AUTO: 4.69 X10(6)UL
SODIUM SERPL-SCNC: 141 MMOL/L (ref 136–145)
WBC # BLD AUTO: 6.2 X10(3) UL (ref 4–11)

## 2023-08-25 PROCEDURE — 96413 CHEMO IV INFUSION 1 HR: CPT

## 2023-08-25 PROCEDURE — 85025 COMPLETE CBC W/AUTO DIFF WBC: CPT

## 2023-08-25 PROCEDURE — 80053 COMPREHEN METABOLIC PANEL: CPT

## 2023-08-25 PROCEDURE — 96415 CHEMO IV INFUSION ADDL HR: CPT

## 2023-08-25 PROCEDURE — 99215 OFFICE O/P EST HI 40 MIN: CPT | Performed by: PHYSICIAN ASSISTANT

## 2023-08-25 PROCEDURE — 96417 CHEMO IV INFUS EACH ADDL SEQ: CPT

## 2023-08-25 NOTE — PROGRESS NOTES
Pt here for C2D1 Yervoy and Opdivo. Arrives Ambulating independently, accompanied by Self and Family member from [facility]. PIV previously placed in the lab- good blood return noted. Oral medications included in this regimen:  no    Patient confirms comprehension of cancer treatment schedule:  yes    Pregnancy screening:  Not applicable    Modifications in dose or schedule:  No    Medications appearance and physical integrity checked by RN. Chemotherapy IV pump settings verified by 2 RNs:  yes     Frequency of blood return and site check throughout administration: Prior to administration, Prior to each drug, and At completion of therapy     Infusion/treatment outcome:  patient tolerated treatment without incident. PIV removed and gauze/tape applied to site. Education Record    Learner:  Patient  Barriers / Limitations:  None  Method:  Discussion  Education / instructions given:  Plan of care and future appointment's.    Outcome:  Shows understanding    Discharged Home, Ambulating independently, accompanied by:Self and Family member    Patient/family verbalized understanding of future appointments: by printed AVS

## 2023-08-30 ENCOUNTER — HOSPITAL ENCOUNTER (EMERGENCY)
Facility: HOSPITAL | Age: 58
Discharge: HOME | End: 2023-08-30
Attending: EMERGENCY MEDICINE
Payer: MEDICAID

## 2023-08-30 ENCOUNTER — APPOINTMENT (OUTPATIENT)
Dept: CT IMAGING | Facility: HOSPITAL | Age: 58
End: 2023-08-30
Attending: EMERGENCY MEDICINE
Payer: MEDICAID

## 2023-08-30 VITALS
TEMPERATURE: 99 F | OXYGEN SATURATION: 97 % | RESPIRATION RATE: 18 BRPM | HEART RATE: 68 BPM | BODY MASS INDEX: 32 KG/M2 | WEIGHT: 212 LBS | DIASTOLIC BLOOD PRESSURE: 82 MMHG | SYSTOLIC BLOOD PRESSURE: 128 MMHG

## 2023-08-30 DIAGNOSIS — K11.5 SALIVARY DUCT STONE: Primary | ICD-10-CM

## 2023-08-30 LAB
ANION GAP SERPL CALC-SCNC: 14 MMOL/L (ref 0–18)
BASOPHILS # BLD AUTO: 0.14 X10(3) UL (ref 0–0.2)
BASOPHILS NFR BLD AUTO: 1.9 %
BUN BLD-MCNC: 18 MG/DL (ref 7–18)
BUN/CREAT SERPL: 18 (ref 10–20)
CALCIUM BLD-MCNC: 9.3 MG/DL (ref 8.5–10.1)
CHLORIDE SERPL-SCNC: 106 MMOL/L (ref 98–112)
CO2 SERPL-SCNC: 19 MMOL/L (ref 21–32)
CREAT BLD-MCNC: 1 MG/DL
DEPRECATED RDW RBC AUTO: 79.5 FL (ref 35.1–46.3)
EGFRCR SERPLBLD CKD-EPI 2021: 87 ML/MIN/1.73M2 (ref 60–?)
EOSINOPHIL # BLD AUTO: 0.46 X10(3) UL (ref 0–0.7)
EOSINOPHIL NFR BLD AUTO: 6.1 %
ERYTHROCYTE [DISTWIDTH] IN BLOOD BY AUTOMATED COUNT: 26.6 % (ref 11–15)
GLUCOSE BLD-MCNC: 108 MG/DL (ref 70–99)
HCT VFR BLD AUTO: 37.9 %
HGB BLD-MCNC: 11.8 G/DL
IMM GRANULOCYTES # BLD AUTO: 0.02 X10(3) UL (ref 0–1)
IMM GRANULOCYTES NFR BLD: 0.3 %
LYMPHOCYTES # BLD AUTO: 2.06 X10(3) UL (ref 1–4)
LYMPHOCYTES NFR BLD AUTO: 27.4 %
MCH RBC QN AUTO: 26.2 PG (ref 26–34)
MCHC RBC AUTO-ENTMCNC: 31.1 G/DL (ref 31–37)
MCV RBC AUTO: 84 FL
MONOCYTES # BLD AUTO: 0.97 X10(3) UL (ref 0.1–1)
MONOCYTES NFR BLD AUTO: 12.9 %
NEUTROPHILS # BLD AUTO: 3.87 X10 (3) UL (ref 1.5–7.7)
NEUTROPHILS # BLD AUTO: 3.87 X10(3) UL (ref 1.5–7.7)
NEUTROPHILS NFR BLD AUTO: 51.4 %
OSMOLALITY SERPL CALC.SUM OF ELEC: 290 MOSM/KG (ref 275–295)
PLATELET # BLD AUTO: 188 10(3)UL (ref 150–450)
POTASSIUM SERPL-SCNC: 3.7 MMOL/L (ref 3.5–5.1)
RBC # BLD AUTO: 4.51 X10(6)UL
SODIUM SERPL-SCNC: 139 MMOL/L (ref 136–145)
WBC # BLD AUTO: 7.5 X10(3) UL (ref 4–11)

## 2023-08-30 PROCEDURE — 80048 BASIC METABOLIC PNL TOTAL CA: CPT | Performed by: EMERGENCY MEDICINE

## 2023-08-30 PROCEDURE — 99284 EMERGENCY DEPT VISIT MOD MDM: CPT

## 2023-08-30 PROCEDURE — 85025 COMPLETE CBC W/AUTO DIFF WBC: CPT | Performed by: EMERGENCY MEDICINE

## 2023-08-30 PROCEDURE — 70491 CT SOFT TISSUE NECK W/DYE: CPT | Performed by: EMERGENCY MEDICINE

## 2023-08-30 PROCEDURE — 36415 COLL VENOUS BLD VENIPUNCTURE: CPT

## 2023-08-30 RX ORDER — IBUPROFEN 600 MG/1
600 TABLET ORAL ONCE
Status: COMPLETED | OUTPATIENT
Start: 2023-08-30 | End: 2023-08-30

## 2023-08-30 RX ORDER — PREDNISONE 20 MG/1
40 TABLET ORAL DAILY
Qty: 10 TABLET | Refills: 0 | Status: SHIPPED | OUTPATIENT
Start: 2023-08-30 | End: 2023-09-04

## 2023-08-30 RX ORDER — PREDNISONE 20 MG/1
40 TABLET ORAL ONCE
Status: COMPLETED | OUTPATIENT
Start: 2023-08-30 | End: 2023-08-30

## 2023-08-30 RX ORDER — AMOXICILLIN AND CLAVULANATE POTASSIUM 875; 125 MG/1; MG/1
1 TABLET, FILM COATED ORAL 2 TIMES DAILY
Qty: 20 TABLET | Refills: 0 | Status: SHIPPED | OUTPATIENT
Start: 2023-08-30 | End: 2023-09-09

## 2023-08-30 RX ORDER — IBUPROFEN 600 MG/1
600 TABLET ORAL EVERY 8 HOURS PRN
Qty: 30 TABLET | Refills: 0 | Status: SHIPPED | OUTPATIENT
Start: 2023-08-30

## 2023-08-30 NOTE — ED INITIAL ASSESSMENT (HPI)
Patient here with c/o left side jaw swelling that began yesterday, states he is currently receiving cancer treatment, last treatment was last week. Also c/o difficulty swallowing and pain when he tries to eat food. Denies any difficulty breathing. He is managing his secretions, respirations are regular, non labored.

## 2023-08-31 NOTE — DISCHARGE INSTRUCTIONS
Take medications as prescribed. Applying heat and gently massaging your enlarged salivary gland may help reduce the swelling. Also sucking on sour candy such as lemon drops or actual krista several times per day may help reduce swelling. Please follow-up with Dr. Anthony Cifuentes for management.

## 2023-09-11 ENCOUNTER — TELEPHONE (OUTPATIENT)
Dept: OTOLARYNGOLOGY | Facility: CLINIC | Age: 58
End: 2023-09-11

## 2023-09-11 NOTE — TELEPHONE ENCOUNTER
Called patient. Spoke with patient's son.  Appointment has been made as directed by Dr. Patricio Cooley   Date Time Provider Promise Bond   9/13/2023  7:10 AM Terrance Kanner, MD [address] [facility] OF THE [facility]   9/15/2023  7:45 AM [facility] LAB1 [address] [place] [address] CHEMO EMO   9/15/2023  9:30 AM Talha Child MD [address] [place] [address] HEM ONC EMO   9/15/2023 10:30 AM [facility] INFRN 3 [facility] CHEMO EMO   10/6/2023  8:00 AM [facility] LAB1 [facility] CHEMO EMO   10/6/2023  9:00 AM PANTERA Kauffman [address] [place] [address] HEM ONC EMO   10/6/2023  9:30 AM [facility] INFRN 3 [facility] CHEMO EMO   10/23/2023 11:00 AM Talha Child MD [address] [place] [address] HEM ONC EMO

## 2023-09-13 ENCOUNTER — OFFICE VISIT (OUTPATIENT)
Dept: OTOLARYNGOLOGY | Facility: CLINIC | Age: 58
End: 2023-09-13

## 2023-09-13 VITALS — BODY MASS INDEX: 32 KG/M2 | WEIGHT: 212 LBS

## 2023-09-13 DIAGNOSIS — K11.5 SIALOLITH: Primary | ICD-10-CM

## 2023-09-13 DIAGNOSIS — K11.20 SIALADENITIS: ICD-10-CM

## 2023-09-13 PROCEDURE — 99245 OFF/OP CONSLTJ NEW/EST HI 55: CPT | Performed by: STUDENT IN AN ORGANIZED HEALTH CARE EDUCATION/TRAINING PROGRAM

## 2023-09-14 ENCOUNTER — TELEPHONE (OUTPATIENT)
Dept: OTOLARYNGOLOGY | Facility: CLINIC | Age: 58
End: 2023-09-14

## 2023-09-14 DIAGNOSIS — K11.5 SIALOLITHIASIS: Primary | ICD-10-CM

## 2023-09-15 ENCOUNTER — OFFICE VISIT (OUTPATIENT)
Dept: HEMATOLOGY/ONCOLOGY | Facility: HOSPITAL | Age: 58
End: 2023-09-15
Attending: INTERNAL MEDICINE
Payer: MEDICAID

## 2023-09-15 VITALS
WEIGHT: 215.63 LBS | BODY MASS INDEX: 32.68 KG/M2 | DIASTOLIC BLOOD PRESSURE: 78 MMHG | HEIGHT: 68 IN | TEMPERATURE: 98 F | SYSTOLIC BLOOD PRESSURE: 128 MMHG | HEART RATE: 56 BPM | OXYGEN SATURATION: 99 % | RESPIRATION RATE: 16 BRPM

## 2023-09-15 DIAGNOSIS — C43.61 MALIGNANT MELANOMA OF RIGHT UPPER EXTREMITY INCLUDING SHOULDER (HCC): Primary | ICD-10-CM

## 2023-09-15 DIAGNOSIS — Z51.12 ENCOUNTER FOR ANTINEOPLASTIC IMMUNOTHERAPY: ICD-10-CM

## 2023-09-15 DIAGNOSIS — C78.89 MELANOMA METASTATIC TO DIGESTIVE ORGAN (HCC): ICD-10-CM

## 2023-09-15 LAB
ALBUMIN SERPL-MCNC: 3.7 G/DL (ref 3.4–5)
ALBUMIN/GLOB SERPL: 1 {RATIO} (ref 1–2)
ALP LIVER SERPL-CCNC: 90 U/L
ALT SERPL-CCNC: 27 U/L
ANION GAP SERPL CALC-SCNC: 3 MMOL/L (ref 0–18)
AST SERPL-CCNC: 12 U/L (ref 15–37)
BASOPHILS # BLD AUTO: 0.11 X10(3) UL (ref 0–0.2)
BASOPHILS NFR BLD AUTO: 1.7 %
BILIRUB SERPL-MCNC: 0.3 MG/DL (ref 0.1–2)
BUN BLD-MCNC: 18 MG/DL (ref 7–18)
BUN/CREAT SERPL: 20 (ref 10–20)
CALCIUM BLD-MCNC: 8.8 MG/DL (ref 8.5–10.1)
CHLORIDE SERPL-SCNC: 108 MMOL/L (ref 98–112)
CO2 SERPL-SCNC: 28 MMOL/L (ref 21–32)
CREAT BLD-MCNC: 0.9 MG/DL
DEPRECATED RDW RBC AUTO: 73.3 FL (ref 35.1–46.3)
EGFRCR SERPLBLD CKD-EPI 2021: 99 ML/MIN/1.73M2 (ref 60–?)
EOSINOPHIL # BLD AUTO: 0.2 X10(3) UL (ref 0–0.7)
EOSINOPHIL NFR BLD AUTO: 3.1 %
ERYTHROCYTE [DISTWIDTH] IN BLOOD BY AUTOMATED COUNT: 23.8 % (ref 11–15)
GLOBULIN PLAS-MCNC: 3.8 G/DL (ref 2.8–4.4)
GLUCOSE BLD-MCNC: 135 MG/DL (ref 70–99)
HCT VFR BLD AUTO: 43.9 %
HGB BLD-MCNC: 13.4 G/DL
IMM GRANULOCYTES # BLD AUTO: 0.01 X10(3) UL (ref 0–1)
IMM GRANULOCYTES NFR BLD: 0.2 %
LYMPHOCYTES # BLD AUTO: 2.91 X10(3) UL (ref 1–4)
LYMPHOCYTES NFR BLD AUTO: 45.2 %
MCH RBC QN AUTO: 26.9 PG (ref 26–34)
MCHC RBC AUTO-ENTMCNC: 30.5 G/DL (ref 31–37)
MCV RBC AUTO: 88 FL
MONOCYTES # BLD AUTO: 0.68 X10(3) UL (ref 0.1–1)
MONOCYTES NFR BLD AUTO: 10.6 %
NEUTROPHILS # BLD AUTO: 2.53 X10 (3) UL (ref 1.5–7.7)
NEUTROPHILS # BLD AUTO: 2.53 X10(3) UL (ref 1.5–7.7)
NEUTROPHILS NFR BLD AUTO: 39.2 %
OSMOLALITY SERPL CALC.SUM OF ELEC: 292 MOSM/KG (ref 275–295)
PLATELET # BLD AUTO: 272 10(3)UL (ref 150–450)
POTASSIUM SERPL-SCNC: 4.4 MMOL/L (ref 3.5–5.1)
PROT SERPL-MCNC: 7.5 G/DL (ref 6.4–8.2)
RBC # BLD AUTO: 4.99 X10(6)UL
SODIUM SERPL-SCNC: 139 MMOL/L (ref 136–145)
WBC # BLD AUTO: 6.4 X10(3) UL (ref 4–11)

## 2023-09-15 PROCEDURE — 85025 COMPLETE CBC W/AUTO DIFF WBC: CPT

## 2023-09-15 PROCEDURE — 96415 CHEMO IV INFUSION ADDL HR: CPT

## 2023-09-15 PROCEDURE — 96413 CHEMO IV INFUSION 1 HR: CPT

## 2023-09-15 PROCEDURE — 99215 OFFICE O/P EST HI 40 MIN: CPT | Performed by: INTERNAL MEDICINE

## 2023-09-15 PROCEDURE — 80053 COMPREHEN METABOLIC PANEL: CPT

## 2023-09-15 PROCEDURE — 96417 CHEMO IV INFUS EACH ADDL SEQ: CPT

## 2023-09-27 ENCOUNTER — TELEPHONE (OUTPATIENT)
Dept: OTOLARYNGOLOGY | Facility: CLINIC | Age: 58
End: 2023-09-27

## 2023-10-01 ENCOUNTER — ANESTHESIA EVENT (OUTPATIENT)
Dept: SURGERY | Facility: HOSPITAL | Age: 58
End: 2023-10-01
Payer: MEDICAID

## 2023-10-02 ENCOUNTER — HOSPITAL ENCOUNTER (OUTPATIENT)
Facility: HOSPITAL | Age: 58
Setting detail: HOSPITAL OUTPATIENT SURGERY
Discharge: HOME | End: 2023-10-02
Attending: STUDENT IN AN ORGANIZED HEALTH CARE EDUCATION/TRAINING PROGRAM | Admitting: STUDENT IN AN ORGANIZED HEALTH CARE EDUCATION/TRAINING PROGRAM
Payer: MEDICAID

## 2023-10-02 ENCOUNTER — ANESTHESIA (OUTPATIENT)
Dept: SURGERY | Facility: HOSPITAL | Age: 58
End: 2023-10-02
Payer: MEDICAID

## 2023-10-02 VITALS
TEMPERATURE: 97 F | HEART RATE: 67 BPM | RESPIRATION RATE: 16 BRPM | OXYGEN SATURATION: 96 % | BODY MASS INDEX: 32.43 KG/M2 | SYSTOLIC BLOOD PRESSURE: 131 MMHG | WEIGHT: 214 LBS | HEIGHT: 68 IN | DIASTOLIC BLOOD PRESSURE: 88 MMHG

## 2023-10-02 DIAGNOSIS — K11.5 SIALOLITHIASIS: ICD-10-CM

## 2023-10-02 PROCEDURE — 0CBH0ZZ EXCISION OF LEFT SUBMAXILLARY GLAND, OPEN APPROACH: ICD-10-PCS | Performed by: STUDENT IN AN ORGANIZED HEALTH CARE EDUCATION/TRAINING PROGRAM

## 2023-10-02 PROCEDURE — 88305 TISSUE EXAM BY PATHOLOGIST: CPT | Performed by: STUDENT IN AN ORGANIZED HEALTH CARE EDUCATION/TRAINING PROGRAM

## 2023-10-02 PROCEDURE — 88307 TISSUE EXAM BY PATHOLOGIST: CPT | Performed by: STUDENT IN AN ORGANIZED HEALTH CARE EDUCATION/TRAINING PROGRAM

## 2023-10-02 DEVICE — POWDER HEMSTAT 3GM OXIDIZED REGENERATED CELOS: Type: IMPLANTABLE DEVICE | Status: FUNCTIONAL

## 2023-10-02 RX ORDER — HYDROCODONE BITARTRATE AND ACETAMINOPHEN 5; 325 MG/1; MG/1
1 TABLET ORAL EVERY 6 HOURS PRN
Qty: 12 TABLET | Refills: 0 | Status: SHIPPED | OUTPATIENT
Start: 2023-10-02 | End: 2023-10-05

## 2023-10-02 RX ORDER — LIDOCAINE HYDROCHLORIDE AND EPINEPHRINE 10; 10 MG/ML; UG/ML
INJECTION, SOLUTION INFILTRATION; PERINEURAL AS NEEDED
Status: DISCONTINUED | OUTPATIENT
Start: 2023-10-02 | End: 2023-10-02 | Stop reason: HOSPADM

## 2023-10-02 RX ORDER — MORPHINE SULFATE 4 MG/ML
4 INJECTION, SOLUTION INTRAMUSCULAR; INTRAVENOUS EVERY 10 MIN PRN
Status: DISCONTINUED | OUTPATIENT
Start: 2023-10-02 | End: 2023-10-02

## 2023-10-02 RX ORDER — ACETAMINOPHEN 500 MG
1000 TABLET ORAL ONCE
Status: COMPLETED | OUTPATIENT
Start: 2023-10-02 | End: 2023-10-02

## 2023-10-02 RX ORDER — METOCLOPRAMIDE 10 MG/1
10 TABLET ORAL ONCE
Status: COMPLETED | OUTPATIENT
Start: 2023-10-02 | End: 2023-10-02

## 2023-10-02 RX ORDER — HYDROMORPHONE HYDROCHLORIDE 1 MG/ML
0.2 INJECTION, SOLUTION INTRAMUSCULAR; INTRAVENOUS; SUBCUTANEOUS EVERY 5 MIN PRN
Status: DISCONTINUED | OUTPATIENT
Start: 2023-10-02 | End: 2023-10-02

## 2023-10-02 RX ORDER — NALOXONE HYDROCHLORIDE 0.4 MG/ML
0.08 INJECTION, SOLUTION INTRAMUSCULAR; INTRAVENOUS; SUBCUTANEOUS AS NEEDED
Status: DISCONTINUED | OUTPATIENT
Start: 2023-10-02 | End: 2023-10-02

## 2023-10-02 RX ORDER — AMOXICILLIN AND CLAVULANATE POTASSIUM 875; 125 MG/1; MG/1
1 TABLET, FILM COATED ORAL 2 TIMES DAILY
Qty: 14 TABLET | Refills: 0 | Status: SHIPPED | OUTPATIENT
Start: 2023-10-02 | End: 2023-10-09

## 2023-10-02 RX ORDER — EPHEDRINE SULFATE 50 MG/ML
INJECTION, SOLUTION INTRAVENOUS AS NEEDED
Status: DISCONTINUED | OUTPATIENT
Start: 2023-10-02 | End: 2023-10-02 | Stop reason: SURG

## 2023-10-02 RX ORDER — LIDOCAINE HYDROCHLORIDE 10 MG/ML
INJECTION, SOLUTION EPIDURAL; INFILTRATION; INTRACAUDAL; PERINEURAL AS NEEDED
Status: DISCONTINUED | OUTPATIENT
Start: 2023-10-02 | End: 2023-10-02 | Stop reason: SURG

## 2023-10-02 RX ORDER — PROCHLORPERAZINE EDISYLATE 5 MG/ML
5 INJECTION INTRAMUSCULAR; INTRAVENOUS EVERY 8 HOURS PRN
Status: DISCONTINUED | OUTPATIENT
Start: 2023-10-02 | End: 2023-10-02

## 2023-10-02 RX ORDER — ROCURONIUM BROMIDE 10 MG/ML
INJECTION, SOLUTION INTRAVENOUS AS NEEDED
Status: DISCONTINUED | OUTPATIENT
Start: 2023-10-02 | End: 2023-10-02 | Stop reason: SURG

## 2023-10-02 RX ORDER — FAMOTIDINE 20 MG/1
20 TABLET, FILM COATED ORAL ONCE
Status: COMPLETED | OUTPATIENT
Start: 2023-10-02 | End: 2023-10-02

## 2023-10-02 RX ORDER — DEXAMETHASONE SODIUM PHOSPHATE 4 MG/ML
VIAL (ML) INJECTION AS NEEDED
Status: DISCONTINUED | OUTPATIENT
Start: 2023-10-02 | End: 2023-10-02 | Stop reason: SURG

## 2023-10-02 RX ORDER — CEFAZOLIN SODIUM/WATER 2 G/20 ML
SYRINGE (ML) INTRAVENOUS AS NEEDED
Status: DISCONTINUED | OUTPATIENT
Start: 2023-10-02 | End: 2023-10-02 | Stop reason: SURG

## 2023-10-02 RX ORDER — SODIUM CHLORIDE, SODIUM LACTATE, POTASSIUM CHLORIDE, CALCIUM CHLORIDE 600; 310; 30; 20 MG/100ML; MG/100ML; MG/100ML; MG/100ML
INJECTION, SOLUTION INTRAVENOUS CONTINUOUS
Status: DISCONTINUED | OUTPATIENT
Start: 2023-10-02 | End: 2023-10-02

## 2023-10-02 RX ORDER — MIDAZOLAM HYDROCHLORIDE 1 MG/ML
INJECTION INTRAMUSCULAR; INTRAVENOUS AS NEEDED
Status: DISCONTINUED | OUTPATIENT
Start: 2023-10-02 | End: 2023-10-02 | Stop reason: SURG

## 2023-10-02 RX ORDER — ONDANSETRON 2 MG/ML
4 INJECTION INTRAMUSCULAR; INTRAVENOUS EVERY 6 HOURS PRN
Status: DISCONTINUED | OUTPATIENT
Start: 2023-10-02 | End: 2023-10-02

## 2023-10-02 RX ORDER — HYDROMORPHONE HYDROCHLORIDE 1 MG/ML
0.4 INJECTION, SOLUTION INTRAMUSCULAR; INTRAVENOUS; SUBCUTANEOUS EVERY 5 MIN PRN
Status: DISCONTINUED | OUTPATIENT
Start: 2023-10-02 | End: 2023-10-02

## 2023-10-02 RX ORDER — ONDANSETRON 2 MG/ML
INJECTION INTRAMUSCULAR; INTRAVENOUS AS NEEDED
Status: DISCONTINUED | OUTPATIENT
Start: 2023-10-02 | End: 2023-10-02 | Stop reason: SURG

## 2023-10-02 RX ORDER — MORPHINE SULFATE 4 MG/ML
2 INJECTION, SOLUTION INTRAMUSCULAR; INTRAVENOUS EVERY 10 MIN PRN
Status: DISCONTINUED | OUTPATIENT
Start: 2023-10-02 | End: 2023-10-02

## 2023-10-02 RX ADMIN — ONDANSETRON 4 MG: 2 INJECTION INTRAMUSCULAR; INTRAVENOUS at 08:40:00

## 2023-10-02 RX ADMIN — SODIUM CHLORIDE, SODIUM LACTATE, POTASSIUM CHLORIDE, CALCIUM CHLORIDE: 600; 310; 30; 20 INJECTION, SOLUTION INTRAVENOUS at 08:40:00

## 2023-10-02 RX ADMIN — DEXAMETHASONE SODIUM PHOSPHATE 8 MG: 4 MG/ML VIAL (ML) INJECTION at 07:36:00

## 2023-10-02 RX ADMIN — ROCURONIUM BROMIDE 10 MG: 10 INJECTION, SOLUTION INTRAVENOUS at 07:34:00

## 2023-10-02 RX ADMIN — EPHEDRINE SULFATE 10 MG: 50 INJECTION, SOLUTION INTRAVENOUS at 07:50:00

## 2023-10-02 RX ADMIN — SODIUM CHLORIDE, SODIUM LACTATE, POTASSIUM CHLORIDE, CALCIUM CHLORIDE: 600; 310; 30; 20 INJECTION, SOLUTION INTRAVENOUS at 08:05:00

## 2023-10-02 RX ADMIN — CEFAZOLIN SODIUM/WATER 2 G: 2 G/20 ML SYRINGE (ML) INTRAVENOUS at 07:39:00

## 2023-10-02 RX ADMIN — LIDOCAINE HYDROCHLORIDE 50 MG: 10 INJECTION, SOLUTION EPIDURAL; INFILTRATION; INTRACAUDAL; PERINEURAL at 07:34:00

## 2023-10-02 RX ADMIN — SODIUM CHLORIDE, SODIUM LACTATE, POTASSIUM CHLORIDE, CALCIUM CHLORIDE: 600; 310; 30; 20 INJECTION, SOLUTION INTRAVENOUS at 07:43:00

## 2023-10-02 RX ADMIN — MIDAZOLAM HYDROCHLORIDE 2 MG: 1 INJECTION INTRAMUSCULAR; INTRAVENOUS at 07:33:00

## 2023-10-02 NOTE — ANESTHESIA PROCEDURE NOTES
Airway  Date/Time: 10/2/2023 7:37 AM  Urgency: elective    Airway not difficult    General Information and Staff    Patient location during procedure: OR  Anesthesiologist: Zurdo Maurice MD  Performed: anesthesiologist   Performed by: Zurdo Maurice MD  Authorized by: Zurdo Maurice MD      Indications and Patient Condition  Indications for airway management: anesthesia  Spontaneous Ventilation: absent  Sedation level: deep  Preoxygenated: yes  Patient position: sniffing  Mask difficulty assessment: 0 - not attempted    Final Airway Details  Final airway type: endotracheal airway      Successful airway: ETT  Cuffed: yes   Successful intubation technique: direct laryngoscopy  Facilitating devices/methods: intubating stylet and cricoid pressure  Endotracheal tube insertion site: oral  Blade size: #4  ETT size (mm): 7.5    Cormack-Lehane Classification: grade IIB - view of arytenoids or posterior of glottis only  Placement verified by: capnometry   Cuff volume (mL): 7  Measured from: lips  ETT to lips (cm): 23  Number of attempts at approach: 1  Number of other approaches attempted: 0

## 2023-10-02 NOTE — BRIEF OP NOTE
Pre-Operative Diagnosis: Sialolithiasis [K11.5]     Post-Operative Diagnosis: Sialolithiasis [K11.5]      Procedure Performed:   Left submandibular gland excision versus intraoral stone removal with SSEP    Surgeon(s) and Role:     Renee Machado MD - Primary     * Candice Partida MD - Assisting Surgeon    Assistant(s):        Surgical Findings: Scarred in left SMG. Large sialolith in proximal duct. Duct was truncated distal to this and gland and stone excised. Digastric identified, hypoglossal preserved. No stimulation from marginal mandibular nerve throughout case. Lingual nerve identified and preserved.       Specimen: left SMG     Estimated Blood Loss: 25cc    Dictation Number:      Keli Blood MD  10/2/2023  8:49 AM

## 2023-10-02 NOTE — OPERATIVE REPORT
DATE OF SURGERY: 10/2/23  PREOPERATIVE DIAGNOSIS:   Left submandibular sialolith  Left sialadenitis, chronic   POSTOPERATIVE DIAGNOSIS: Same. OPERATIVE PROCEDURE:    Excision of left submandibular gland  Excision of left submandibular sialolith, transcervical approach    SURGEON: Lorna Verde M.D.  (Otolaryngology)  Assistant: Dr. Suzanne Heck MD    ANESTHESIA:  GENERAL. Findings: Scarred in left SMG. Large sialolith in proximal duct. Duct was truncated distal to this and gland and stone excised. Posterior digastric identified, hypoglossal preserved. No stimulation from marginal mandibular nerve throughout case. Lingual nerve identified and preserved. PROCEDURE: Patient was taken to the operating room and placed supine on the operating table. They underwent an uneventful intubation. Neuro monitoring was hooked up and seem to be functioning properly by the neuro monitoring attendant. The patient was positioned with a shoulder roll and incision was marked out 2 fingerbreadths below the angle of the left mandible. Injection was performed with local.  The patient was prepped and draped in sterile fashion. Timeout was performed all parties were in agreement. Incision was made with a 15 blade through the skin. This was carried deeper down through the dermis. The platysma was identified and divided along its length. Dissection then proceeded through the fascia until the capsule of the submandibular gland was identified. We followed the capsule of the submandibular gland inferiorly to find the inferior border of the submandibular gland. We then identified the adjacent digastric muscle. We then carried this dissection more anteriorly to free up a large portion of the submandibular gland which was clamped for retraction purposes. We pulled up the fascia on top the gland superiorly as to protect the marginal mandibular nerve.  We then followed our plane posteriorly preserving the facial vein as we encounter this.  The gland was quite scarred in at its superior and anterior aspects. We meticulously divided the fibrous tissue staying close to the gland capsule around the superior and posterior aspect of the gland. We continued to pull the gland free as we moved anteriorly and encountered the mylohyoid muscle. This was retracted anteriorly. We identified and preserve the lingual nerve. We retracted the mylohyoid muscle anteriorly and identified the submandibular duct and in lying stone. We follow this more distally until we were beyond the stone. We then truncated the duct and tied this off distally beyond the stone. We then removed the remaining attachments of the gland and the duct to the neck. We sent this for specimen. We were cognizant of the hypoglossal nerve and we did not enter deep into its plane to violate this as we stayed superficial to the digastric muscle  We then achieved hemostasis. Hemostatic powder was instilled in the wound. The platysma was closed with 3-0 chromic suture. The skin was then closed with deep dermal 3-0 chromic suture. Dermabond was then applied on the skin. The area was cleaned and dried. The patient was then handed over to anesthesia. The nerve monitoring was disconnected. They were extubated in stable fashion. The patient was breathing stable and transported to PACU in stable condition.    Estimated blood loss: 25cc    Implants or drains: None  Specimen: Left submandibular gland    Urine output: Per anesthesia

## 2023-10-03 ENCOUNTER — TELEPHONE (OUTPATIENT)
Dept: OTOLARYNGOLOGY | Facility: CLINIC | Age: 58
End: 2023-10-03

## 2023-10-03 NOTE — TELEPHONE ENCOUNTER
Post op day #1 left submandibular gland excision vs. intraoral stone removal with SSEP    Attempted to call patient and LMTCB using [place] [facility] [phone] Patient called back with [place] interpretor, Keara Escobar and Patient is doing well, is eating and drinking well, has no pain, no drainage, is taking augmentin as directed, has not needed to take norco as does not have pain at this time. Reinforced post op instructions as follows, patient may shower with back to water, don't pick or touch dermabond, to call office if any drainage, redness, edema to area or fever over 101.0 F. To see Dr. Roger Cunningham in one week. Post op appointment made.   Future Appointments   Date Time Provider Promise Bond   10/6/2023  8:00 AM EM [facility] LAB1 [facility] CHEMO EMO   10/6/2023  9:00 AM PANTERA Lai [facility] HEM ONC EMO   10/6/2023  9:30 AM EM CC INFRN 3 [facility] CHEMO EMO   10/9/2023  5:00 PM Shay Hernandez MD [address] [facility] OF THE [facility]   10/23/2023 11:00 AM Crystal Haynes  [facility] HEM ONC EMO

## 2023-10-06 ENCOUNTER — OFFICE VISIT (OUTPATIENT)
Dept: HEMATOLOGY/ONCOLOGY | Facility: HOSPITAL | Age: 58
End: 2023-10-06
Attending: INTERNAL MEDICINE
Payer: MEDICAID

## 2023-10-06 ENCOUNTER — HOSPITAL ENCOUNTER (OUTPATIENT)
Dept: ULTRASOUND IMAGING | Facility: HOSPITAL | Age: 58
Discharge: HOME | End: 2023-10-06
Attending: INTERNAL MEDICINE
Payer: MEDICAID

## 2023-10-06 ENCOUNTER — OFFICE VISIT (OUTPATIENT)
Dept: HEMATOLOGY/ONCOLOGY | Facility: HOSPITAL | Age: 58
End: 2023-10-06
Attending: NURSE PRACTITIONER
Payer: MEDICAID

## 2023-10-06 VITALS
OXYGEN SATURATION: 99 % | SYSTOLIC BLOOD PRESSURE: 134 MMHG | DIASTOLIC BLOOD PRESSURE: 82 MMHG | WEIGHT: 213 LBS | RESPIRATION RATE: 16 BRPM | BODY MASS INDEX: 32.28 KG/M2 | HEIGHT: 68 IN | TEMPERATURE: 98 F | HEART RATE: 59 BPM

## 2023-10-06 DIAGNOSIS — C43.61 MALIGNANT MELANOMA OF RIGHT UPPER EXTREMITY INCLUDING SHOULDER (HCC): Primary | ICD-10-CM

## 2023-10-06 DIAGNOSIS — Z51.12 ENCOUNTER FOR ANTINEOPLASTIC IMMUNOTHERAPY: ICD-10-CM

## 2023-10-06 DIAGNOSIS — I83.811 VARICOSE VEINS OF RIGHT LOWER EXTREMITY WITH PAIN: ICD-10-CM

## 2023-10-06 DIAGNOSIS — C78.89 MELANOMA METASTATIC TO DIGESTIVE ORGAN (HCC): ICD-10-CM

## 2023-10-06 LAB
ALBUMIN SERPL-MCNC: 3.7 G/DL (ref 3.4–5)
ALBUMIN/GLOB SERPL: 0.8 {RATIO} (ref 1–2)
ALP LIVER SERPL-CCNC: 121 U/L
ALT SERPL-CCNC: 26 U/L
ANION GAP SERPL CALC-SCNC: 7 MMOL/L (ref 0–18)
AST SERPL-CCNC: 11 U/L (ref 15–37)
BASOPHILS # BLD AUTO: 0.09 X10(3) UL (ref 0–0.2)
BASOPHILS NFR BLD AUTO: 0.9 %
BILIRUB SERPL-MCNC: 0.4 MG/DL (ref 0.1–2)
BUN BLD-MCNC: 19 MG/DL (ref 7–18)
BUN/CREAT SERPL: 20.7 (ref 10–20)
CALCIUM BLD-MCNC: 9.1 MG/DL (ref 8.5–10.1)
CHLORIDE SERPL-SCNC: 106 MMOL/L (ref 98–112)
CO2 SERPL-SCNC: 26 MMOL/L (ref 21–32)
CREAT BLD-MCNC: 0.92 MG/DL
DEPRECATED RDW RBC AUTO: 58.6 FL (ref 35.1–46.3)
EGFRCR SERPLBLD CKD-EPI 2021: 96 ML/MIN/1.73M2 (ref 60–?)
EOSINOPHIL # BLD AUTO: 0.12 X10(3) UL (ref 0–0.7)
EOSINOPHIL NFR BLD AUTO: 1.2 %
ERYTHROCYTE [DISTWIDTH] IN BLOOD BY AUTOMATED COUNT: 19.4 % (ref 11–15)
GLOBULIN PLAS-MCNC: 4.5 G/DL (ref 2.8–4.4)
GLUCOSE BLD-MCNC: 137 MG/DL (ref 70–99)
HCT VFR BLD AUTO: 45.3 %
HGB BLD-MCNC: 15.1 G/DL
IMM GRANULOCYTES # BLD AUTO: 0.03 X10(3) UL (ref 0–1)
IMM GRANULOCYTES NFR BLD: 0.3 %
LYMPHOCYTES # BLD AUTO: 2.99 X10(3) UL (ref 1–4)
LYMPHOCYTES NFR BLD AUTO: 28.8 %
MCH RBC QN AUTO: 27.9 PG (ref 26–34)
MCHC RBC AUTO-ENTMCNC: 33.3 G/DL (ref 31–37)
MCV RBC AUTO: 83.7 FL
MONOCYTES # BLD AUTO: 1.04 X10(3) UL (ref 0.1–1)
MONOCYTES NFR BLD AUTO: 10 %
NEUTROPHILS # BLD AUTO: 6.11 X10 (3) UL (ref 1.5–7.7)
NEUTROPHILS # BLD AUTO: 6.11 X10(3) UL (ref 1.5–7.7)
NEUTROPHILS NFR BLD AUTO: 58.8 %
OSMOLALITY SERPL CALC.SUM OF ELEC: 292 MOSM/KG (ref 275–295)
PLATELET # BLD AUTO: 304 10(3)UL (ref 150–450)
POTASSIUM SERPL-SCNC: 4.1 MMOL/L (ref 3.5–5.1)
PROT SERPL-MCNC: 8.2 G/DL (ref 6.4–8.2)
RBC # BLD AUTO: 5.41 X10(6)UL
SODIUM SERPL-SCNC: 139 MMOL/L (ref 136–145)
T4 FREE SERPL-MCNC: 1.2 NG/DL (ref 0.8–1.7)
TSI SER-ACNC: 2.18 MIU/ML (ref 0.36–3.74)
WBC # BLD AUTO: 10.4 X10(3) UL (ref 4–11)

## 2023-10-06 PROCEDURE — 84443 ASSAY THYROID STIM HORMONE: CPT

## 2023-10-06 PROCEDURE — 80053 COMPREHEN METABOLIC PANEL: CPT

## 2023-10-06 PROCEDURE — 93971 EXTREMITY STUDY: CPT | Performed by: INTERNAL MEDICINE

## 2023-10-06 PROCEDURE — 96413 CHEMO IV INFUSION 1 HR: CPT

## 2023-10-06 PROCEDURE — 85025 COMPLETE CBC W/AUTO DIFF WBC: CPT

## 2023-10-06 PROCEDURE — 96417 CHEMO IV INFUS EACH ADDL SEQ: CPT

## 2023-10-06 PROCEDURE — 96415 CHEMO IV INFUSION ADDL HR: CPT

## 2023-10-06 PROCEDURE — 84439 ASSAY OF FREE THYROXINE: CPT

## 2023-10-06 PROCEDURE — 99215 OFFICE O/P EST HI 40 MIN: CPT | Performed by: INTERNAL MEDICINE

## 2023-10-06 NOTE — PROGRESS NOTES
Pt here for C4D opdivo + yervoy. Arrives Ambulating independently, accompanied by Self       Having leg pain today-possible doppler exam of after tx-waiting for scheduling of. Oral medications included in this regimen:  no    Patient confirms comprehension of cancer treatment schedule:  yes    Pregnancy screening:  Not applicable    Modifications in dose or schedule:  No. Returns in 3 weeks for opdivo. Medications appearance and physical integrity checked by this RN as well as the pump setting per current protocol     Frequency of blood return and site check throughout administration: Prior to administration, Prior to each drug, and At completion of therapy     Infusion/treatment outcome:  patient tolerated treatment without incident    Education Record    Learner:  Patient  Barriers / Limitations:  None  Method:  Discussion  Education / instructions given:  to return in 3 weeks and to have PET scan prior.   Outcome:  Shows understanding    Discharged Other stable from infusion , Ambulating independently, accompanied by:Self    Patient/family verbalized understanding of future appointments: by [facility]

## 2023-10-09 ENCOUNTER — OFFICE VISIT (OUTPATIENT)
Dept: OTOLARYNGOLOGY | Facility: CLINIC | Age: 58
End: 2023-10-09

## 2023-10-09 VITALS — HEIGHT: 68 IN | BODY MASS INDEX: 32.28 KG/M2 | WEIGHT: 213 LBS

## 2023-10-09 DIAGNOSIS — K11.5 SIALOLITH: Primary | ICD-10-CM

## 2023-10-09 PROCEDURE — 3008F BODY MASS INDEX DOCD: CPT | Performed by: STUDENT IN AN ORGANIZED HEALTH CARE EDUCATION/TRAINING PROGRAM

## 2023-10-09 PROCEDURE — 99213 OFFICE O/P EST LOW 20 MIN: CPT | Performed by: STUDENT IN AN ORGANIZED HEALTH CARE EDUCATION/TRAINING PROGRAM

## 2023-10-18 ENCOUNTER — HOSPITAL ENCOUNTER (OUTPATIENT)
Dept: NUCLEAR MEDICINE | Facility: HOSPITAL | Age: 58
Discharge: HOME | End: 2023-10-18
Attending: INTERNAL MEDICINE
Payer: MEDICAID

## 2023-10-18 DIAGNOSIS — C78.89 MELANOMA METASTATIC TO DIGESTIVE ORGAN (HCC): ICD-10-CM

## 2023-10-18 DIAGNOSIS — C43.61 MALIGNANT MELANOMA OF RIGHT UPPER EXTREMITY INCLUDING SHOULDER (HCC): ICD-10-CM

## 2023-10-18 LAB — GLUCOSE BLDC GLUCOMTR-MCNC: 118 MG/DL (ref 70–99)

## 2023-10-18 PROCEDURE — 82962 GLUCOSE BLOOD TEST: CPT

## 2023-10-18 PROCEDURE — 78816 PET IMAGE W/CT FULL BODY: CPT | Performed by: INTERNAL MEDICINE

## 2023-10-23 ENCOUNTER — OFFICE VISIT (OUTPATIENT)
Dept: HEMATOLOGY/ONCOLOGY | Facility: HOSPITAL | Age: 58
End: 2023-10-23
Attending: INTERNAL MEDICINE
Payer: MEDICAID

## 2023-10-23 ENCOUNTER — TELEPHONE (OUTPATIENT)
Dept: HEMATOLOGY/ONCOLOGY | Facility: HOSPITAL | Age: 58
End: 2023-10-23

## 2023-10-23 VITALS
HEIGHT: 68 IN | BODY MASS INDEX: 32.28 KG/M2 | RESPIRATION RATE: 16 BRPM | WEIGHT: 213 LBS | TEMPERATURE: 98 F | OXYGEN SATURATION: 97 % | SYSTOLIC BLOOD PRESSURE: 132 MMHG | DIASTOLIC BLOOD PRESSURE: 75 MMHG | HEART RATE: 59 BPM

## 2023-10-23 DIAGNOSIS — Z51.12 ENCOUNTER FOR ANTINEOPLASTIC IMMUNOTHERAPY: ICD-10-CM

## 2023-10-23 DIAGNOSIS — C43.61 MALIGNANT MELANOMA OF RIGHT UPPER EXTREMITY INCLUDING SHOULDER (HCC): Primary | ICD-10-CM

## 2023-10-23 DIAGNOSIS — C78.89 MELANOMA METASTATIC TO DIGESTIVE ORGAN (HCC): ICD-10-CM

## 2023-10-23 PROCEDURE — 99215 OFFICE O/P EST HI 40 MIN: CPT | Performed by: INTERNAL MEDICINE

## 2023-10-23 NOTE — TELEPHONE ENCOUNTER
Sancho called and notified patient due for tx this Friday. Informed patient thought was going to have tx at Dr. Concepcion Tsai office [phone] on Friday. Dr. Brenda Sanders office called and patient has no follow up appointments. Last visit was in April. Discussed with sancho patient should keep appointment for tx here at [facility] on Friday and schedule a f/u appointment with Dr. Concepcion Tsai as soon as possible to continue tx plan and stay on schedule. Informed will fax up to date progress note to MD office. Sancho verbalizes understanding and will call back if has further questions.
no

## 2023-10-27 ENCOUNTER — NURSE ONLY (OUTPATIENT)
Dept: HEMATOLOGY/ONCOLOGY | Facility: HOSPITAL | Age: 58
End: 2023-10-27
Attending: INTERNAL MEDICINE
Payer: MEDICAID

## 2023-10-27 ENCOUNTER — APPOINTMENT (OUTPATIENT)
Dept: HEMATOLOGY/ONCOLOGY | Facility: HOSPITAL | Age: 58
End: 2023-10-27
Attending: NURSE PRACTITIONER
Payer: MEDICAID

## 2023-10-27 VITALS
RESPIRATION RATE: 16 BRPM | OXYGEN SATURATION: 97 % | HEART RATE: 59 BPM | SYSTOLIC BLOOD PRESSURE: 104 MMHG | DIASTOLIC BLOOD PRESSURE: 69 MMHG | TEMPERATURE: 98 F | WEIGHT: 212.38 LBS | BODY MASS INDEX: 32 KG/M2

## 2023-10-27 DIAGNOSIS — C43.61 MALIGNANT MELANOMA OF RIGHT UPPER EXTREMITY INCLUDING SHOULDER (HCC): Primary | ICD-10-CM

## 2023-10-27 DIAGNOSIS — D50.0 IRON DEFICIENCY ANEMIA DUE TO CHRONIC BLOOD LOSS: ICD-10-CM

## 2023-10-27 LAB
ALBUMIN SERPL-MCNC: 3.6 G/DL (ref 3.4–5)
ALBUMIN/GLOB SERPL: 0.9 {RATIO} (ref 1–2)
ALP LIVER SERPL-CCNC: 127 U/L
ALT SERPL-CCNC: 26 U/L
ANION GAP SERPL CALC-SCNC: 6 MMOL/L (ref 0–18)
AST SERPL-CCNC: 13 U/L (ref 15–37)
BASOPHILS # BLD AUTO: 0.07 X10(3) UL (ref 0–0.2)
BASOPHILS NFR BLD AUTO: 1.1 %
BILIRUB SERPL-MCNC: 0.4 MG/DL (ref 0.1–2)
BUN BLD-MCNC: 13 MG/DL (ref 7–18)
BUN/CREAT SERPL: 15.9 (ref 10–20)
CALCIUM BLD-MCNC: 8.9 MG/DL (ref 8.5–10.1)
CHLORIDE SERPL-SCNC: 109 MMOL/L (ref 98–112)
CO2 SERPL-SCNC: 24 MMOL/L (ref 21–32)
CREAT BLD-MCNC: 0.82 MG/DL
DEPRECATED HBV CORE AB SER IA-ACNC: 17.4 NG/ML
DEPRECATED RDW RBC AUTO: 57.1 FL (ref 35.1–46.3)
EGFRCR SERPLBLD CKD-EPI 2021: 102 ML/MIN/1.73M2 (ref 60–?)
EOSINOPHIL # BLD AUTO: 0.1 X10(3) UL (ref 0–0.7)
EOSINOPHIL NFR BLD AUTO: 1.6 %
ERYTHROCYTE [DISTWIDTH] IN BLOOD BY AUTOMATED COUNT: 18.5 % (ref 11–15)
GLOBULIN PLAS-MCNC: 4.2 G/DL (ref 2.8–4.4)
GLUCOSE BLD-MCNC: 100 MG/DL (ref 70–99)
HCT VFR BLD AUTO: 45.1 %
HGB BLD-MCNC: 15 G/DL
IMM GRANULOCYTES # BLD AUTO: 0.01 X10(3) UL (ref 0–1)
IMM GRANULOCYTES NFR BLD: 0.2 %
IRON SATN MFR SERPL: 11 %
IRON SERPL-MCNC: 48 UG/DL
LYMPHOCYTES # BLD AUTO: 2.51 X10(3) UL (ref 1–4)
LYMPHOCYTES NFR BLD AUTO: 39.9 %
MCH RBC QN AUTO: 28 PG (ref 26–34)
MCHC RBC AUTO-ENTMCNC: 33.3 G/DL (ref 31–37)
MCV RBC AUTO: 84.3 FL
MONOCYTES # BLD AUTO: 0.5 X10(3) UL (ref 0.1–1)
MONOCYTES NFR BLD AUTO: 7.9 %
NEUTROPHILS # BLD AUTO: 3.1 X10 (3) UL (ref 1.5–7.7)
NEUTROPHILS # BLD AUTO: 3.1 X10(3) UL (ref 1.5–7.7)
NEUTROPHILS NFR BLD AUTO: 49.3 %
OSMOLALITY SERPL CALC.SUM OF ELEC: 288 MOSM/KG (ref 275–295)
PLATELET # BLD AUTO: 326 10(3)UL (ref 150–450)
POTASSIUM SERPL-SCNC: 4.2 MMOL/L (ref 3.5–5.1)
PROT SERPL-MCNC: 7.8 G/DL (ref 6.4–8.2)
RBC # BLD AUTO: 5.35 X10(6)UL
SODIUM SERPL-SCNC: 139 MMOL/L (ref 136–145)
TIBC SERPL-MCNC: 419 UG/DL (ref 240–450)
TRANSFERRIN SERPL-MCNC: 281 MG/DL (ref 200–360)
WBC # BLD AUTO: 6.3 X10(3) UL (ref 4–11)

## 2023-10-27 PROCEDURE — 96413 CHEMO IV INFUSION 1 HR: CPT

## 2023-10-27 PROCEDURE — 85025 COMPLETE CBC W/AUTO DIFF WBC: CPT

## 2023-10-27 PROCEDURE — 82728 ASSAY OF FERRITIN: CPT

## 2023-10-27 PROCEDURE — 80053 COMPREHEN METABOLIC PANEL: CPT

## 2023-10-27 PROCEDURE — 83540 ASSAY OF IRON: CPT

## 2023-10-27 PROCEDURE — 84466 ASSAY OF TRANSFERRIN: CPT

## 2023-10-27 NOTE — PROGRESS NOTES
Pt here for C5D1 Drug(s)OPDIVO. Arrives Ambulating independently, accompanied by Self     Patient was evaluated today by Treatment Nurse. Oral medications included in this regimen:  no    Patient confirms comprehension of cancer treatment schedule:  yes    Pregnancy screening:  Not applicable    Modifications in dose or schedule:  No    Medications appearance and physical integrity checked by RN: yes. Chemotherapy IV pump settings verified by 2 RNs:  No - immunotherapy.   Frequency of blood return and site check throughout administration: Prior to administration and At completion of therapy     Infusion/treatment outcome:  patient tolerated treatment without incident    Education Record    Learner:  Patient  Barriers / Limitations:  Language  Method:  Discussion  Education / instructions given:  POC  Outcome:  Shows understanding    Discharged Home, Ambulating independently, accompanied by:Self    Patient/family verbalized understanding of future appointments: by printed AVS

## 2023-11-08 NOTE — PROGRESS NOTES
LALI Johnson is a 62year old male here for follow up of No diagnosis found. .    Oncology history:   Extensive outside records from multiple facilities since March 2021 were reviewed. This is a synopsis of those records, details can be found on Care Everywhere. Patient had presented at [address] [place] [address] in [facility] March 2021 due to a mass of the right shoulder. At that time he underwent imaging with an MRI which showed a superficial right upper arm mass which appeared to be veins within the dermis. It measured 3.9 cm x 8.3 cm x 5.9 cm it was felt that there was likely internal process of hemorrhage within the mass. The mass demonstrated significant enhancement and it extended to the involved adjacent thickened areas of the dermis. This mass was abutting the deltoid muscle and mildly effacing the deltoid muscle but not invading it. There is also evidence of 13 mm short axis diameter right axillary lymph node which appeared to have a thickened cortex and was felt to be abnormal.  Dedicated ultrasound of the right axilla was recommended for further evaluation. There were other smaller lymph nodes adjacent to the area. Patient subsequently underwent right axillary lymph node ultrasound guided biopsy, the area contained multiple and large lymph nodes which were measuring between 3.5 and 3.2 cm. There is at least 3 of them. Pathology report showed lymph node tissue showing features consistent with nonspecific reactive lymphoid hyperplasia, there is no evidence of metastatic disease, nor a lymphoproliferative process or malignancy. Patient was then referred to cancer center at [facility] in [facility], and was evaluated by her surgical oncologist Dr. Marissa Lazcano. Per review of his consultation report, the patient had stated he had noted a new (mole) on the right arm 9 months prior to his original presentation and had enlarged over several months and began to bleed.   It was not painful and was not causing any neuromuscular issues. 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 We did discuss causes the results of the MRI above. I was discussed for the patient to proceed with a radical resection of the right upper arm mass with placement of the wound VAC after performing the wide excision of the mass. Surgical resection was performed on 5/26/2021 with en bloc resection of 12 cm with partial resection of the underlying right deltoid muscle. Final wound measured 15 cm in diameter by 4 cm deep, and a wound VAC in place. The pathology report showed that this was consistent with a malignant melanoma that measured 10.5 cm and was focally in the epidermis abutting ulcer with minute focus of possible melanoma in situ. BRAF V600E was positive. As the tumor staging summary, the histologic type was not determined. The Breslow depth could not be determined. There was ulceration present. The mitotic rate was greater than 1/millimeter square. Lymphovascular invasion was present. Tumor infiltrating lymphocytes were present but nonbrisk. There is no tumor regression noted. The peripheral margins, and the deep margins were negative for invasive melanoma. Tumor was 15 mm from the closest peripheral margin and 10 mm from the closest deep margin. Plastic surgery was consulted for wound closure with skin grafting. Patient underwent skin grafting on 6/8/2021. Donor site was from the right thigh. Patient was then seeking oncologic care in [facility] in [address] [place] [address]. The records from that organization are not available on Care Everywhere. The patient states that he was receiving treatment with the local oncologist, Dr. Awais Sheets, with about 8 pills a day he was taking twice a day.   He states that he would pick them up at his doctor's office. He states that he had had a PET scan which is showed positive lymph nodes in his right axilla. On records available in care everywhere on 3/3/2022, there was a CT scan of the chest, abdomen and pelvis with contrast, which was compared to a PET/CT from September 16, 2021. This states that on the prior PET/CT from September 2021 there had been enlarged right axillary lymphadenopathy that had measured 2.6 x 2.1 cm, and on the current study from March 3, 2022 it had resolved. There was no other evidence seen of metastatic disease. There was a 1 cm flash enhancing lesion in the right anterior lobe of the liver which was felt to be a hemangioma. Then on July 25, 2022, it was noted that the patient had iron deficiency anemia. At that time, on the note from the infusion center on 9/8/2022, at which time the patient was receiving Venofer infusions, the only medication listed on his med list was tramadol. He did receive infusion of 300 mg of Venofer x6 doses as an outpatient. Last dose was given on 10/27/2022. On November 7, 2022, the patient received a blood transfusion for hemoglobin of 6.2, transfusion occurred on 11/8/2022. Patient was then admitted on 12/21/2023 and discharged on 12/23/2023 to [facility], due to secondary acute anemia from blood loss secondary to GI bleed. At that time he had presented with several weeks of dizziness, weakness, and left lower extremity swelling. His oncologist recommended he go to the ED for evaluation. He did a left lower extremity Doppler which was negative for DVT. Hemoglobin at the time was 3.2. Patient had complained of several weeks of painless red-maroon or black-colored stools which were not often. He denied any abdominal pain. He denied any nausea or vomiting. Patient received a total of 6 units of packed red cells and underwent an EGD which revealed 5 gastric ulcers with no active bleeding.   He was placed on 3 months of PPI therapy twice daily and was recommended for repeat EGD in 2 months. At that time, the only medication listed was metformin. Patient was then readmitted on 1/4/2023 and discharged on 1/6/2023, due to a recurrent GI bleed. At that time, he had reported an abrupt onset of a sensation of fatigue, diaphoresis and dizziness, and then started having dark stools. When he presented to the ED, with hypotension and hemoglobin of 8.1. There was concern for recurrent GI bleed, however there is no evidence of overt GI bleed. He was transfused 2 units of packed red cells, with hemoglobin up to 9.3 on day of discharge. It was not recommended to repeat endoscopy at the time. He was recommended to continue with the pantoprazole twice daily. He had also had a tagged red cell scan during the first admission which was negative. Patient presented back to his oncologist on 01/11/2023, with a hemoglobin of 6.5. On 1/12/2023 he was then transfused 2 units of packed red cells at the outpatient infusion center at [facility]. Subsequent hemoglobin on 1/19/2023 was 7.1. She did have repeat iron studies on 1/23/2023, with a ferritin of 12, percent iron saturation of 4%, LDH elevated to 364, with a total bilirubin of 0.4. He again was managed with Venofer 300 mg infusions weekly x9 weeks. With last dose administered on 5/4/2023. Patient then had PET/CT on 2/02/2023, which at that time showed 2 thickened hypermetabolic small bowel loops with SUV max of 11.07 and 13.72. They have felt that these findings were suspicious for an intussusception and a CT scan of the abdomen pelvis was recommended with and without contrast.  They still noted that there was no right axillary lymphadenopathy. Patient was then readmitted on 5/17/2023 through 5/18/2023, again due to acute on chronic blood loss anemia and received 2 units of packed red cells.   It was suspected to be an upper GI bleed and was referred for outpatient capsule endoscopy. At the time of presentation on 5/17/2023 he had offered a 4-day history of black stools and dizziness in the morning. When he presented to the ED his hemoglobin was 5.7. After transfusion of 2 units of packed red cells his hemoglobin was up to 7.1. Last hemoglobin on file as outpatient on 5/25/2023 was 8.4 with ferritin of 11. Presented to [address] [place] [address] ER on 6/28/23 with c/o weakness, epigastric pain and melanotic stools. Hgb 4.6. PMH c/w DM, gastric ulcers with h/o GI bleed s/p IV iron. He underwent an EGD which did not show any evidence of upper GI bleeding. There were 2 small superficial ulcers/erosions in the duodenal bulb which were not felt to be the cause of the profound anemia with hemoglobin of less than 5 on admission. Patient was recommended to have a CT enterography due to PET/CT on 2/2/2023 with 2 thickened hypermetabolic masses and small bowel which were felt to be secondary to intussusception. Capsule endoscopy was to be considered pending results. CT enterography was performed on 6/29/2023, which showed an 8.7 cm area of masslike thickening involving a loop of small bowel within the right lower quadrant. There was an additional 7 cm area of masslike thickening involving a loop of small bowel within the left lower quadrant. These findings were felt to be suspicious of metastatic disease to small bowel given best history of melanoma. S/p en bloc small bowel resection and partial omentectomy ([facility]) on 6/30/23 - two separate foci of metastatic melanoma, 7.5 cm and 6.5 cm, BRAF mutant. S/p cycle 4 Ipilimumab 3 mg/Nivolumab 1 mg. Tolerated very well. Feels well.  Daughter present for encounter who translated the conversation     Fatigue:  Yes, mild    Fevers:  No    Appetite/taste changes:  No    Mucositis:  No    Weight changes:  No    Nausea/vomiting:  No    Diarrhea:  No    Constipation:  No    Peripheral neuropathy:  No       He had removal of L salivary gland stone removed by ENT on 10/2/2023. Was planning on going back home to [facility] for tx however no appts are set up. He now reports that he will likely be coming back here for tx q 2 weeks. ECOG PS 0    Review of Systems:   Review of Systems   Respiratory:  Negative for cough and shortness of breath. Cardiovascular:  Negative for chest pain, leg swelling and palpitations. Gastrointestinal:  Negative for abdominal pain and blood in stool. Genitourinary:  Negative for difficulty urinating, dysuria, frequency and hematuria. Musculoskeletal:  Positive for arthralgias (on and off at different joints). Negative for back pain. Skin:  Negative for rash. Neurological:  Negative for dizziness and headaches. Hematological:  Negative for adenopathy. Does not bruise/bleed easily. Psychiatric/Behavioral:  Negative for sleep disturbance. No current outpatient medications on file. Allergies:   No Known Allergies    Past Medical History:   Diagnosis Date    Anemia     with multiple blood transfusion    GIB (gastrointestinal bleeding) 12/2022    Melanoma ([facility] [address] 06/01/2021    right shoulder s/p surgical excision and skin graft, adjuvant BRAF/MEK inhibitors for 1.5 yrs    Personal history of antineoplastic chemotherapy      Past Surgical History:   Procedure Laterality Date    OTHER SURGICAL HISTORY      small intestine surgery    UPPER GI ENDOSCOPY,EXAM  12/01/2022     Social History     Socioeconomic History    Marital status: Single   Tobacco Use    Smoking status: Never    Smokeless tobacco: Never   Vaping Use    Vaping Use: Never used   Substance and Sexual Activity    Alcohol use: Not Currently    Drug use: Never       No family history on file. PHYSICAL EXAM:    There were no vitals taken for this visit.   Wt Readings from Last 6 Encounters:   10/27/23 96.3 kg (212 lb 6.4 oz)   10/23/23 96.6 kg (213 lb)   10/09/23 96.6 kg (213 lb)   10/06/23 96.6 kg (213 lb)   10/02/23 97.1 kg (214 lb)   09/15/23 97.8 kg (215 lb 9.6 oz)     Physical Exam  General: Patient is alert, not in acute distress. HEENT: EOMs intact. PERRL, MMM. Neck: No JVD. No palpable lymphadenopathy. Neck is supple. Tender at surgical site. Chest: Clear to auscultation. Heart: Regular rate and rhythm. Abdomen: Soft, non tender with good bowel sounds. Extremities: No edema. Neurological: Grossly intact. Psych/Depression: nl        ASSESSMENT/PLAN:   No diagnosis found. 1. Malignant melanoma of right upper extremity including shoulder (HCC)  Proceed with treatment with nivolumab and ipilimumab x 4 cycles followed by maintenance nivolumab for total of 18 to 24 months. Discussed goal of treatment is still potentially curative with these agents. S/p cycle 4 Ipilimumab 3 mg/Nivolumab 1 mg. Tumor assessment after the fourth cycle of treatment with marked response to therapy. Proceed with cycle 6 now only maintenance nivolumab at 3 mg/kg every 2 weeks. This will be to complete a total of 18 months to 24 months of treatment from initiation of therapy. He is working on transfering care back to [facility] but continue with tx with our team in the meantime until all is set up. He will have repeat PET/CT in 3 months. This will be in January 2024. FU with Dr. Anthony Schwartz thereafter. Patient is tolerating very well. 2. RLE with pain and discomfort. Imaging c/w a [place]'s cyst      No orders of the defined types were placed in this encounter. Call prn. MDM: high, malignancy, life threatening. Drug therapy requiring intensive monitoring for tox    Jem Swartz PA-C      Results From Past 48 Hours:  No results found for this or any previous visit (from the past 48 hour(s)). Imaging & Referrals:  None   No orders of the defined types were placed in this encounter.   PROCEDURE: PET/CT (NON-STAND) SCANS(SKULL TO TOES) (ESC=41613)     COMPARISON: [facility], [facility]. [facility] [address], PET (NON-STAND) SCANS(SKULL TO TOES) (ZOC=85154), 8/01/2023, 7:28 AM.     INDICATIONS: C78.89 Melanoma metastatic to digestive organ (HCC) C43.61 Malignant melanoma of right upper extremity including shoulder ([address] Right upper extremity melanoma with metastasis to mesenteric lymph nodes. TECHNIQUE: After obtaining the patient's consent, 59.3 millicuries of W-40 FDG was administered into the left antecubital vein. Whole body PET/CT imaging was performed of the entire body, skull to feet, with multi-planar imaging without oral or  intravenous contrast material, using a dedicated integrated PET/CT scanner. PATIENT BLOOD GLUCOSE: 118 mg/dL. UPTAKE TIME:  61 minutes for torso. FINDINGS:  Attenuation corrected and non-attenuation corrected images were reviewed from the head to the feet. Mediastinal blood pool is 2.31 max SUV, and hepatic blood pool is 3.00 max SUV. HEAD/NECK: Normal.  No pathological FDG activity. LUNGS: Normal.  No pathological FDG activity. No suspicious nodules on CT imaging. MEDIASTINUM/KIERSTEN: Normal.  No pathological FDG activity. CHEST WALL/AXILLA: Mildly prominent right axillary lymph node has maximum SUV of 1.59 with normal morphology. This is favored reactive. No pathological FDG activity. ABDOMEN/ PELVIS: Previously noted hypermetabolic lymph nodes have markedly decreased in size. The largest measures 2.2 x 1.6 cm series 2, image 140, previously 3.5 x 3.7 cm. The maximum SUV is 3.73, previously 12.53. The intermediate measures 2.0 x 1.0   cm series 2, image 137, previously 3.7 x 1.5 cm. The maximum SUV is 2.44, previously 12.52. The smallest measures 1.4 x 0.7 cm series 2, image 140, previously 1.4 x 1.2 cm. The maximum SUV is 1.60, previously 5.25. MUSCULOSKELETAL: Minimally elevated activity at the L1 and L2 spinous processes is favored secondary to hypertrophic degenerative changes. No pathological FDG activity.   No suspicious lesions on CT imaging. EXTREMITIES: Postsurgical changes of right upper extremity soft tissue resection. No pathological FDG activity. No suspicious lesions on CT imaging. OTHER: Negative. Impression  CONCLUSION:     There is a history of right upper extremity melanoma with metastasis. Hypermetabolic lymph nodes at the mesenteric root have decreased in size and metabolic activity suggesting treatment response. No other evidence of metastatic or recurrent disease.            Dictated by (CST): Frankey Bis MD on 10/18/2023 at 9:52 AM      Finalized by (CST): Frankey Bis MD on 10/18/2023 at 10:48 AM

## 2023-11-09 ENCOUNTER — NURSE ONLY (OUTPATIENT)
Dept: HEMATOLOGY/ONCOLOGY | Facility: HOSPITAL | Age: 58
End: 2023-11-09
Attending: INTERNAL MEDICINE
Payer: MEDICAID

## 2023-11-09 VITALS
RESPIRATION RATE: 16 BRPM | BODY MASS INDEX: 32.92 KG/M2 | TEMPERATURE: 99 F | HEART RATE: 63 BPM | DIASTOLIC BLOOD PRESSURE: 81 MMHG | SYSTOLIC BLOOD PRESSURE: 140 MMHG | OXYGEN SATURATION: 96 % | WEIGHT: 217.19 LBS | HEIGHT: 68 IN

## 2023-11-09 DIAGNOSIS — C43.61 MALIGNANT MELANOMA OF RIGHT UPPER EXTREMITY INCLUDING SHOULDER (HCC): Primary | ICD-10-CM

## 2023-11-09 DIAGNOSIS — Z51.12 ENCOUNTER FOR ANTINEOPLASTIC IMMUNOTHERAPY: ICD-10-CM

## 2023-11-09 DIAGNOSIS — Z51.11 CHEMOTHERAPY MANAGEMENT, ENCOUNTER FOR: ICD-10-CM

## 2023-11-09 LAB
ALBUMIN SERPL-MCNC: 4.4 G/DL (ref 3.2–4.8)
ALBUMIN/GLOB SERPL: 1.5 {RATIO} (ref 1–2)
ALP LIVER SERPL-CCNC: 112 U/L
ALT SERPL-CCNC: 16 U/L
ANION GAP SERPL CALC-SCNC: 4 MMOL/L (ref 0–18)
AST SERPL-CCNC: 15 U/L (ref ?–34)
BASOPHILS # BLD AUTO: 0.06 X10(3) UL (ref 0–0.2)
BASOPHILS NFR BLD AUTO: 0.8 %
BILIRUB SERPL-MCNC: 0.4 MG/DL (ref 0.3–1.2)
BUN BLD-MCNC: 11 MG/DL (ref 9–23)
BUN/CREAT SERPL: 12.4 (ref 10–20)
CALCIUM BLD-MCNC: 9.3 MG/DL (ref 8.7–10.4)
CHLORIDE SERPL-SCNC: 109 MMOL/L (ref 98–112)
CO2 SERPL-SCNC: 27 MMOL/L (ref 21–32)
CREAT BLD-MCNC: 0.89 MG/DL
DEPRECATED RDW RBC AUTO: 54.6 FL (ref 35.1–46.3)
EGFRCR SERPLBLD CKD-EPI 2021: 99 ML/MIN/1.73M2 (ref 60–?)
EOSINOPHIL # BLD AUTO: 0.11 X10(3) UL (ref 0–0.7)
EOSINOPHIL NFR BLD AUTO: 1.5 %
ERYTHROCYTE [DISTWIDTH] IN BLOOD BY AUTOMATED COUNT: 17.3 % (ref 11–15)
GLOBULIN PLAS-MCNC: 3 G/DL (ref 2.8–4.4)
GLUCOSE BLD-MCNC: 112 MG/DL (ref 70–99)
HCT VFR BLD AUTO: 45.1 %
HGB BLD-MCNC: 14.9 G/DL
IMM GRANULOCYTES # BLD AUTO: 0.01 X10(3) UL (ref 0–1)
IMM GRANULOCYTES NFR BLD: 0.1 %
LYMPHOCYTES # BLD AUTO: 2.84 X10(3) UL (ref 1–4)
LYMPHOCYTES NFR BLD AUTO: 40 %
MCH RBC QN AUTO: 28.1 PG (ref 26–34)
MCHC RBC AUTO-ENTMCNC: 33 G/DL (ref 31–37)
MCV RBC AUTO: 85.1 FL
MONOCYTES # BLD AUTO: 0.62 X10(3) UL (ref 0.1–1)
MONOCYTES NFR BLD AUTO: 8.7 %
NEUTROPHILS # BLD AUTO: 3.46 X10 (3) UL (ref 1.5–7.7)
NEUTROPHILS # BLD AUTO: 3.46 X10(3) UL (ref 1.5–7.7)
NEUTROPHILS NFR BLD AUTO: 48.9 %
OSMOLALITY SERPL CALC.SUM OF ELEC: 290 MOSM/KG (ref 275–295)
PLATELET # BLD AUTO: 266 10(3)UL (ref 150–450)
POTASSIUM SERPL-SCNC: 4.3 MMOL/L (ref 3.5–5.1)
PROT SERPL-MCNC: 7.4 G/DL (ref 5.7–8.2)
RBC # BLD AUTO: 5.3 X10(6)UL
SODIUM SERPL-SCNC: 140 MMOL/L (ref 136–145)
WBC # BLD AUTO: 7.1 X10(3) UL (ref 4–11)

## 2023-11-09 PROCEDURE — 85025 COMPLETE CBC W/AUTO DIFF WBC: CPT

## 2023-11-09 PROCEDURE — 99211 OFF/OP EST MAY X REQ PHY/QHP: CPT

## 2023-11-09 PROCEDURE — 80053 COMPREHEN METABOLIC PANEL: CPT

## 2023-11-09 PROCEDURE — 36415 COLL VENOUS BLD VENIPUNCTURE: CPT

## 2023-11-10 ENCOUNTER — OFFICE VISIT (OUTPATIENT)
Dept: HEMATOLOGY/ONCOLOGY | Facility: HOSPITAL | Age: 58
End: 2023-11-10
Attending: INTERNAL MEDICINE
Payer: MEDICAID

## 2023-11-10 VITALS
TEMPERATURE: 98 F | OXYGEN SATURATION: 95 % | RESPIRATION RATE: 16 BRPM | DIASTOLIC BLOOD PRESSURE: 72 MMHG | HEART RATE: 67 BPM | SYSTOLIC BLOOD PRESSURE: 131 MMHG

## 2023-11-10 DIAGNOSIS — C43.61 MALIGNANT MELANOMA OF RIGHT UPPER EXTREMITY INCLUDING SHOULDER (HCC): Primary | ICD-10-CM

## 2023-11-10 PROCEDURE — 96413 CHEMO IV INFUSION 1 HR: CPT

## 2023-11-10 NOTE — PROGRESS NOTES
Pt here for C5D1 Drug(s)OPDIVO. Arrives Ambulating independently, accompanied by Self     Patient was evaluated today by Treatment Nurse. Oral medications included in this regimen:  no    Patient confirms comprehension of cancer treatment schedule:  yes    Pregnancy screening:  Not applicable    Modifications in dose or schedule:  No    Medications appearance and physical integrity checked by RN: yes.     Chemotherapy IV pump settings verified by 2 RNs:  No - immunotherapy  Frequency of blood return and site check throughout administration: Prior to administration and At completion of therapy     Infusion/treatment outcome:  patient tolerated treatment without incident    Education Record    Learner:  Patient  Barriers / Limitations:  Language  Method:  Discussion  Education / instructions given:  POC  Outcome:  Shows understanding    Discharged Home, Ambulating independently, accompanied by:Self    Patient/family verbalized understanding of future appointments: by printed AVS

## 2023-11-22 ENCOUNTER — TELEPHONE (OUTPATIENT)
Dept: HEMATOLOGY/ONCOLOGY | Facility: HOSPITAL | Age: 58
End: 2023-11-22

## 2023-11-22 NOTE — TELEPHONE ENCOUNTER
I called the niece she answered but said she was out of town and could not help me. I then(using language line) called both home and cell of the patient and brother the emergency contact no answer from anyone of them. Canceled appointments for both 11/22 and 11/24 since no show on 11/22. Rescheduled for the following unable to communicate with family. Also sent a Whatever message to inform of new dates and times.

## 2023-11-24 ENCOUNTER — APPOINTMENT (OUTPATIENT)
Dept: HEMATOLOGY/ONCOLOGY | Facility: HOSPITAL | Age: 58
End: 2023-11-24
Attending: INTERNAL MEDICINE
Payer: MEDICAID

## 2023-11-29 ENCOUNTER — TELEPHONE (OUTPATIENT)
Dept: HEMATOLOGY/ONCOLOGY | Facility: HOSPITAL | Age: 58
End: 2023-11-29

## 2023-11-29 NOTE — TELEPHONE ENCOUNTER
Patient called using donny Lara #[id number]) Patient missed 2 tx appointments. Patient stated that he had a piece of paper with appointments and thought next appointment was 12/5. Informed tx every 14 days. Will have infusion call to reschedule appointments in [place]. Patient not using mychart. Patient verbalizes understanding.

## 2023-11-30 ENCOUNTER — APPOINTMENT (OUTPATIENT)
Dept: HEMATOLOGY/ONCOLOGY | Facility: HOSPITAL | Age: 58
End: 2023-11-30
Attending: INTERNAL MEDICINE
Payer: MEDICAID

## 2023-12-06 ENCOUNTER — OFFICE VISIT (OUTPATIENT)
Dept: HEMATOLOGY/ONCOLOGY | Facility: HOSPITAL | Age: 58
End: 2023-12-06
Attending: INTERNAL MEDICINE
Payer: MEDICAID

## 2023-12-06 VITALS
BODY MASS INDEX: 33.4 KG/M2 | DIASTOLIC BLOOD PRESSURE: 72 MMHG | SYSTOLIC BLOOD PRESSURE: 133 MMHG | WEIGHT: 220.38 LBS | RESPIRATION RATE: 16 BRPM | HEART RATE: 58 BPM | HEIGHT: 68 IN | TEMPERATURE: 98 F | OXYGEN SATURATION: 98 %

## 2023-12-06 DIAGNOSIS — C43.61 MALIGNANT MELANOMA OF RIGHT UPPER EXTREMITY INCLUDING SHOULDER (HCC): Primary | ICD-10-CM

## 2023-12-06 DIAGNOSIS — C78.89 MELANOMA METASTATIC TO DIGESTIVE ORGAN (HCC): ICD-10-CM

## 2023-12-06 DIAGNOSIS — Z51.12 ENCOUNTER FOR ANTINEOPLASTIC IMMUNOTHERAPY: ICD-10-CM

## 2023-12-06 LAB
ALBUMIN SERPL-MCNC: 4.4 G/DL (ref 3.2–4.8)
ALBUMIN/GLOB SERPL: 1.5 {RATIO} (ref 1–2)
ALP LIVER SERPL-CCNC: 96 U/L
ALT SERPL-CCNC: 15 U/L
ANION GAP SERPL CALC-SCNC: 8 MMOL/L (ref 0–18)
AST SERPL-CCNC: 13 U/L (ref ?–34)
BASOPHILS # BLD AUTO: 0.07 X10(3) UL (ref 0–0.2)
BASOPHILS NFR BLD AUTO: 1.1 %
BILIRUB SERPL-MCNC: 0.5 MG/DL (ref 0.3–1.2)
BUN BLD-MCNC: 18 MG/DL (ref 9–23)
BUN/CREAT SERPL: 20.5 (ref 10–20)
CALCIUM BLD-MCNC: 9.5 MG/DL (ref 8.7–10.4)
CHLORIDE SERPL-SCNC: 106 MMOL/L (ref 98–112)
CO2 SERPL-SCNC: 26 MMOL/L (ref 21–32)
CREAT BLD-MCNC: 0.88 MG/DL
DEPRECATED RDW RBC AUTO: 48.9 FL (ref 35.1–46.3)
EGFRCR SERPLBLD CKD-EPI 2021: 100 ML/MIN/1.73M2 (ref 60–?)
EOSINOPHIL # BLD AUTO: 0.08 X10(3) UL (ref 0–0.7)
EOSINOPHIL NFR BLD AUTO: 1.3 %
ERYTHROCYTE [DISTWIDTH] IN BLOOD BY AUTOMATED COUNT: 15.5 % (ref 11–15)
GLOBULIN PLAS-MCNC: 3 G/DL (ref 2.8–4.4)
GLUCOSE BLD-MCNC: 194 MG/DL (ref 70–99)
HCT VFR BLD AUTO: 45.6 %
HGB BLD-MCNC: 14.9 G/DL
IMM GRANULOCYTES # BLD AUTO: 0.01 X10(3) UL (ref 0–1)
IMM GRANULOCYTES NFR BLD: 0.2 %
LYMPHOCYTES # BLD AUTO: 2.71 X10(3) UL (ref 1–4)
LYMPHOCYTES NFR BLD AUTO: 43.2 %
MCH RBC QN AUTO: 28.8 PG (ref 26–34)
MCHC RBC AUTO-ENTMCNC: 32.7 G/DL (ref 31–37)
MCV RBC AUTO: 88.2 FL
MONOCYTES # BLD AUTO: 0.53 X10(3) UL (ref 0.1–1)
MONOCYTES NFR BLD AUTO: 8.5 %
NEUTROPHILS # BLD AUTO: 2.87 X10 (3) UL (ref 1.5–7.7)
NEUTROPHILS # BLD AUTO: 2.87 X10(3) UL (ref 1.5–7.7)
NEUTROPHILS NFR BLD AUTO: 45.7 %
OSMOLALITY SERPL CALC.SUM OF ELEC: 297 MOSM/KG (ref 275–295)
PLATELET # BLD AUTO: 289 10(3)UL (ref 150–450)
POTASSIUM SERPL-SCNC: 4.2 MMOL/L (ref 3.5–5.1)
PROT SERPL-MCNC: 7.4 G/DL (ref 5.7–8.2)
RBC # BLD AUTO: 5.17 X10(6)UL
SODIUM SERPL-SCNC: 140 MMOL/L (ref 136–145)
T4 FREE SERPL-MCNC: 1.1 NG/DL (ref 0.8–1.7)
TSI SER-ACNC: 2.1 MIU/ML (ref 0.55–4.78)
WBC # BLD AUTO: 6.3 X10(3) UL (ref 4–11)

## 2023-12-06 PROCEDURE — 80053 COMPREHEN METABOLIC PANEL: CPT

## 2023-12-06 PROCEDURE — 85025 COMPLETE CBC W/AUTO DIFF WBC: CPT

## 2023-12-06 PROCEDURE — 84443 ASSAY THYROID STIM HORMONE: CPT

## 2023-12-06 PROCEDURE — 84439 ASSAY OF FREE THYROXINE: CPT

## 2023-12-06 PROCEDURE — 99215 OFFICE O/P EST HI 40 MIN: CPT | Performed by: NURSE PRACTITIONER

## 2023-12-06 PROCEDURE — 96413 CHEMO IV INFUSION 1 HR: CPT

## 2023-12-06 NOTE — PROGRESS NOTES
Pt here for C7D1 Drug(s)Opdivo. Arrives Ambulating independently, accompanied by Family member     Patient was evaluated today by MD.    Oral medications included in this regimen:  no    Patient confirms comprehension of cancer treatment schedule:  yes    Pregnancy screening:  Not applicable    Modifications in dose or schedule:  Yes  Opdivo now flat dose of 240mg    Medications appearance and physical integrity checked by RN: yes. Chemotherapy IV pump settings verified by 2 RNs:  No due to targeted therapy IV administration.   Frequency of blood return and site check throughout administration: Prior to administration and At completion of therapy     Infusion/treatment outcome:  patient tolerated treatment without incident    Education Record    Learner:  Family Member  Barriers / Limitations:  Language (family member speaks [place])  Method:  Discussion  Education / instructions given:  Plan of care  Outcome:  Shows understanding    Discharged Home, Ambulating independently, accompanied by:Family member    Patient/family verbalized understanding of future appointments: by printed AVS

## 2023-12-08 ENCOUNTER — APPOINTMENT (OUTPATIENT)
Dept: HEMATOLOGY/ONCOLOGY | Facility: HOSPITAL | Age: 58
End: 2023-12-08
Attending: INTERNAL MEDICINE
Payer: MEDICAID

## 2023-12-08 ENCOUNTER — APPOINTMENT (OUTPATIENT)
Dept: HEMATOLOGY/ONCOLOGY | Facility: HOSPITAL | Age: 58
End: 2023-12-08
Attending: NURSE PRACTITIONER
Payer: MEDICAID

## 2023-12-15 ENCOUNTER — APPOINTMENT (OUTPATIENT)
Dept: HEMATOLOGY/ONCOLOGY | Facility: HOSPITAL | Age: 58
End: 2023-12-15
Attending: NURSE PRACTITIONER
Payer: MEDICAID

## 2023-12-15 ENCOUNTER — APPOINTMENT (OUTPATIENT)
Dept: HEMATOLOGY/ONCOLOGY | Facility: HOSPITAL | Age: 58
End: 2023-12-15
Attending: INTERNAL MEDICINE
Payer: MEDICAID

## 2023-12-21 ENCOUNTER — OFFICE VISIT (OUTPATIENT)
Dept: HEMATOLOGY/ONCOLOGY | Facility: HOSPITAL | Age: 58
End: 2023-12-21
Attending: NURSE PRACTITIONER
Payer: MEDICAID

## 2023-12-21 ENCOUNTER — NURSE ONLY (OUTPATIENT)
Dept: HEMATOLOGY/ONCOLOGY | Facility: HOSPITAL | Age: 58
End: 2023-12-21
Attending: INTERNAL MEDICINE
Payer: MEDICAID

## 2023-12-21 VITALS
DIASTOLIC BLOOD PRESSURE: 65 MMHG | HEIGHT: 68 IN | TEMPERATURE: 98 F | HEART RATE: 70 BPM | BODY MASS INDEX: 33.49 KG/M2 | WEIGHT: 221 LBS | OXYGEN SATURATION: 97 % | SYSTOLIC BLOOD PRESSURE: 122 MMHG | RESPIRATION RATE: 18 BRPM

## 2023-12-21 DIAGNOSIS — D50.0 IRON DEFICIENCY ANEMIA DUE TO CHRONIC BLOOD LOSS: ICD-10-CM

## 2023-12-21 DIAGNOSIS — C43.61 MALIGNANT MELANOMA OF RIGHT UPPER EXTREMITY INCLUDING SHOULDER (HCC): Primary | ICD-10-CM

## 2023-12-21 DIAGNOSIS — C78.89 MELANOMA METASTATIC TO DIGESTIVE ORGAN (HCC): ICD-10-CM

## 2023-12-21 DIAGNOSIS — Z51.12 ENCOUNTER FOR ANTINEOPLASTIC IMMUNOTHERAPY: ICD-10-CM

## 2023-12-21 LAB
ALBUMIN SERPL-MCNC: 4.5 G/DL (ref 3.2–4.8)
ALBUMIN/GLOB SERPL: 1.5 {RATIO} (ref 1–2)
ALP LIVER SERPL-CCNC: 94 U/L
ALT SERPL-CCNC: 18 U/L
ANION GAP SERPL CALC-SCNC: 8 MMOL/L (ref 0–18)
AST SERPL-CCNC: 21 U/L (ref ?–34)
BASOPHILS # BLD AUTO: 0.08 X10(3) UL (ref 0–0.2)
BASOPHILS NFR BLD AUTO: 1.2 %
BILIRUB SERPL-MCNC: 0.6 MG/DL (ref 0.3–1.2)
BUN BLD-MCNC: 17 MG/DL (ref 9–23)
BUN/CREAT SERPL: 15.7 (ref 10–20)
CALCIUM BLD-MCNC: 9.7 MG/DL (ref 8.7–10.4)
CHLORIDE SERPL-SCNC: 105 MMOL/L (ref 98–112)
CO2 SERPL-SCNC: 25 MMOL/L (ref 21–32)
CREAT BLD-MCNC: 1.08 MG/DL
DEPRECATED RDW RBC AUTO: 49.2 FL (ref 35.1–46.3)
EGFRCR SERPLBLD CKD-EPI 2021: 80 ML/MIN/1.73M2 (ref 60–?)
EOSINOPHIL # BLD AUTO: 0.06 X10(3) UL (ref 0–0.7)
EOSINOPHIL NFR BLD AUTO: 0.9 %
ERYTHROCYTE [DISTWIDTH] IN BLOOD BY AUTOMATED COUNT: 15.2 % (ref 11–15)
GLOBULIN PLAS-MCNC: 3 G/DL (ref 2.8–4.4)
GLUCOSE BLD-MCNC: 184 MG/DL (ref 70–99)
HCT VFR BLD AUTO: 45.3 %
HGB BLD-MCNC: 15 G/DL
IMM GRANULOCYTES # BLD AUTO: 0.02 X10(3) UL (ref 0–1)
IMM GRANULOCYTES NFR BLD: 0.3 %
LYMPHOCYTES # BLD AUTO: 2.54 X10(3) UL (ref 1–4)
LYMPHOCYTES NFR BLD AUTO: 36.9 %
MCH RBC QN AUTO: 29.6 PG (ref 26–34)
MCHC RBC AUTO-ENTMCNC: 33.1 G/DL (ref 31–37)
MCV RBC AUTO: 89.3 FL
MONOCYTES # BLD AUTO: 0.76 X10(3) UL (ref 0.1–1)
MONOCYTES NFR BLD AUTO: 11 %
NEUTROPHILS # BLD AUTO: 3.43 X10 (3) UL (ref 1.5–7.7)
NEUTROPHILS # BLD AUTO: 3.43 X10(3) UL (ref 1.5–7.7)
NEUTROPHILS NFR BLD AUTO: 49.7 %
OSMOLALITY SERPL CALC.SUM OF ELEC: 292 MOSM/KG (ref 275–295)
PLATELET # BLD AUTO: 282 10(3)UL (ref 150–450)
POTASSIUM SERPL-SCNC: 4.5 MMOL/L (ref 3.5–5.1)
PROT SERPL-MCNC: 7.5 G/DL (ref 5.7–8.2)
RBC # BLD AUTO: 5.07 X10(6)UL
SODIUM SERPL-SCNC: 138 MMOL/L (ref 136–145)
WBC # BLD AUTO: 6.9 X10(3) UL (ref 4–11)

## 2023-12-21 PROCEDURE — 99215 OFFICE O/P EST HI 40 MIN: CPT | Performed by: NURSE PRACTITIONER

## 2023-12-21 PROCEDURE — 85025 COMPLETE CBC W/AUTO DIFF WBC: CPT

## 2023-12-21 PROCEDURE — 96413 CHEMO IV INFUSION 1 HR: CPT

## 2023-12-21 PROCEDURE — 80053 COMPREHEN METABOLIC PANEL: CPT

## 2023-12-21 NOTE — PROGRESS NOTES
Pt here for C8D1 Drug(s)Opdivo. Arrives Ambulating independently, accompanied by Family member     Patient was evaluated today by EMIR. Oral medications included in this regimen:  no  Patient confirms comprehension of cancer treatment schedule:  yes  Pregnancy screening:  Not applicable  Modifications in dose or schedule:  No  Medications appearance and physical integrity checked by RN: yes. Chemotherapy IV pump settings verified by 2 RNs:  n/a - cancer treatment injection administered. Frequency of blood return and site check throughout administration: Prior to administration and At completion of therapy   Infusion/treatment outcome:  patient tolerated treatment without incident    Education Record    Learner:  Patient and Family Member  Barriers / Limitations:  Language, patient using family member as . Method:  Reinforcement  Education / instructions given:  Plan of care.   Outcome:  Shows understanding    Discharged Home, Ambulating independently, accompanied by:Family member    Patient/family verbalized understanding of future appointments: by printed AVS

## 2024-01-03 ENCOUNTER — OFFICE VISIT (OUTPATIENT)
Dept: HEMATOLOGY/ONCOLOGY | Facility: HOSPITAL | Age: 59
End: 2024-01-03
Attending: NURSE PRACTITIONER
Payer: MEDICAID

## 2024-01-03 ENCOUNTER — OFFICE VISIT (OUTPATIENT)
Dept: HEMATOLOGY/ONCOLOGY | Facility: HOSPITAL | Age: 59
End: 2024-01-03
Attending: INTERNAL MEDICINE
Payer: MEDICAID

## 2024-01-03 VITALS
HEART RATE: 65 BPM | BODY MASS INDEX: 33.65 KG/M2 | WEIGHT: 222 LBS | OXYGEN SATURATION: 97 % | SYSTOLIC BLOOD PRESSURE: 135 MMHG | TEMPERATURE: 98 F | RESPIRATION RATE: 16 BRPM | DIASTOLIC BLOOD PRESSURE: 74 MMHG | HEIGHT: 68 IN

## 2024-01-03 DIAGNOSIS — C78.89 MELANOMA METASTATIC TO DIGESTIVE ORGAN (HCC): ICD-10-CM

## 2024-01-03 DIAGNOSIS — Z51.12 ENCOUNTER FOR ANTINEOPLASTIC IMMUNOTHERAPY: ICD-10-CM

## 2024-01-03 DIAGNOSIS — C43.61 MALIGNANT MELANOMA OF RIGHT UPPER EXTREMITY INCLUDING SHOULDER (HCC): Primary | ICD-10-CM

## 2024-01-03 LAB
ALBUMIN SERPL-MCNC: 4.4 G/DL (ref 3.2–4.8)
ALBUMIN/GLOB SERPL: 1.4 {RATIO} (ref 1–2)
ALP LIVER SERPL-CCNC: 97 U/L
ALT SERPL-CCNC: 18 U/L
ANION GAP SERPL CALC-SCNC: 6 MMOL/L (ref 0–18)
AST SERPL-CCNC: 15 U/L (ref ?–34)
BASOPHILS # BLD AUTO: 0.08 X10(3) UL (ref 0–0.2)
BASOPHILS NFR BLD AUTO: 1.1 %
BILIRUB SERPL-MCNC: 0.7 MG/DL (ref 0.3–1.2)
BUN BLD-MCNC: 15 MG/DL (ref 9–23)
BUN/CREAT SERPL: 18.3 (ref 10–20)
CALCIUM BLD-MCNC: 9.3 MG/DL (ref 8.7–10.4)
CHLORIDE SERPL-SCNC: 105 MMOL/L (ref 98–112)
CO2 SERPL-SCNC: 27 MMOL/L (ref 21–32)
CREAT BLD-MCNC: 0.82 MG/DL
DEPRECATED RDW RBC AUTO: 50.4 FL (ref 35.1–46.3)
EGFRCR SERPLBLD CKD-EPI 2021: 102 ML/MIN/1.73M2 (ref 60–?)
EOSINOPHIL # BLD AUTO: 0.07 X10(3) UL (ref 0–0.7)
EOSINOPHIL NFR BLD AUTO: 1 %
ERYTHROCYTE [DISTWIDTH] IN BLOOD BY AUTOMATED COUNT: 15.4 % (ref 11–15)
GLOBULIN PLAS-MCNC: 3.2 G/DL (ref 2.8–4.4)
GLUCOSE BLD-MCNC: 136 MG/DL (ref 70–99)
HCT VFR BLD AUTO: 43.4 %
HGB BLD-MCNC: 14.7 G/DL
IMM GRANULOCYTES # BLD AUTO: 0.01 X10(3) UL (ref 0–1)
IMM GRANULOCYTES NFR BLD: 0.1 %
LYMPHOCYTES # BLD AUTO: 2.19 X10(3) UL (ref 1–4)
LYMPHOCYTES NFR BLD AUTO: 31.2 %
MCH RBC QN AUTO: 30.1 PG (ref 26–34)
MCHC RBC AUTO-ENTMCNC: 33.9 G/DL (ref 31–37)
MCV RBC AUTO: 88.9 FL
MONOCYTES # BLD AUTO: 0.8 X10(3) UL (ref 0.1–1)
MONOCYTES NFR BLD AUTO: 11.4 %
NEUTROPHILS # BLD AUTO: 3.88 X10 (3) UL (ref 1.5–7.7)
NEUTROPHILS # BLD AUTO: 3.88 X10(3) UL (ref 1.5–7.7)
NEUTROPHILS NFR BLD AUTO: 55.2 %
OSMOLALITY SERPL CALC.SUM OF ELEC: 289 MOSM/KG (ref 275–295)
PLATELET # BLD AUTO: 247 10(3)UL (ref 150–450)
POTASSIUM SERPL-SCNC: 4 MMOL/L (ref 3.5–5.1)
PROT SERPL-MCNC: 7.6 G/DL (ref 5.7–8.2)
RBC # BLD AUTO: 4.88 X10(6)UL
SODIUM SERPL-SCNC: 138 MMOL/L (ref 136–145)
WBC # BLD AUTO: 7 X10(3) UL (ref 4–11)

## 2024-01-03 PROCEDURE — 85025 COMPLETE CBC W/AUTO DIFF WBC: CPT

## 2024-01-03 PROCEDURE — 80053 COMPREHEN METABOLIC PANEL: CPT

## 2024-01-03 PROCEDURE — 99215 OFFICE O/P EST HI 40 MIN: CPT | Performed by: NURSE PRACTITIONER

## 2024-01-03 PROCEDURE — 96413 CHEMO IV INFUSION 1 HR: CPT

## 2024-01-03 NOTE — PROGRESS NOTES
HPI   Cole Kendrick is a 58 year old male here for follow up of   Encounter Diagnoses   Name Primary?    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    Oncology history:   Extensive outside records from multiple facilities since March 2021 were reviewed.  This is a synopsis of those records, details can be found on Care Everywhere.  Patient had presented at [facility] in [facility] March 2021 due to a mass of the right shoulder.  At that time he underwent imaging with an MRI which showed a superficial right upper arm mass which appeared to be veins within the dermis.  It measured 3.9 cm x 8.3 cm x 5.9 cm it was felt that there was likely internal process of hemorrhage within the mass.  The mass demonstrated significant enhancement and it extended to the involved adjacent thickened areas of the dermis.  This mass was abutting the deltoid muscle and mildly effacing the deltoid muscle but not invading it.  There is also evidence of 13 mm short axis diameter right axillary lymph node which appeared to have a thickened cortex and was felt to be abnormal.  Dedicated ultrasound of the right axilla was recommended for further evaluation.  There were other smaller lymph nodes adjacent to the area.  Patient subsequently underwent right axillary lymph node ultrasound guided biopsy, the area contained multiple and large lymph nodes which were measuring between 3.5 and 3.2 cm.  There is at least 3 of them.  Pathology report showed lymph node tissue showing features consistent with nonspecific reactive lymphoid hyperplasia, there is no evidence of metastatic disease, nor a lymphoproliferative process or malignancy.     Patient was then referred to cancer center at [facility] in [facility], and was evaluated by her surgical oncologist Dr. Fabián Dawn.  Per review of his  consultation report, the patient had stated he had noted a new (mole) on the right arm 9 months prior to his original presentation and had enlarged over several months and began to bleed.  It was not painful and was not causing any neuromuscular issues.  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  We did discuss causes the results of the MRI above.  I was discussed for the patient to proceed with a radical resection of the right upper arm mass with placement of the wound VAC after performing the wide excision of the mass.  Surgical resection was performed on 5/26/2021 with en bloc resection of 12 cm with partial resection of the underlying right deltoid muscle.  Final wound measured 15 cm in diameter by 4 cm deep, and a wound VAC in place.  The pathology report showed that this was consistent with a malignant melanoma that measured 10.5 cm and was focally in the epidermis abutting ulcer with minute focus of possible melanoma in situ.  BRAF V600E was positive.  As the tumor staging summary, the histologic type was not determined.  The Breslow depth could not be determined.  There was ulceration present.  The mitotic rate was greater than 1/millimeter square.  Lymphovascular invasion was present.  Tumor infiltrating lymphocytes were present but nonbrisk.  There is no tumor regression noted.  The peripheral margins, and the deep margins were negative for invasive melanoma.  Tumor was 15 mm from the closest peripheral margin and 10 mm from the closest deep margin.  Plastic surgery was consulted for wound closure with skin grafting.  Patient underwent skin grafting on 6/8/2021.  Donor site was from the right thigh.     Patient was then seeking oncologic care in [facility] in [facility].  The records from that organization are not available on Care  Everywhere.  The patient states that he was receiving treatment with the local oncologist, Dr. Arce, with about 8 pills a day he was taking twice a day.  He states that he would pick them up at his doctor's office.  He states that he had had a PET scan which is showed positive lymph nodes in his right axilla.  On records available in care everywhere on 3/3/2022, there was a CT scan of the chest, abdomen and pelvis with contrast, which was compared to a PET/CT from September 16, 2021.  This states that on the prior PET/CT from September 2021 there had been enlarged right axillary lymphadenopathy that had measured 2.6 x 2.1 cm, and on the current study from March 3, 2022 it had resolved.  There was no other evidence seen of metastatic disease.  There was a 1 cm flash enhancing lesion in the right anterior lobe of the liver which was felt to be a hemangioma.     Then on July 25, 2022, it was noted that the patient had iron deficiency anemia.  At that time, on the note from the infusion center on 9/8/2022, at which time the patient was receiving Venofer infusions, the only medication listed on his med list was tramadol.  He did receive infusion of 300 mg of Venofer x6 doses as an outpatient.  Last dose was given on 10/27/2022.     On November 7, 2022, the patient received a blood transfusion for hemoglobin of 6.2, transfusion occurred on 11/8/2022.  Patient was then admitted on 12/21/2023 and discharged on 12/23/2023 to [facility], due to secondary acute anemia from blood loss secondary to GI bleed.  At that time he had presented with several weeks of dizziness, weakness, and left lower extremity swelling.  His oncologist recommended he go to the ED for evaluation.  He did a left lower extremity Doppler which was negative for DVT.  Hemoglobin at the time was 3.2.  Patient had complained of several weeks of painless red-maroon or black-colored stools which were not often.  He denied any abdominal  pain.  He denied any nausea or vomiting.  Patient received a total of 6 units of packed red cells and underwent an EGD which revealed 5 gastric ulcers with no active bleeding.  He was placed on 3 months of PPI therapy twice daily and was recommended for repeat EGD in 2 months.  At that time, the only medication listed was metformin.   Patient was then readmitted on 1/4/2023 and discharged on 1/6/2023, due to a recurrent GI bleed.  At that time, he had reported an abrupt onset of a sensation of fatigue, diaphoresis and dizziness, and then started having dark stools.  When he presented to the ED, with hypotension and hemoglobin of 8.1.  There was concern for recurrent GI bleed, however there is no evidence of overt GI bleed.  He was transfused 2 units of packed red cells, with hemoglobin up to 9.3 on day of discharge.  It was not recommended to repeat endoscopy at the time.  He was recommended to continue with the pantoprazole twice daily.  He had also had a tagged red cell scan during the first admission which was negative.       Patient presented back to his oncologist on 01/11/2023, with a hemoglobin of 6.5.  On 1/12/2023 he was then transfused 2 units of packed red cells at the outpatient infusion center at [facility].  Subsequent hemoglobin on 1/19/2023 was 7.1.  She did have repeat iron studies on 1/23/2023, with a ferritin of 12, percent iron saturation of 4%, LDH elevated to 364, with a total bilirubin of 0.4.  He again was managed with Venofer 300 mg infusions weekly x9 weeks.  With last dose administered on 5/4/2023.     Patient then had PET/CT on 2/02/2023, which at that time showed 2 thickened hypermetabolic small bowel loops with SUV max of 11.07 and 13.72.  They have felt that these findings were suspicious for an intussusception and a CT scan of the abdomen pelvis was recommended with and without contrast.  They still noted that there was no right axillary lymphadenopathy.     Patient was then  readmitted on 5/17/2023 through 5/18/2023, again due to acute on chronic blood loss anemia and received 2 units of packed red cells.  It was suspected to be an upper GI bleed and was referred for outpatient capsule endoscopy.  At the time of presentation on 5/17/2023 he had offered a 4-day history of black stools and dizziness in the morning.  When he presented to the ED his hemoglobin was 5.7.  After transfusion of 2 units of packed red cells his hemoglobin was up to 7.1.  Last hemoglobin on file as outpatient on 5/25/2023 was 8.4 with ferritin of 11.     Presented to [facility] ER on 6/28/23 with c/o weakness, epigastric pain and melanotic stools.  Hgb 4.6.  PMH c/w DM, gastric ulcers with h/o GI bleed s/p IV iron.  He underwent an EGD which did not show any evidence of upper GI bleeding. There were 2 small superficial ulcers/erosions in the duodenal bulb which were not felt to be the cause of the profound anemia with hemoglobin of less than 5 on admission. Patient was recommended to have a CT enterography due to PET/CT on 2/2/2023 with 2 thickened hypermetabolic masses and small bowel which were felt to be secondary to intussusception. Capsule endoscopy was to be considered pending results. CT enterography was performed on 6/29/2023, which showed an 8.7 cm area of masslike thickening involving a loop of small bowel within the right lower quadrant. There was an additional 7 cm area of masslike thickening involving a loop of small bowel within the left lower quadrant. These findings were felt to be suspicious of metastatic disease to small bowel given best history of melanoma.     S/p en bloc small bowel [facility] 6[facility]30/23 - two separate foci of metastatic melanoma, 7.5 cm and 6.5 cm, BRAF mutant.      S/p cycle 4 Ipilimumab 3 mg/Nivolumab 1 mg.  Tolerated very well.  Feels well.       Currently s/p cycle 8 single agent nivolumab     Fatigue:  Yes, mild    Fevers:  No    Appetite/taste  changes:  No    Mucositis:  No    Weight changes:  No    Nausea/vomiting:  No    Diarrhea:  No    Constipation:  No    Peripheral neuropathy:  No       He had removal of L salivary gland stone removed by ENT on 10/2/2023.    Was planning on going back home to [place] for tx however no appts are set up.   He now reports that he will likely be coming back here for tx q 2 weeks.     ECOG PS 0    Review of Systems:   Review of Systems   Respiratory:  Negative for cough and shortness of breath.    Cardiovascular:  Negative for chest pain, leg swelling and palpitations.   Gastrointestinal:  Negative for abdominal pain and blood in stool.   Genitourinary:  Negative for difficulty urinating, dysuria, frequency and hematuria.    Musculoskeletal:  Negative for arthralgias (on and off at different joints) and back pain.   Skin:  Negative for rash.   Neurological:  Negative for dizziness and headaches.   Hematological:  Negative for adenopathy. Does not bruise/bleed easily.   Psychiatric/Behavioral:  Negative for sleep disturbance.          No current outpatient medications on file.     Allergies:   No Known Allergies    Past Medical History:   Diagnosis Date    Anemia     with multiple blood transfusion    GIB (gastrointestinal bleeding) 12/2022    Melanoma (HCC) 06/01/2021    right shoulder s/p surgical excision and skin graft, adjuvant BRAF/MEK inhibitors for 1.5 yrs    Personal history of antineoplastic chemotherapy      Past Surgical History:   Procedure Laterality Date    OTHER SURGICAL HISTORY      small intestine surgery    UPPER GI ENDOSCOPY,EXAM  12/01/2022     Social History     Socioeconomic History    Marital status: Single   Tobacco Use    Smoking status: Never    Smokeless tobacco: Never   Vaping Use    Vaping Use: Never used   Substance and Sexual Activity    Alcohol use: Not Currently    Drug use: Never       No family history on file.      PHYSICAL EXAM:    /74 (BP Location: Left arm, Patient  Position: Sitting, Cuff Size: large)   Pulse 65   Temp 98.4 °F (36.9 °C) (Oral)   Resp 16   Ht 1.727 m (5' 8\")   Wt 100.7 kg (222 lb)   SpO2 97%   BMI 33.75 kg/m²   Wt Readings from Last 6 Encounters:   12/21/23 100.2 kg (221 lb)   12/06/23 100 kg (220 lb 6.4 oz)   11/09/23 98.5 kg (217 lb 3.2 oz)   10/27/23 96.3 kg (212 lb 6.4 oz)   10/23/23 96.6 kg (213 lb)   10/09/23 96.6 kg (213 lb)     Physical Exam  General: Patient is alert, not in acute distress.    HEENT: EOMs intact. PERRL, MMM.   Neck: No JVD. No palpable lymphadenopathy. Neck is supple, surgical scar.  Chest: Clear to auscultation.  Heart: Regular rate and rhythm.   Abdomen: Soft, non tender with good bowel sounds.    Extremities: No edema.  Neurological: Grossly intact.   Psych/Depression: nl        ASSESSMENT/PLAN:     1. Malignant melanoma of right upper extremity including shoulder (HCC)    2. 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 Encounter for antineoplastic immunotherapy          1. Malignant melanoma of right upper extremity including shoulder (HCC)  Proceed with treatment with nivolumab and ipilimumab x 4 cycles followed by maintenance nivolumab for total of 18 to 24 months.  Discussed goal of treatment is still potentially curative with these agents.      S/p cycle 4 Ipilimumab 3 mg/Nivolumab 1 mg.      Tumor assessment after the fourth cycle of treatment with marked response to therapy.    Currently s/p cycle 8 single agent nivolumab. Tolerated well.     Proceed with cycle 9 -    maintenance nivolumab at 3 mg/kg every 2 weeks.  This will be to complete a total of 18 months to 24 months of treatment from initiation of therapy.  He is working on transfering care back to [place] but continue with tx with our team in the meantime until all is set up.     He will have repeat PET/CT in 3 months.  This will be in January 2024. FU with Dr. Blanco thereafter. Pt to schedule     Patient is tolerating very well.      2. RLE with pain and  discomfort.    Imaging c/w a Baker's cyst      No orders of the defined types were placed in this encounter.    Call prn.      MDM: high, malignancy, life threatening. Drug therapy requiring intensive monitoring for tox          Results From Past 48 Hours:  Recent Results (from the past 48 hour(s))   Comp Metabolic Panel (14)    Collection Time: 01/03/24  1:09 PM   Result Value Ref Range    Glucose 136 (H) 70 - 99 mg/dL    Sodium 138 136 - 145 mmol/L    Potassium 4.0 3.5 - 5.1 mmol/L    Chloride 105 98 - 112 mmol/L    CO2 27.0 21.0 - 32.0 mmol/L    Anion Gap 6 0 - 18 mmol/L    BUN 15 9 - 23 mg/dL    Creatinine 0.82 0.70 - 1.30 mg/dL    BUN/CREA Ratio 18.3 10.0 - 20.0    Calcium, Total 9.3 8.7 - 10.4 mg/dL    Calculated Osmolality 289 275 - 295 mOsm/kg    eGFR-Cr 102 >=60 mL/min/1.73m2    ALT 18 10 - 49 U/L    AST 15 <=34 U/L    Alkaline Phosphatase 97 45 - 117 U/L    Bilirubin, Total 0.7 0.3 - 1.2 mg/dL    Total Protein 7.6 5.7 - 8.2 g/dL    Albumin 4.4 3.2 - 4.8 g/dL    Globulin  3.2 2.8 - 4.4 g/dL    A/G Ratio 1.4 1.0 - 2.0    Patient Fasting for CMP? Patient not present    CBC W/ DIFFERENTIAL    Collection Time: 01/03/24  1:09 PM   Result Value Ref Range    WBC 7.0 4.0 - 11.0 x10(3) uL    RBC 4.88 4.30 - 5.70 x10(6)uL    HGB 14.7 13.0 - 17.5 g/dL    HCT 43.4 39.0 - 53.0 %    MCV 88.9 80.0 - 100.0 fL    MCH 30.1 26.0 - 34.0 pg    MCHC 33.9 31.0 - 37.0 g/dL    RDW-SD 50.4 (H) 35.1 - 46.3 fL    RDW 15.4 (H) 11.0 - 15.0 %    .0 150.0 - 450.0 10(3)uL    Neutrophil Absolute Prelim 3.88 1.50 - 7.70 x10 (3) uL    Neutrophil Absolute 3.88 1.50 - 7.70 x10(3) uL    Lymphocyte Absolute 2.19 1.00 - 4.00 x10(3) uL    Monocyte Absolute 0.80 0.10 - 1.00 x10(3) uL    Eosinophil Absolute 0.07 0.00 - 0.70 x10(3) uL    Basophil Absolute 0.08 0.00 - 0.20 x10(3) uL    Immature Granulocyte Absolute 0.01 0.00 - 1.00 x10(3) uL    Neutrophil % 55.2 %    Lymphocyte % 31.2 %    Monocyte % 11.4 %    Eosinophil % 1.0 %    Basophil %  1.1 %    Immature Granulocyte % 0.1 %               Imaging & Referrals:  None   No orders of the defined types were placed in this encounter.  PROCEDURE: PET/CT (NON-STAND) SCANS(SKULL TO TOES) (CPT=78816)     COMPARISON: [facility], PET (NON-STAND) SCANS(SKULL TO TOES) (CPT=78816), 8/01/2023, 7:28 AM.     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  Right upper extremity melanoma with metastasis to mesenteric lymph nodes.     TECHNIQUE: After obtaining the patient's consent, 12.7 millicuries of F-18 FDG was administered into the left antecubital vein.  Whole body PET/CT imaging was performed of the entire body, skull to feet, with multi-planar imaging without oral or  intravenous contrast material, using a dedicated integrated PET/CT scanner.       PATIENT BLOOD GLUCOSE: 118 mg/dL.    UPTAKE TIME:  61 minutes for torso.     FINDINGS:  Attenuation corrected and non-attenuation corrected images were reviewed from the head to the feet.     Mediastinal blood pool is 2.31 max SUV, and hepatic blood pool is 3.00 max SUV.  HEAD/NECK: Normal.  No pathological FDG activity.  LUNGS: Normal.  No pathological FDG activity.  No suspicious nodules on CT imaging.  MEDIASTINUM/KIERSTEN: Normal.  No pathological FDG activity.    CHEST WALL/AXILLA: Mildly prominent right axillary lymph node has maximum SUV of 1.59 with normal morphology.  This is favored reactive.  No pathological FDG activity.    ABDOMEN/ PELVIS: Previously noted hypermetabolic lymph nodes have markedly decreased in size.  The largest measures 2.2 x 1.6 cm series 2, image 140, previously 3.5 x 3.7 cm.  The maximum SUV is 3.73, previously 12.53. The intermediate measures 2.0 x 1.0   cm series 2, image 137, previously 3.7 x 1.5 cm.  The maximum SUV is 2.44, previously 12.52.  The smallest measures 1.4 x 0.7 cm series 2, image 140, previously 1.4 x  1.2 cm.  The maximum SUV is 1.60, previously 5.25.    MUSCULOSKELETAL: Minimally elevated activity at the L1 and L2 spinous processes is favored secondary to hypertrophic degenerative changes.  No pathological FDG activity.  No suspicious lesions on CT imaging.  EXTREMITIES: Postsurgical changes of right upper extremity soft tissue resection.  No pathological FDG activity.  No suspicious lesions on CT imaging.  OTHER: Negative.                Impression  CONCLUSION:     There is a history of right upper extremity melanoma with metastasis.  Hypermetabolic lymph nodes at the mesenteric root have decreased in size and metabolic activity suggesting treatment response.  No other evidence of metastatic or recurrent disease.           Dictated by (CST): Mando Gaines MD on 10/18/2023 at 9:52 AM      Finalized by (CST): Mando Gaines MD on 10/18/2023 at 10:48 AM

## 2024-01-03 NOTE — PROGRESS NOTES
Pt here for C9D1 Drug(s)Opdivo.  Arrives Ambulating independently, accompanied by Self     Patient was evaluated today by MD.    Oral medications included in this regimen:  no    Patient confirms comprehension of cancer treatment schedule:  yes    Pregnancy screening:  Not applicable    Modifications in dose or schedule: No.     Medications appearance and physical integrity checked by RN: yes.    Chemotherapy IV pump settings verified by 2 RNs:  No due to targeted therapy IV administration.  Frequency of blood return and site check throughout administration: Prior to administration and At completion of therapy     Infusion/treatment outcome:  patient tolerated treatment without incident    Education Record    Learner:  Family Member  Barriers / Limitations:  Language   Method:  Discussion  Education / instructions given:  Plan of care  Outcome:  Shows understanding    Discharged Home, Ambulating independently, accompanied by:Self    Patient/family verbalized understanding of future appointments: by printed AVS

## 2024-01-09 ENCOUNTER — HOSPITAL ENCOUNTER (OUTPATIENT)
Dept: NUCLEAR MEDICINE | Facility: HOSPITAL | Age: 59
Discharge: HOME | End: 2024-01-09
Attending: INTERNAL MEDICINE
Payer: MEDICAID

## 2024-01-09 DIAGNOSIS — C43.61 MALIGNANT MELANOMA OF RIGHT UPPER EXTREMITY INCLUDING SHOULDER (HCC): ICD-10-CM

## 2024-01-09 DIAGNOSIS — C78.89 MELANOMA METASTATIC TO DIGESTIVE ORGAN (HCC): ICD-10-CM

## 2024-01-09 LAB — GLUCOSE BLDC GLUCOMTR-MCNC: 116 MG/DL (ref 70–99)

## 2024-01-09 PROCEDURE — 78816 PET IMAGE W/CT FULL BODY: CPT | Performed by: INTERNAL MEDICINE

## 2024-01-09 PROCEDURE — 82962 GLUCOSE BLOOD TEST: CPT

## 2024-01-17 ENCOUNTER — OFFICE VISIT (OUTPATIENT)
Dept: HEMATOLOGY/ONCOLOGY | Facility: HOSPITAL | Age: 59
End: 2024-01-17
Attending: INTERNAL MEDICINE
Payer: MEDICAID

## 2024-01-17 ENCOUNTER — TELEPHONE (OUTPATIENT)
Dept: HEMATOLOGY/ONCOLOGY | Facility: HOSPITAL | Age: 59
End: 2024-01-17

## 2024-01-17 VITALS
DIASTOLIC BLOOD PRESSURE: 80 MMHG | OXYGEN SATURATION: 97 % | TEMPERATURE: 98 F | WEIGHT: 221 LBS | BODY MASS INDEX: 34 KG/M2 | RESPIRATION RATE: 16 BRPM | SYSTOLIC BLOOD PRESSURE: 125 MMHG | HEART RATE: 57 BPM

## 2024-01-17 DIAGNOSIS — C43.61 MALIGNANT MELANOMA OF RIGHT UPPER EXTREMITY INCLUDING SHOULDER (HCC): Primary | ICD-10-CM

## 2024-01-17 DIAGNOSIS — Z51.12 ENCOUNTER FOR ANTINEOPLASTIC IMMUNOTHERAPY: ICD-10-CM

## 2024-01-17 DIAGNOSIS — C78.89 MELANOMA METASTATIC TO DIGESTIVE ORGAN (HCC): ICD-10-CM

## 2024-01-17 LAB
ALBUMIN SERPL-MCNC: 4.5 G/DL (ref 3.2–4.8)
ALBUMIN/GLOB SERPL: 1.4 {RATIO} (ref 1–2)
ALP LIVER SERPL-CCNC: 90 U/L
ALT SERPL-CCNC: 23 U/L
ANION GAP SERPL CALC-SCNC: 10 MMOL/L (ref 0–18)
AST SERPL-CCNC: 22 U/L (ref ?–34)
BASOPHILS # BLD AUTO: 0.1 X10(3) UL (ref 0–0.2)
BASOPHILS NFR BLD AUTO: 1.6 %
BILIRUB SERPL-MCNC: 0.7 MG/DL (ref 0.3–1.2)
BUN BLD-MCNC: 20 MG/DL (ref 9–23)
BUN/CREAT SERPL: 23 (ref 10–20)
CALCIUM BLD-MCNC: 9.4 MG/DL (ref 8.7–10.4)
CHLORIDE SERPL-SCNC: 105 MMOL/L (ref 98–112)
CO2 SERPL-SCNC: 24 MMOL/L (ref 21–32)
CREAT BLD-MCNC: 0.87 MG/DL
DEPRECATED RDW RBC AUTO: 51.8 FL (ref 35.1–46.3)
EGFRCR SERPLBLD CKD-EPI 2021: 100 ML/MIN/1.73M2 (ref 60–?)
EOSINOPHIL # BLD AUTO: 0.08 X10(3) UL (ref 0–0.7)
EOSINOPHIL NFR BLD AUTO: 1.3 %
ERYTHROCYTE [DISTWIDTH] IN BLOOD BY AUTOMATED COUNT: 15.4 % (ref 11–15)
GLOBULIN PLAS-MCNC: 3.3 G/DL (ref 2.8–4.4)
GLUCOSE BLD-MCNC: 177 MG/DL (ref 70–99)
HCT VFR BLD AUTO: 45.8 %
HGB BLD-MCNC: 15.4 G/DL
IMM GRANULOCYTES # BLD AUTO: 0.02 X10(3) UL (ref 0–1)
IMM GRANULOCYTES NFR BLD: 0.3 %
LYMPHOCYTES # BLD AUTO: 2.41 X10(3) UL (ref 1–4)
LYMPHOCYTES NFR BLD AUTO: 39.6 %
MCH RBC QN AUTO: 30.7 PG (ref 26–34)
MCHC RBC AUTO-ENTMCNC: 33.6 G/DL (ref 31–37)
MCV RBC AUTO: 91.4 FL
MONOCYTES # BLD AUTO: 0.57 X10(3) UL (ref 0.1–1)
MONOCYTES NFR BLD AUTO: 9.4 %
NEUTROPHILS # BLD AUTO: 2.9 X10 (3) UL (ref 1.5–7.7)
NEUTROPHILS # BLD AUTO: 2.9 X10(3) UL (ref 1.5–7.7)
NEUTROPHILS NFR BLD AUTO: 47.8 %
OSMOLALITY SERPL CALC.SUM OF ELEC: 295 MOSM/KG (ref 275–295)
PLATELET # BLD AUTO: 269 10(3)UL (ref 150–450)
POTASSIUM SERPL-SCNC: 4.6 MMOL/L (ref 3.5–5.1)
PROT SERPL-MCNC: 7.8 G/DL (ref 5.7–8.2)
RBC # BLD AUTO: 5.01 X10(6)UL
SODIUM SERPL-SCNC: 139 MMOL/L (ref 136–145)
T4 FREE SERPL-MCNC: 1.1 NG/DL (ref 0.8–1.7)
TSI SER-ACNC: 2.67 MIU/ML (ref 0.55–4.78)
WBC # BLD AUTO: 6.1 X10(3) UL (ref 4–11)

## 2024-01-17 PROCEDURE — 84443 ASSAY THYROID STIM HORMONE: CPT

## 2024-01-17 PROCEDURE — 84439 ASSAY OF FREE THYROXINE: CPT

## 2024-01-17 PROCEDURE — 80053 COMPREHEN METABOLIC PANEL: CPT

## 2024-01-17 PROCEDURE — 85025 COMPLETE CBC W/AUTO DIFF WBC: CPT

## 2024-01-17 PROCEDURE — 96413 CHEMO IV INFUSION 1 HR: CPT

## 2024-01-17 PROCEDURE — 99215 OFFICE O/P EST HI 40 MIN: CPT | Performed by: INTERNAL MEDICINE

## 2024-01-17 NOTE — PROGRESS NOTES
HPI   Cole Kendrick is a 58 year old male here for follow up of   Encounter Diagnoses   Name Primary?    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    Oncology history:   Extensive outside records from multiple facilities since March 2021 were reviewed.  This is a synopsis of those records, details can be found on Care Everywhere.  Patient had presented at [facility] in [facility] March 2021 due to a mass of the right shoulder.  At that time he underwent imaging with an MRI which showed a superficial right upper arm mass which appeared to be veins within the dermis.  It measured 3.9 cm x 8.3 cm x 5.9 cm it was felt that there was likely internal process of hemorrhage within the mass.  The mass demonstrated significant enhancement and it extended to the involved adjacent thickened areas of the dermis.  This mass was abutting the deltoid muscle and mildly effacing the deltoid muscle but not invading it.  There is also evidence of 13 mm short axis diameter right axillary lymph node which appeared to have a thickened cortex and was felt to be abnormal.  Dedicated ultrasound of the right axilla was recommended for further evaluation.  There were other smaller lymph nodes adjacent to the area.  Patient subsequently underwent right axillary lymph node ultrasound guided biopsy, the area contained multiple and large lymph nodes which were measuring between 3.5 and 3.2 cm.  There is at least 3 of them.  Pathology report showed lymph node tissue showing features consistent with nonspecific reactive lymphoid hyperplasia, there is no evidence of metastatic disease, nor a lymphoproliferative process or malignancy.     Patient was then referred to cancer center at [facility] in [facility], and was evaluated by her surgical oncologist Dr. Fabián Dawn.  Per review of his  consultation report, the patient had stated he had noted a new (mole) on the right arm 9 months prior to his original presentation and had enlarged over several months and began to bleed.  It was not painful and was not causing any neuromuscular issues.  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  We did discuss causes the results of the MRI above.  I was discussed for the patient to proceed with a radical resection of the right upper arm mass with placement of the wound VAC after performing the wide excision of the mass.  Surgical resection was performed on 5/26/2021 with en bloc resection of 12 cm with partial resection of the underlying right deltoid muscle.  Final wound measured 15 cm in diameter by 4 cm deep, and a wound VAC in place.  The pathology report showed that this was consistent with a malignant melanoma that measured 10.5 cm and was focally in the epidermis abutting ulcer with minute focus of possible melanoma in situ.  BRAF V600E was positive.  As the tumor staging summary, the histologic type was not determined.  The Breslow depth could not be determined.  There was ulceration present.  The mitotic rate was greater than 1/millimeter square.  Lymphovascular invasion was present.  Tumor infiltrating lymphocytes were present but nonbrisk.  There is no tumor regression noted.  The peripheral margins, and the deep margins were negative for invasive melanoma.  Tumor was 15 mm from the closest peripheral margin and 10 mm from the closest deep margin.  Plastic surgery was consulted for wound closure with skin grafting.  Patient underwent skin grafting on 6/8/2021.  Donor site was from the right thigh.     Patient was then seeking oncologic care in [facility] in [facility].  The records from that organization are not available on Care  Everywhere.  The patient states that he was receiving treatment with the local oncologist, Dr. Arce, with about 8 pills a day he was taking twice a day.  He states that he would pick them up at his doctor's office.  He states that he had had a PET scan which is showed positive lymph nodes in his right axilla.  On records available in care everywhere on 3/3/2022, there was a CT scan of the chest, abdomen and pelvis with contrast, which was compared to a PET/CT from September 16, 2021.  This states that on the prior PET/CT from September 2021 there had been enlarged right axillary lymphadenopathy that had measured 2.6 x 2.1 cm, and on the current study from March 3, 2022 it had resolved.  There was no other evidence seen of metastatic disease.  There was a 1 cm flash enhancing lesion in the right anterior lobe of the liver which was felt to be a hemangioma.     Then on July 25, 2022, it was noted that the patient had iron deficiency anemia.  At that time, on the note from the infusion center on 9/8/2022, at which time the patient was receiving Venofer infusions, the only medication listed on his med list was tramadol.  He did receive infusion of 300 mg of Venofer x6 doses as an outpatient.  Last dose was given on 10/27/2022.     On November 7, 2022, the patient received a blood transfusion for hemoglobin of 6.2, transfusion occurred on 11/8/2022.  Patient was then admitted on 12/21/2023 and discharged on 12/23/2023 to [facility], due to secondary acute anemia from blood loss secondary to GI bleed.  At that time he had presented with several weeks of dizziness, weakness, and left lower extremity swelling.  His oncologist recommended he go to the ED for evaluation.  He did a left lower extremity Doppler which was negative for DVT.  Hemoglobin at the time was 3.2.  Patient had complained of several weeks of painless red-maroon or black-colored stools which were not often.  He denied any abdominal  pain.  He denied any nausea or vomiting.  Patient received a total of 6 units of packed red cells and underwent an EGD which revealed 5 gastric ulcers with no active bleeding.  He was placed on 3 months of PPI therapy twice daily and was recommended for repeat EGD in 2 months.  At that time, the only medication listed was metformin.   Patient was then readmitted on 1/4/2023 and discharged on 1/6/2023, due to a recurrent GI bleed.  At that time, he had reported an abrupt onset of a sensation of fatigue, diaphoresis and dizziness, and then started having dark stools.  When he presented to the ED, with hypotension and hemoglobin of 8.1.  There was concern for recurrent GI bleed, however there is no evidence of overt GI bleed.  He was transfused 2 units of packed red cells, with hemoglobin up to 9.3 on day of discharge.  It was not recommended to repeat endoscopy at the time.  He was recommended to continue with the pantoprazole twice daily.  He had also had a tagged red cell scan during the first admission which was negative.       Patient presented back to his oncologist on 01/11/2023, with a hemoglobin of 6.5.  On 1/12/2023 he was then transfused 2 units of packed red cells at the outpatient infusion center at [facility].  Subsequent hemoglobin on 1/19/2023 was 7.1.  She did have repeat iron studies on 1/23/2023, with a ferritin of 12, percent iron saturation of 4%, LDH elevated to 364, with a total bilirubin of 0.4.  He again was managed with Venofer 300 mg infusions weekly x9 weeks.  With last dose administered on 5/4/2023.     Patient then had PET/CT on 2/02/2023, which at that time showed 2 thickened hypermetabolic small bowel loops with SUV max of 11.07 and 13.72.  They have felt that these findings were suspicious for an intussusception and a CT scan of the abdomen pelvis was recommended with and without contrast.  They still noted that there was no right axillary lymphadenopathy.     Patient was then  readmitted on 5/17/2023 through 5/18/2023, again due to acute on chronic blood loss anemia and received 2 units of packed red cells.  It was suspected to be an upper GI bleed and was referred for outpatient capsule endoscopy.  At the time of presentation on 5/17/2023 he had offered a 4-day history of black stools and dizziness in the morning.  When he presented to the ED his hemoglobin was 5.7.  After transfusion of 2 units of packed red cells his hemoglobin was up to 7.1.  Last hemoglobin on file as outpatient on 5/25/2023 was 8.4 with ferritin of 11.     Presented to [facility] ER on 6/28/23 with c/o weakness, epigastric pain and melanotic stools.  Hgb 4.6.  PMH c/w DM, gastric ulcers with h/o GI bleed s/p IV iron.  He underwent an EGD which did not show any evidence of upper GI bleeding. There were 2 small superficial ulcers/erosions in the duodenal bulb which were not felt to be the cause of the profound anemia with hemoglobin of less than 5 on admission. Patient was recommended to have a CT enterography due to PET/CT on 2/2/2023 with 2 thickened hypermetabolic masses and small bowel which were felt to be secondary to intussusception. Capsule endoscopy was to be considered pending results. CT enterography was performed on 6/29/2023, which showed an 8.7 cm area of masslike thickening involving a loop of small bowel within the right lower quadrant. There was an additional 7 cm area of masslike thickening involving a loop of small bowel within the left lower quadrant. These findings were felt to be suspicious of metastatic disease to small bowel given best history of melanoma.     S/p en bloc small bowel resection and partial omentectomy ([facility]) on 6/30/23 - two separate foci of metastatic melanoma, 7.5 cm and 6.5 cm, BRAF mutant.      S/p cycle 4 Ipilimumab 3 mg/Nivolumab 1 mg.  Tolerated very well.  Feels well.       Currently s/p cycle 9 single agent nivolumab     Fatigue:  No    Fevers:  No    Appetite/taste  changes:  No    Mucositis:  No    Weight changes:  No    Nausea/vomiting:  No    Diarrhea:  No    Constipation:  No    Peripheral neuropathy:  No     Was planning on going back home to [place] for tx however no appts are set up.   He now reports that he will likely be coming back here for tx q 2 weeks.     ECOG PS 0    Review of Systems:   Review of Systems   Respiratory:  Negative for cough and shortness of breath.    Cardiovascular:  Negative for chest pain, leg swelling and palpitations.   Gastrointestinal:  Negative for abdominal pain and blood in stool.   Genitourinary:  Negative for difficulty urinating, dysuria, frequency and hematuria.    Musculoskeletal:  Negative for arthralgias (on and off at different joints) and back pain.   Skin:  Negative for rash.   Neurological:  Negative for dizziness and headaches.   Hematological:  Negative for adenopathy. Does not bruise/bleed easily.   Psychiatric/Behavioral:  Negative for sleep disturbance.          No current outpatient medications on file.     Allergies:   No Known Allergies    Past Medical History:   Diagnosis Date    Anemia     with multiple blood transfusion    GIB (gastrointestinal bleeding) 12/2022    Melanoma (HCC) 06/01/2021    right shoulder s/p surgical excision and skin graft, adjuvant BRAF/MEK inhibitors for 1.5 yrs    Personal history of antineoplastic chemotherapy      Past Surgical History:   Procedure Laterality Date    OTHER SURGICAL HISTORY      small intestine surgery    UPPER GI ENDOSCOPY,EXAM  12/01/2022     Social History     Socioeconomic History    Marital status: Single   Tobacco Use    Smoking status: Never    Smokeless tobacco: Never   Vaping Use    Vaping Use: Never used   Substance and Sexual Activity    Alcohol use: Not Currently    Drug use: Never       History reviewed. No pertinent family history.      PHYSICAL EXAM:    /80 (BP Location: Left arm, Patient Position: Sitting, Cuff Size: large)   Pulse 57   Temp 97.9  °F (36.6 °C) (Oral)   Resp 16   Wt 100.2 kg (221 lb)   SpO2 97%   BMI 33.60 kg/m²   Wt Readings from Last 6 Encounters:   01/17/24 100.2 kg (221 lb)   01/03/24 100.7 kg (222 lb)   12/21/23 100.2 kg (221 lb)   12/06/23 100 kg (220 lb 6.4 oz)   11/09/23 98.5 kg (217 lb 3.2 oz)   10/27/23 96.3 kg (212 lb 6.4 oz)     Physical Exam  General: Patient is alert, not in acute distress.    HEENT: EOMs intact. PERRL, MMM.   Neck: No JVD. No palpable lymphadenopathy. Neck is supple, surgical scar.  Chest: Clear to auscultation.  Heart: Regular rate and rhythm.   Abdomen: Soft, non tender with good bowel sounds.    Extremities: No edema.  Neurological: Grossly intact.   Psych/Depression: nl        ASSESSMENT/PLAN:     1. Malignant melanoma of right upper extremity including shoulder (HCC)    2. 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 Encounter for antineoplastic immunotherapy          1. Malignant melanoma of right upper extremity including shoulder (HCC)  Proceed with treatment with nivolumab and ipilimumab x 4 cycles followed by maintenance nivolumab for total of 18 to 24 months.  Discussed goal of treatment is still potentially curative with these agents.      S/p cycle 4 Ipilimumab 3 mg/Nivolumab 1 mg.      Tumor assessment after the fourth cycle of treatment with marked response to therapy.  Tumor assessment after cycle 10 with very minimal activity in the LN in the abdomen.    Currently s/p cycle 9 single agent nivolumab. Tolerated well.       Proceed with cycle 10 -    maintenance nivolumab at 3 mg/kg every 2 weeks.  This will be to complete a total of 18 months to 24 months of treatment from initiation of therapy.  He is working on transfering care back to [place] but continue with tx with our team in the meantime until all is set up.     He will have repeat PET/CT in 3 months.  This will be in April 2024.     Patient is tolerating very well.          No orders of the defined types were placed in this  encounter.    Call prn.      MDM: high, malignancy, life threatening. Drug therapy requiring intensive monitoring for tox          Results From Past 48 Hours:  Recent Results (from the past 48 hour(s))   Comp Metabolic Panel (14)    Collection Time: 01/17/24  8:44 AM   Result Value Ref Range    Glucose 177 (H) 70 - 99 mg/dL    Sodium 139 136 - 145 mmol/L    Potassium 4.6 3.5 - 5.1 mmol/L    Chloride 105 98 - 112 mmol/L    CO2 24.0 21.0 - 32.0 mmol/L    Anion Gap 10 0 - 18 mmol/L    BUN 20 9 - 23 mg/dL    Creatinine 0.87 0.70 - 1.30 mg/dL    BUN/CREA Ratio 23.0 (H) 10.0 - 20.0    Calcium, Total 9.4 8.7 - 10.4 mg/dL    Calculated Osmolality 295 275 - 295 mOsm/kg    eGFR-Cr 100 >=60 mL/min/1.73m2    ALT 23 10 - 49 U/L    AST 22 <=34 U/L    Alkaline Phosphatase 90 45 - 117 U/L    Bilirubin, Total 0.7 0.3 - 1.2 mg/dL    Total Protein 7.8 5.7 - 8.2 g/dL    Albumin 4.5 3.2 - 4.8 g/dL    Globulin  3.3 2.8 - 4.4 g/dL    A/G Ratio 1.4 1.0 - 2.0    Patient Fasting for CMP? Patient not present    TSH [E]    Collection Time: 01/17/24  8:44 AM   Result Value Ref Range    TSH 2.668 0.550 - 4.780 mIU/mL   T4 FREE [E]    Collection Time: 01/17/24  8:44 AM   Result Value Ref Range    Free T4 1.1 0.8 - 1.7 ng/dL   CBC W/ DIFFERENTIAL    Collection Time: 01/17/24  8:44 AM   Result Value Ref Range    WBC 6.1 4.0 - 11.0 x10(3) uL    RBC 5.01 4.30 - 5.70 x10(6)uL    HGB 15.4 13.0 - 17.5 g/dL    HCT 45.8 39.0 - 53.0 %    MCV 91.4 80.0 - 100.0 fL    MCH 30.7 26.0 - 34.0 pg    MCHC 33.6 31.0 - 37.0 g/dL    RDW-SD 51.8 (H) 35.1 - 46.3 fL    RDW 15.4 (H) 11.0 - 15.0 %    .0 150.0 - 450.0 10(3)uL    Neutrophil Absolute Prelim 2.90 1.50 - 7.70 x10 (3) uL    Neutrophil Absolute 2.90 1.50 - 7.70 x10(3) uL    Lymphocyte Absolute 2.41 1.00 - 4.00 x10(3) uL    Monocyte Absolute 0.57 0.10 - 1.00 x10(3) uL    Eosinophil Absolute 0.08 0.00 - 0.70 x10(3) uL    Basophil Absolute 0.10 0.00 - 0.20 x10(3) uL    Immature Granulocyte Absolute 0.02 0.00  - 1.00 x10(3) uL    Neutrophil % 47.8 %    Lymphocyte % 39.6 %    Monocyte % 9.4 %    Eosinophil % 1.3 %    Basophil % 1.6 %    Immature Granulocyte % 0.3 %     PET (NON-STAND)SCANS(SKULL TO TOES EX:MELANOMA)(CPT=78816) [[id number]] Collected: 01/09/24 1303  Order Status: Completed Updated: 01/09/24 1304  Narrative:    PROCEDURE: PET/CT (NON-STAND) SCANS(SKULL TO TOES) (CPT=78816)     COMPARISON: [facility], PET (NON-STAND) SCANS(SKULL TO TOES) (CPT=78816), 8/01/2023, 7:28 AM.  [facility], PET (NON-STAND) SCANS(SKULL TO TOES) (CPT=78816), 10/18/2023, 7:29 AM.     INDICATIONS: Subsequent treatment strategy.  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  Whole body PET/CT imaging was performed of the entire body, skull to feet, with multi-planar imaging without oral or  intravenous contrast material, using a dedicated integrated PET/CT scanner.       PATIENT BLOOD GLUCOSE: 116 mg/dL.    UPTAKE TIME: 66 minutes for whole body imaging     FINDINGS:  Attenuation corrected and non-attenuation corrected images were reviewed from the head to the feet.  Mediastinal blood pool is 2.0 max SUV, and hepatic blood pool is 2.9 max SUV.     HEAD/NECK: Left submandibular gland has been removed or is atrophic.  No pathological FDG activity.  LUNGS: No pathological FDG activity.  No suspicious nodules on CT imaging.  MEDIASTINUM/KIERSTEN: No lymphadenopathy.  No pathological FDG activity.    CHEST WALL/AXILLA: No lymphadenopathy.  No pathological FDG activity.    ABDOMEN/ PELVIS: Prior small bowel resection in the right lower quadrant with reanastomosis.  No obstruction or inflammation the surgical site.  12 x 17 mm (currently the 2.2 max SUV, previously 16 x 22 mm and 3.7 max SUV)  ovoid solid nodule in the  central aspect of the abdomen at the level the umbilicus.  There is a 6 x 10 mm (currently 0.9 max SUV, previously 12 x 10 mm and 2.4 max SUV) lymph node in the central abdominal mesentery slightly left of midline.  Previous exam also demonstrated a  smaller 7 x 14-mm lymph node which is no longer present.  Cortical based low density foci in the lower pole of the right kidney compatible with cysts.  Small bilateral fat containing inguinal hernias.  MUSCULOSKELETAL: Scattered mild degenerative change within the spine.  No pathological FDG activity.  No suspicious lesions on CT imaging.  LOWER EXTREMITIES: Degenerative changes in both knees.  Small left knee effusion and moderate-sized right knee effusion.  No pathological FDG activity.  No suspicious lesions on CT imaging.  Postprocedural changes and scar are present over the lateral  aspect of the proximal humerus/shoulder.  OTHER: Negative.                Impression:    CONCLUSION:  There is history of metastatic melanoma.  Postprocedural changes in the right shoulder.  Mesentery lymphadenopathy demonstrated on prior PET-CT exams continue to decrease in size.  All nodes are less FDG avid or no longer FDG avid.  No new metastatic  foci.          Dictated by (CST): Drew Adams MD on 1/09/2024 at 12:43 PM      Finalized by (CST): Drew Adams MD on 1/09/2024 at 1:03 PM            Imaging & Referrals:  None   No orders of the defined types were placed in this encounter.

## 2024-01-17 NOTE — PROGRESS NOTES
Pt here for C10 opdivo Arrives Ambulating independently, accompanied by Family member     Patient was evaluated today by MD. Blanco    Oral medications included in this regimen:  no    Patient confirms comprehension of cancer treatment schedule:  yes    Pregnancy screening:  Not applicable    Modifications in dose or schedule:  No    Medications appearance and physical integrity checked by RN: yes.    Chemotherapy IV pump settings verified by 2 RNs:  No due to targeted therapy IV administration.  Frequency of blood return and site check throughout administration: Prior to administration and At completion of therapy     Infusion/treatment outcome:  patient tolerated treatment without incident    Education Record    Learner:  Patient and Family Member  Barriers / Limitations:  Language  Method:  Discussion  Education / instructions given:  length of infusion time, @ wait time for infusion to be made and  sent from pharmacy  Outcome:  Shows understanding    Discharged Other stable from infusion , Ambulating independently, accompanied by:Family member    Patient/family verbalized understanding of future appointments: by printed AVS

## 2024-01-17 NOTE — TELEPHONE ENCOUNTER
Patient called using language line  Sammarinese ([facility] #[id number])  Patient notified that 's office called and stated patient was currently not a patient at that office. Patient informed Dr. Blanco requesting patient to call to establish care with PCP. Patient verbalizes understanding.

## 2024-01-22 ENCOUNTER — TELEPHONE (OUTPATIENT)
Dept: HEMATOLOGY/ONCOLOGY | Facility: HOSPITAL | Age: 59
End: 2024-01-22

## 2024-01-22 NOTE — TELEPHONE ENCOUNTER
Received  JAMES from Dr. Karrie Guadarrama request from [facility] for a progress note for continuation of care. Faxed last Dr. Blanco's progress note.

## 2024-01-31 ENCOUNTER — OFFICE VISIT (OUTPATIENT)
Dept: HEMATOLOGY/ONCOLOGY | Facility: HOSPITAL | Age: 59
End: 2024-01-31
Attending: INTERNAL MEDICINE
Payer: MEDICAID

## 2024-01-31 ENCOUNTER — OFFICE VISIT (OUTPATIENT)
Dept: HEMATOLOGY/ONCOLOGY | Facility: HOSPITAL | Age: 59
End: 2024-01-31
Attending: NURSE PRACTITIONER
Payer: MEDICAID

## 2024-01-31 VITALS
HEART RATE: 56 BPM | OXYGEN SATURATION: 98 % | RESPIRATION RATE: 16 BRPM | SYSTOLIC BLOOD PRESSURE: 136 MMHG | DIASTOLIC BLOOD PRESSURE: 81 MMHG | HEIGHT: 68 IN | WEIGHT: 225.5 LBS | TEMPERATURE: 99 F | BODY MASS INDEX: 34.17 KG/M2

## 2024-01-31 DIAGNOSIS — C43.61 MALIGNANT MELANOMA OF RIGHT UPPER EXTREMITY INCLUDING SHOULDER (HCC): Primary | ICD-10-CM

## 2024-01-31 DIAGNOSIS — Z51.12 ENCOUNTER FOR ANTINEOPLASTIC IMMUNOTHERAPY: ICD-10-CM

## 2024-01-31 DIAGNOSIS — C78.89 MELANOMA METASTATIC TO DIGESTIVE ORGAN (HCC): ICD-10-CM

## 2024-01-31 LAB
ALBUMIN SERPL-MCNC: 4.3 G/DL (ref 3.2–4.8)
ALBUMIN/GLOB SERPL: 1.4 {RATIO} (ref 1–2)
ALP LIVER SERPL-CCNC: 96 U/L
ALT SERPL-CCNC: 17 U/L
ANION GAP SERPL CALC-SCNC: 8 MMOL/L (ref 0–18)
AST SERPL-CCNC: 13 U/L (ref ?–34)
BASOPHILS # BLD AUTO: 0.07 X10(3) UL (ref 0–0.2)
BASOPHILS NFR BLD AUTO: 1.3 %
BILIRUB SERPL-MCNC: 0.5 MG/DL (ref 0.3–1.2)
BUN BLD-MCNC: 19 MG/DL (ref 9–23)
BUN/CREAT SERPL: 20.9 (ref 10–20)
CALCIUM BLD-MCNC: 9.4 MG/DL (ref 8.7–10.4)
CHLORIDE SERPL-SCNC: 106 MMOL/L (ref 98–112)
CO2 SERPL-SCNC: 24 MMOL/L (ref 21–32)
CREAT BLD-MCNC: 0.91 MG/DL
DEPRECATED RDW RBC AUTO: 48.3 FL (ref 35.1–46.3)
EGFRCR SERPLBLD CKD-EPI 2021: 98 ML/MIN/1.73M2 (ref 60–?)
EOSINOPHIL # BLD AUTO: 0.08 X10(3) UL (ref 0–0.7)
EOSINOPHIL NFR BLD AUTO: 1.4 %
ERYTHROCYTE [DISTWIDTH] IN BLOOD BY AUTOMATED COUNT: 14.6 % (ref 11–15)
GLOBULIN PLAS-MCNC: 3.1 G/DL (ref 2.8–4.4)
GLUCOSE BLD-MCNC: 147 MG/DL (ref 70–99)
HCT VFR BLD AUTO: 43.2 %
HGB BLD-MCNC: 15.1 G/DL
IMM GRANULOCYTES # BLD AUTO: 0.02 X10(3) UL (ref 0–1)
IMM GRANULOCYTES NFR BLD: 0.4 %
LYMPHOCYTES # BLD AUTO: 2.01 X10(3) UL (ref 1–4)
LYMPHOCYTES NFR BLD AUTO: 36 %
MCH RBC QN AUTO: 31.6 PG (ref 26–34)
MCHC RBC AUTO-ENTMCNC: 35 G/DL (ref 31–37)
MCV RBC AUTO: 90.4 FL
MONOCYTES # BLD AUTO: 0.55 X10(3) UL (ref 0.1–1)
MONOCYTES NFR BLD AUTO: 9.8 %
NEUTROPHILS # BLD AUTO: 2.86 X10 (3) UL (ref 1.5–7.7)
NEUTROPHILS # BLD AUTO: 2.86 X10(3) UL (ref 1.5–7.7)
NEUTROPHILS NFR BLD AUTO: 51.1 %
OSMOLALITY SERPL CALC.SUM OF ELEC: 291 MOSM/KG (ref 275–295)
PLATELET # BLD AUTO: 250 10(3)UL (ref 150–450)
POTASSIUM SERPL-SCNC: 4.2 MMOL/L (ref 3.5–5.1)
PROT SERPL-MCNC: 7.4 G/DL (ref 5.7–8.2)
RBC # BLD AUTO: 4.78 X10(6)UL
SODIUM SERPL-SCNC: 138 MMOL/L (ref 136–145)
WBC # BLD AUTO: 5.6 X10(3) UL (ref 4–11)

## 2024-01-31 PROCEDURE — 96413 CHEMO IV INFUSION 1 HR: CPT

## 2024-01-31 PROCEDURE — 80053 COMPREHEN METABOLIC PANEL: CPT

## 2024-01-31 PROCEDURE — 85025 COMPLETE CBC W/AUTO DIFF WBC: CPT

## 2024-01-31 PROCEDURE — 99215 OFFICE O/P EST HI 40 MIN: CPT | Performed by: NURSE PRACTITIONER

## 2024-01-31 NOTE — PROGRESS NOTES
HPI   Cole Kendrick is a 58 year old male here for follow up of   Encounter Diagnoses   Name Primary?    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    Oncology history:   Extensive outside records from multiple facilities since March 2021 were reviewed.  This is a synopsis of those records, details can be found on Care Everywhere.  Patient had presented at [facility] in [facility] March 2021 due to a mass of the right shoulder.  At that time he underwent imaging with an MRI which showed a superficial right upper arm mass which appeared to be veins within the dermis.  It measured 3.9 cm x 8.3 cm x 5.9 cm it was felt that there was likely internal process of hemorrhage within the mass.  The mass demonstrated significant enhancement and it extended to the involved adjacent thickened areas of the dermis.  This mass was abutting the deltoid muscle and mildly effacing the deltoid muscle but not invading it.  There is also evidence of 13 mm short axis diameter right axillary lymph node which appeared to have a thickened cortex and was felt to be abnormal.  Dedicated ultrasound of the right axilla was recommended for further evaluation.  There were other smaller lymph nodes adjacent to the area.  Patient subsequently underwent right axillary lymph node ultrasound guided biopsy, the area contained multiple and large lymph nodes which were measuring between 3.5 and 3.2 cm.  There is at least 3 of them.  Pathology report showed lymph node tissue showing features consistent with nonspecific reactive lymphoid hyperplasia, there is no evidence of metastatic disease, nor a lymphoproliferative process or malignancy.     Patient was then referred to cancer center at [facility] in [facility], and was evaluated by her surgical oncologist Dr. Fabián Dawn.  Per review of his  consultation report, the patient had stated he had noted a new (mole) on the right arm 9 months prior to his original presentation and had enlarged over several months and began to bleed.  It was not painful and was not causing any neuromuscular issues.  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  We did discuss causes the results of the MRI above.  I was discussed for the patient to proceed with a radical resection of the right upper arm mass with placement of the wound VAC after performing the wide excision of the mass.  Surgical resection was performed on 5/26/2021 with en bloc resection of 12 cm with partial resection of the underlying right deltoid muscle.  Final wound measured 15 cm in diameter by 4 cm deep, and a wound VAC in place.  The pathology report showed that this was consistent with a malignant melanoma that measured 10.5 cm and was focally in the epidermis abutting ulcer with minute focus of possible melanoma in situ.  BRAF V600E was positive.  As the tumor staging summary, the histologic type was not determined.  The Breslow depth could not be determined.  There was ulceration present.  The mitotic rate was greater than 1/millimeter square.  Lymphovascular invasion was present.  Tumor infiltrating lymphocytes were present but nonbrisk.  There is no tumor regression noted.  The peripheral margins, and the deep margins were negative for invasive melanoma.  Tumor was 15 mm from the closest peripheral margin and 10 mm from the closest deep margin.  Plastic surgery was consulted for wound closure with skin grafting.  Patient underwent skin grafting on 6/8/2021.  Donor site was from the right thigh.     Patient was then seeking oncologic care in [facility] in [facility].  The records from that organization are not available on Care  Everywhere.  The patient states that he was receiving treatment with the local oncologist, Dr. Arce, with about 8 pills a day he was taking twice a day.  He states that he would pick them up at his doctor's office.  He states that he had had a PET scan which is showed positive lymph nodes in his right axilla.  On records available in care everywhere on 3/3/2022, there was a CT scan of the chest, abdomen and pelvis with contrast, which was compared to a PET/CT from September 16, 2021.  This states that on the prior PET/CT from September 2021 there had been enlarged right axillary lymphadenopathy that had measured 2.6 x 2.1 cm, and on the current study from March 3, 2022 it had resolved.  There was no other evidence seen of metastatic disease.  There was a 1 cm flash enhancing lesion in the right anterior lobe of the liver which was felt to be a hemangioma.     Then on July 25, 2022, it was noted that the patient had iron deficiency anemia.  At that time, on the note from the infusion center on 9/8/2022, at which time the patient was receiving Venofer infusions, the only medication listed on his med list was tramadol.  He did receive infusion of 300 mg of Venofer x6 doses as an outpatient.  Last dose was given on 10/27/2022.     On November 7, 2022, the patient received a blood transfusion for hemoglobin of 6.2, transfusion occurred on 11/8/2022.  Patient was then admitted on 12/21/2023 and discharged on 12/23/2023 to [facility], due to secondary acute anemia from blood loss secondary to GI bleed.  At that time he had presented with several weeks of dizziness, weakness, and left lower extremity swelling.  His oncologist recommended he go to the ED for evaluation.  He did a left lower extremity Doppler which was negative for DVT.  Hemoglobin at the time was 3.2.  Patient had complained of several weeks of painless red-maroon or black-colored stools which were not often.  He denied any abdominal  pain.  He denied any nausea or vomiting.  Patient received a total of 6 units of packed red cells and underwent an EGD which revealed 5 gastric ulcers with no active bleeding.  He was placed on 3 months of PPI therapy twice daily and was recommended for repeat EGD in 2 months.  At that time, the only medication listed was metformin.   Patient was then readmitted on 1/4/2023 and discharged on 1/6/2023, due to a recurrent GI bleed.  At that time, he had reported an abrupt onset of a sensation of fatigue, diaphoresis and dizziness, and then started having dark stools.  When he presented to the ED, with hypotension and hemoglobin of 8.1.  There was concern for recurrent GI bleed, however there is no evidence of overt GI bleed.  He was transfused 2 units of packed red cells, with hemoglobin up to 9.3 on day of discharge.  It was not recommended to repeat endoscopy at the time.  He was recommended to continue with the pantoprazole twice daily.  He had also had a tagged red cell scan during the first admission which was negative.       Patient presented back to his oncologist on 01/11/2023, with a hemoglobin of 6.5.  On 1/12/2023 he was then transfused 2 units of packed red cells at the outpatient infusion center at [facility].  Subsequent hemoglobin on 1/19/2023 was 7.1.  She did have repeat iron studies on 1/23/2023, with a ferritin of 12, percent iron saturation of 4%, LDH elevated to 364, with a total bilirubin of 0.4.  He again was managed with Venofer 300 mg infusions weekly x9 weeks.  With last dose administered on 5/4/2023.     Patient then had PET/CT on 2/02/2023, which at that time showed 2 thickened hypermetabolic small bowel loops with SUV max of 11.07 and 13.72.  They have felt that these findings were suspicious for an intussusception and a CT scan of the abdomen pelvis was recommended with and without contrast.  They still noted that there was no right axillary lymphadenopathy.     Patient was then  readmitted on 5/17/2023 through 5/18/2023, again due to acute on chronic blood loss anemia and received 2 units of packed red cells.  It was suspected to be an upper GI bleed and was referred for outpatient capsule endoscopy.  At the time of presentation on 5/17/2023 he had offered a 4-day history of black stools and dizziness in the morning.  When he presented to the ED his hemoglobin was 5.7.  After transfusion of 2 units of packed red cells his hemoglobin was up to 7.1.  Last hemoglobin on file as outpatient on 5/25/2023 was 8.4 with ferritin of 11.     Presented to [facility] ER on 6/28/23 with c/o weakness, epigastric pain and melanotic stools.  Hgb 4.6.  PMH c/w DM, gastric ulcers with h/o GI bleed s/p IV iron.  He underwent an EGD which did not show any evidence of upper GI bleeding. There were 2 small superficial ulcers/erosions in the duodenal bulb which were not felt to be the cause of the profound anemia with hemoglobin of less than 5 on admission. Patient was recommended to have a CT enterography due to PET/CT on 2/2/2023 with 2 thickened hypermetabolic masses and small bowel which were felt to be secondary to intussusception. Capsule endoscopy was to be considered pending results. CT enterography was performed on 6/29/2023, which showed an 8.7 cm area of masslike thickening involving a loop of small bowel within the right lower quadrant. There was an additional 7 cm area of masslike thickening involving a loop of small bowel within the left lower quadrant. These findings were felt to be suspicious of metastatic disease to small bowel given best history of melanoma.     S/[facility] bloc [facility] - two separate foci of metastatic melanoma, 7.5 cm and 6.5 cm, BRAF mutant.      S/p cycle 4 Ipilimumab 3 mg/Nivolumab 1 mg.  Tolerated very well.  Feels well.       Currently s/p cycle 10 single agent nivolumab, tolerating well.      Fatigue:  No    Fevers:   No    Appetite/taste changes:  No    Mucositis:  No    Weight changes:  No    Nausea/vomiting:  No    Diarrhea:  No    Constipation:  No    Peripheral neuropathy:  No     Was planning on going back home to [place] for tx however no appts are set up.   He now reports that he will likely be coming back here for tx q 2 weeks.     ECOG PS 0    Review of Systems:   Review of Systems   Respiratory:  Negative for cough and shortness of breath.    Cardiovascular:  Negative for chest pain, leg swelling and palpitations.   Gastrointestinal:  Negative for abdominal pain and blood in stool.   Genitourinary:  Negative for difficulty urinating, dysuria, frequency and hematuria.    Musculoskeletal:  Negative for arthralgias (on and off at different joints) and back pain.   Skin:  Negative for rash.   Neurological:  Negative for dizziness and headaches.   Hematological:  Negative for adenopathy. Does not bruise/bleed easily.   Psychiatric/Behavioral:  Negative for sleep disturbance.          No current outpatient medications on file.     Allergies:   No Known Allergies    Past Medical History:   Diagnosis Date    Anemia     with multiple blood transfusion    GIB (gastrointestinal bleeding) 12/2022    Melanoma (HCC) 06/01/2021    right shoulder s/p surgical excision and skin graft, adjuvant BRAF/MEK inhibitors for 1.5 yrs    Personal history of antineoplastic chemotherapy      Past Surgical History:   Procedure Laterality Date    OTHER SURGICAL HISTORY      small intestine surgery    UPPER GI ENDOSCOPY,EXAM  12/01/2022     Social History     Socioeconomic History    Marital status: Single   Tobacco Use    Smoking status: Never    Smokeless tobacco: Never   Vaping Use    Vaping Use: Never used   Substance and Sexual Activity    Alcohol use: Not Currently    Drug use: Never       No family history on file.      PHYSICAL EXAM:    /81 (BP Location: Left arm, Patient Position: Sitting, Cuff Size: large)   Pulse 56   Temp 98.7  °F (37.1 °C) (Oral)   Resp 16   Ht 1.727 m (5' 8\")   Wt 102.3 kg (225 lb 8 oz)   SpO2 98%   BMI 34.29 kg/m²   Wt Readings from Last 6 Encounters:   01/17/24 100.2 kg (221 lb)   01/03/24 100.7 kg (222 lb)   12/21/23 100.2 kg (221 lb)   12/06/23 100 kg (220 lb 6.4 oz)   11/09/23 98.5 kg (217 lb 3.2 oz)   10/27/23 96.3 kg (212 lb 6.4 oz)     Physical Exam  General: Patient is alert, not in acute distress.    HEENT: EOMs intact. PERRL, MMM.   Neck: No JVD. No palpable lymphadenopathy. Neck is supple, surgical scar.  Chest: Clear to auscultation.  Heart: Regular rate and rhythm.   Abdomen: Soft, non tender with good bowel sounds.    Extremities: No edema.  Neurological: Grossly intact.   Psych/Depression: nl        ASSESSMENT/PLAN:     1. Malignant melanoma of right upper extremity including shoulder (HCC)    2. 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 Encounter for antineoplastic immunotherapy          1. Malignant melanoma of right upper extremity including shoulder (HCC)  Proceed with treatment with nivolumab and ipilimumab x 4 cycles followed by maintenance nivolumab for total of 18 to 24 months.  Discussed goal of treatment is still potentially curative with these agents.      S/p cycle 4 Ipilimumab 3 mg/Nivolumab 1 mg.      Tumor assessment after the fourth cycle of treatment with marked response to therapy.  Tumor assessment after cycle 10 with very minimal activity in the LN in the abdomen.    Currently s/p cycle 10 single agent nivolumab. Tolerated well.       Proceed with cycle 11 -    maintenance nivolumab at 3 mg/kg every 2 weeks.  This will be to complete a total of 18 months to 24 months of treatment from initiation of therapy.  He is working on transfering care back to [place] but continue with tx with our team in the meantime until all is set up.     He will have repeat PET/CT in 3 months.  This will be in April 2024.     Patient is tolerating very well.          No orders of the defined  types were placed in this encounter.    Call prn.      MDM: high, malignancy, life threatening. Drug therapy requiring intensive monitoring for tox          Results From Past 48 Hours:  Recent Results (from the past 48 hour(s))   Comp Metabolic Panel (14)    Collection Time: 01/31/24  8:58 AM   Result Value Ref Range    Glucose 147 (H) 70 - 99 mg/dL    Sodium 138 136 - 145 mmol/L    Potassium 4.2 3.5 - 5.1 mmol/L    Chloride 106 98 - 112 mmol/L    CO2 24.0 21.0 - 32.0 mmol/L    Anion Gap 8 0 - 18 mmol/L    BUN 19 9 - 23 mg/dL    Creatinine 0.91 0.70 - 1.30 mg/dL    BUN/CREA Ratio 20.9 (H) 10.0 - 20.0    Calcium, Total 9.4 8.7 - 10.4 mg/dL    Calculated Osmolality 291 275 - 295 mOsm/kg    eGFR-Cr 98 >=60 mL/min/1.73m2    ALT 17 10 - 49 U/L    AST 13 <=34 U/L    Alkaline Phosphatase 96 45 - 117 U/L    Bilirubin, Total 0.5 0.3 - 1.2 mg/dL    Total Protein 7.4 5.7 - 8.2 g/dL    Albumin 4.3 3.2 - 4.8 g/dL    Globulin  3.1 2.8 - 4.4 g/dL    A/G Ratio 1.4 1.0 - 2.0    Patient Fasting for CMP? Patient not present    CBC W/ DIFFERENTIAL    Collection Time: 01/31/24  8:58 AM   Result Value Ref Range    WBC 5.6 4.0 - 11.0 x10(3) uL    RBC 4.78 4.30 - 5.70 x10(6)uL    HGB 15.1 13.0 - 17.5 g/dL    HCT 43.2 39.0 - 53.0 %    MCV 90.4 80.0 - 100.0 fL    MCH 31.6 26.0 - 34.0 pg    MCHC 35.0 31.0 - 37.0 g/dL    RDW-SD 48.3 (H) 35.1 - 46.3 fL    RDW 14.6 11.0 - 15.0 %    .0 150.0 - 450.0 10(3)uL    Neutrophil Absolute Prelim 2.86 1.50 - 7.70 x10 (3) uL    Neutrophil Absolute 2.86 1.50 - 7.70 x10(3) uL    Lymphocyte Absolute 2.01 1.00 - 4.00 x10(3) uL    Monocyte Absolute 0.55 0.10 - 1.00 x10(3) uL    Eosinophil Absolute 0.08 0.00 - 0.70 x10(3) uL    Basophil Absolute 0.07 0.00 - 0.20 x10(3) uL    Immature Granulocyte Absolute 0.02 0.00 - 1.00 x10(3) uL    Neutrophil % 51.1 %    Lymphocyte % 36.0 %    Monocyte % 9.8 %    Eosinophil % 1.4 %    Basophil % 1.3 %    Immature Granulocyte % 0.4 %       PET (NON-STAND)SCANS(SKULL TO  TOES EX:MELANOMA)(CPT=78816) [[id number]] Collected: 01/09/24 1303  Order Status: Completed Updated: 01/09/24 1304  Narrative:    PROCEDURE: PET/CT (NON-STAND) SCANS(SKULL TO TOES) (CPT=78816)     COMPARISON: [facility], PET (NON-STAND) SCANS(SKULL TO TOES) (CPT=78816), 8/01/2023, 7:28 AM.  [facility], PET (NON-STAND) SCANS(SKULL TO TOES) (CPT=78816), 10/18/2023, 7:29 AM.     INDICATIONS: Subsequent treatment strategy.  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  Whole body PET/CT imaging was performed of the entire body, skull to feet, with multi-planar imaging without oral or  intravenous contrast material, using a dedicated integrated PET/CT scanner.       PATIENT BLOOD GLUCOSE: 116 mg/dL.    UPTAKE TIME: 66 minutes for whole body imaging     FINDINGS:  Attenuation corrected and non-attenuation corrected images were reviewed from the head to the feet.  Mediastinal blood pool is 2.0 max SUV, and hepatic blood pool is 2.9 max SUV.     HEAD/NECK: Left submandibular gland has been removed or is atrophic.  No pathological FDG activity.  LUNGS: No pathological FDG activity.  No suspicious nodules on CT imaging.  MEDIASTINUM/KIERSTEN: No lymphadenopathy.  No pathological FDG activity.    CHEST WALL/AXILLA: No lymphadenopathy.  No pathological FDG activity.    ABDOMEN/ PELVIS: Prior small bowel resection in the right lower quadrant with reanastomosis.  No obstruction or inflammation the surgical site.  12 x 17 mm (currently the 2.2 max SUV, previously 16 x 22 mm and 3.7 max SUV) ovoid solid nodule in the  central aspect of the abdomen at the level the umbilicus.  There is a 6 x 10 mm (currently 0.9 max SUV, previously 12 x 10 mm and 2.4 max SUV) lymph node in the  central abdominal mesentery slightly left of midline.  Previous exam also demonstrated a  smaller 7 x 14-mm lymph node which is no longer present.  Cortical based low density foci in the lower pole of the right kidney compatible with cysts.  Small bilateral fat containing inguinal hernias.  MUSCULOSKELETAL: Scattered mild degenerative change within the spine.  No pathological FDG activity.  No suspicious lesions on CT imaging.  LOWER EXTREMITIES: Degenerative changes in both knees.  Small left knee effusion and moderate-sized right knee effusion.  No pathological FDG activity.  No suspicious lesions on CT imaging.  Postprocedural changes and scar are present over the lateral  aspect of the proximal humerus/shoulder.  OTHER: Negative.                Impression:    CONCLUSION:  There is history of metastatic melanoma.  Postprocedural changes in the right shoulder.  Mesentery lymphadenopathy demonstrated on prior PET-CT exams continue to decrease in size.  All nodes are less FDG avid or no longer FDG avid.  No new metastatic  foci.          Dictated by (CST): Drew Adams MD on 1/09/2024 at 12:43 PM      Finalized by (CST): Drew Adams MD on 1/09/2024 at 1:03 PM            Imaging & Referrals:  None   No orders of the defined types were placed in this encounter.

## 2024-01-31 NOTE — PROGRESS NOTES
Pt here for C11D1 Drug(s)Opdivo.  Arrives Ambulating independently, accompanied by Self     Patient was evaluated today by EMIR.  Oral medications included in this regimen:  no  Patient confirms comprehension of cancer treatment schedule:  yes  Pregnancy screening:  Not applicable  Modifications in dose or schedule:  No  Medications appearance and physical integrity checked by RN: yes.  Chemotherapy IV pump settings verified by 2 RNs:  No due to targeted therapy IV administration.  Frequency of blood return and site check throughout administration: Prior to administration, Prior to each drug, and At completion of therapy   Infusion/treatment outcome:  patient tolerated treatment without incident    Education Record    Learner:  Patient  Barriers / Limitations:  None  Method:  Reinforcement  Education / instructions given:  Plan of care.  Outcome:  Shows understanding    Discharged Home, Ambulating independently, accompanied by:Self    Patient/family verbalized understanding of future appointments: by printed AVS

## 2024-02-14 ENCOUNTER — OFFICE VISIT (OUTPATIENT)
Dept: HEMATOLOGY/ONCOLOGY | Facility: HOSPITAL | Age: 59
End: 2024-02-14
Attending: INTERNAL MEDICINE
Payer: MEDICAID

## 2024-02-14 VITALS
TEMPERATURE: 98 F | WEIGHT: 227.81 LBS | OXYGEN SATURATION: 98 % | SYSTOLIC BLOOD PRESSURE: 137 MMHG | HEART RATE: 56 BPM | DIASTOLIC BLOOD PRESSURE: 73 MMHG | HEIGHT: 68 IN | BODY MASS INDEX: 34.53 KG/M2 | RESPIRATION RATE: 16 BRPM

## 2024-02-14 DIAGNOSIS — C78.89 MELANOMA METASTATIC TO DIGESTIVE ORGAN (HCC): ICD-10-CM

## 2024-02-14 DIAGNOSIS — Z51.12 ENCOUNTER FOR ANTINEOPLASTIC IMMUNOTHERAPY: ICD-10-CM

## 2024-02-14 DIAGNOSIS — C43.61 MALIGNANT MELANOMA OF RIGHT UPPER EXTREMITY INCLUDING SHOULDER (HCC): Primary | ICD-10-CM

## 2024-02-14 LAB
ALBUMIN SERPL-MCNC: 4.3 G/DL (ref 3.2–4.8)
ALBUMIN/GLOB SERPL: 1.4 {RATIO} (ref 1–2)
ALP LIVER SERPL-CCNC: 86 U/L
ALT SERPL-CCNC: 22 U/L
ANION GAP SERPL CALC-SCNC: 4 MMOL/L (ref 0–18)
BASOPHILS # BLD AUTO: 0.08 X10(3) UL (ref 0–0.2)
BASOPHILS NFR BLD AUTO: 1.6 %
BILIRUB SERPL-MCNC: 0.6 MG/DL (ref 0.3–1.2)
CALCIUM BLD-MCNC: 9.1 MG/DL (ref 8.7–10.4)
CHLORIDE SERPL-SCNC: 107 MMOL/L (ref 98–112)
CO2 SERPL-SCNC: 27 MMOL/L (ref 21–32)
CREAT BLD-MCNC: 0.87 MG/DL
DEPRECATED RDW RBC AUTO: 47.7 FL (ref 35.1–46.3)
EGFRCR SERPLBLD CKD-EPI 2021: 100 ML/MIN/1.73M2 (ref 60–?)
EOSINOPHIL # BLD AUTO: 0.06 X10(3) UL (ref 0–0.7)
EOSINOPHIL NFR BLD AUTO: 1.2 %
ERYTHROCYTE [DISTWIDTH] IN BLOOD BY AUTOMATED COUNT: 14 % (ref 11–15)
GLOBULIN PLAS-MCNC: 3.1 G/DL (ref 2.8–4.4)
GLUCOSE BLD-MCNC: 151 MG/DL (ref 70–99)
HCT VFR BLD AUTO: 46.8 %
HGB BLD-MCNC: 15.5 G/DL
IMM GRANULOCYTES # BLD AUTO: 0 X10(3) UL (ref 0–1)
IMM GRANULOCYTES NFR BLD: 0 %
LYMPHOCYTES # BLD AUTO: 2.13 X10(3) UL (ref 1–4)
LYMPHOCYTES NFR BLD AUTO: 43.5 %
MCH RBC QN AUTO: 30.6 PG (ref 26–34)
MCHC RBC AUTO-ENTMCNC: 33.1 G/DL (ref 31–37)
MCV RBC AUTO: 92.5 FL
MONOCYTES # BLD AUTO: 0.53 X10(3) UL (ref 0.1–1)
MONOCYTES NFR BLD AUTO: 10.8 %
NEUTROPHILS # BLD AUTO: 2.1 X10 (3) UL (ref 1.5–7.7)
NEUTROPHILS # BLD AUTO: 2.1 X10(3) UL (ref 1.5–7.7)
NEUTROPHILS NFR BLD AUTO: 42.9 %
PLATELET # BLD AUTO: 208 10(3)UL (ref 150–450)
PROT SERPL-MCNC: 7.4 G/DL (ref 5.7–8.2)
RBC # BLD AUTO: 5.06 X10(6)UL
SODIUM SERPL-SCNC: 138 MMOL/L (ref 136–145)
WBC # BLD AUTO: 4.9 X10(3) UL (ref 4–11)

## 2024-02-14 PROCEDURE — 85025 COMPLETE CBC W/AUTO DIFF WBC: CPT

## 2024-02-14 PROCEDURE — 80053 COMPREHEN METABOLIC PANEL: CPT

## 2024-02-14 PROCEDURE — 96413 CHEMO IV INFUSION 1 HR: CPT

## 2024-02-14 PROCEDURE — 99215 OFFICE O/P EST HI 40 MIN: CPT | Performed by: NURSE PRACTITIONER

## 2024-02-14 NOTE — PROGRESS NOTES
Pt here for C12D1 Drug(s)Opdivo.  Arrives Ambulating independently, accompanied by Self.   PIV from lab without blood return and not flushing-dc'd.  New PIV started to R wrist per 2nd RN-good blood return noted.  Pt Cuban speaking primarily-refused , denies questions.     Patient was evaluated today by EMIR and Treatment Nurse.    Oral medications included in this regimen:  no    Patient confirms comprehension of cancer treatment schedule:  yes    Pregnancy screening:  Not applicable    Modifications in dose or schedule:  No  -ok to proceed without CMP results (original hemolyzed) per BHUPINDER Mar.  Redraw sent to lab as ordered.      Medications appearance and physical integrity checked by RN: yes.    Chemotherapy IV pump settings verified by 2 RNs:  No due to targeted therapy IV administration.  Frequency of blood return and site check throughout administration: Prior to administration, Prior to each drug, and At completion of therapy     Infusion/treatment outcome:  patient tolerated treatment without incident    PIV dc'd and wrapped in coban.    Education Record    Learner:  Patient  Barriers / Limitations:  Language  Method:  Discussion and Other: refused   Education / instructions given:  reviewed plan of care  Outcome:  Shows understanding    Discharged Home, Ambulating independently, accompanied by:Self    Patient/family verbalized understanding of future appointments: by printed AVS

## 2024-02-14 NOTE — PROGRESS NOTES
HPI   Cole Kendrick is a 58 year old male here for follow up of   Encounter Diagnoses   Name Primary?    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    Oncology history:   Extensive outside records from multiple facilities since March 2021 were reviewed.  This is a synopsis of those records, details can be found on Care Everywhere.  Patient had presented at [facility] in [facility] March 2021 due to a mass of the right shoulder.  At that time he underwent imaging with an MRI which showed a superficial right upper arm mass which appeared to be veins within the dermis.  It measured 3.9 cm x 8.3 cm x 5.9 cm it was felt that there was likely internal process of hemorrhage within the mass.  The mass demonstrated significant enhancement and it extended to the involved adjacent thickened areas of the dermis.  This mass was abutting the deltoid muscle and mildly effacing the deltoid muscle but not invading it.  There is also evidence of 13 mm short axis diameter right axillary lymph node which appeared to have a thickened cortex and was felt to be abnormal.  Dedicated ultrasound of the right axilla was recommended for further evaluation.  There were other smaller lymph nodes adjacent to the area.  Patient subsequently underwent right axillary lymph node ultrasound guided biopsy, the area contained multiple and large lymph nodes which were measuring between 3.5 and 3.2 cm.  There is at least 3 of them.  Pathology report showed lymph node tissue showing features consistent with nonspecific reactive lymphoid hyperplasia, there is no evidence of metastatic disease, nor a lymphoproliferative process or malignancy.     Patient was then referred to cancer center at [facility] in [facility], and was evaluated by her surgical oncologist Dr. Fabián Dawn.  Per review of his  consultation report, the patient had stated he had noted a new (mole) on the right arm 9 months prior to his original presentation and had enlarged over several months and began to bleed.  It was not painful and was not causing any neuromuscular issues.  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  We did discuss causes the results of the MRI above.  I was discussed for the patient to proceed with a radical resection of the right upper arm mass with placement of the wound VAC after performing the wide excision of the mass.  Surgical resection was performed on 5/26/2021 with en bloc resection of 12 cm with partial resection of the underlying right deltoid muscle.  Final wound measured 15 cm in diameter by 4 cm deep, and a wound VAC in place.  The pathology report showed that this was consistent with a malignant melanoma that measured 10.5 cm and was focally in the epidermis abutting ulcer with minute focus of possible melanoma in situ.  BRAF V600E was positive.  As the tumor staging summary, the histologic type was not determined.  The Breslow depth could not be determined.  There was ulceration present.  The mitotic rate was greater than 1/millimeter square.  Lymphovascular invasion was present.  Tumor infiltrating lymphocytes were present but nonbrisk.  There is no tumor regression noted.  The peripheral margins, and the deep margins were negative for invasive melanoma.  Tumor was 15 mm from the closest peripheral margin and 10 mm from the closest deep margin.  Plastic surgery was consulted for wound closure with skin grafting.  Patient underwent skin grafting on 6/8/2021.  Donor site was from the right thigh.     Patient was then seeking oncologic care in [facility] in [facility].  The records from that organization are not available on Care  Everywhere.  The patient states that he was receiving treatment with the local oncologist, Dr. Arce, with about 8 pills a day he was taking twice a day.  He states that he would pick them up at his doctor's office.  He states that he had had a PET scan which is showed positive lymph nodes in his right axilla.  On records available in care everywhere on 3/3/2022, there was a CT scan of the chest, abdomen and pelvis with contrast, which was compared to a PET/CT from September 16, 2021.  This states that on the prior PET/CT from September 2021 there had been enlarged right axillary lymphadenopathy that had measured 2.6 x 2.1 cm, and on the current study from March 3, 2022 it had resolved.  There was no other evidence seen of metastatic disease.  There was a 1 cm flash enhancing lesion in the right anterior lobe of the liver which was felt to be a hemangioma.     Then on July 25, 2022, it was noted that the patient had iron deficiency anemia.  At that time, on the note from the infusion center on 9/8/2022, at which time the patient was receiving Venofer infusions, the only medication listed on his med list was tramadol.  He did receive infusion of 300 mg of Venofer x6 doses as an outpatient.  Last dose was given on 10/27/2022.     On November 7, 2022, the patient received a blood transfusion for hemoglobin of 6.2, transfusion occurred on 11/8/2022.  Patient was then admitted on 12/21/2023 and discharged on 12/23/2023 to [facility], due to secondary acute anemia from blood loss secondary to GI bleed.  At that time he had presented with several weeks of dizziness, weakness, and left lower extremity swelling.  His oncologist recommended he go to the ED for evaluation.  He did a left lower extremity Doppler which was negative for DVT.  Hemoglobin at the time was 3.2.  Patient had complained of several weeks of painless red-maroon or black-colored stools which were not often.  He denied any abdominal  pain.  He denied any nausea or vomiting.  Patient received a total of 6 units of packed red cells and underwent an EGD which revealed 5 gastric ulcers with no active bleeding.  He was placed on 3 months of PPI therapy twice daily and was recommended for repeat EGD in 2 months.  At that time, the only medication listed was metformin.   Patient was then readmitted on 1/4/2023 and discharged on 1/6/2023, due to a recurrent GI bleed.  At that time, he had reported an abrupt onset of a sensation of fatigue, diaphoresis and dizziness, and then started having dark stools.  When he presented to the ED, with hypotension and hemoglobin of 8.1.  There was concern for recurrent GI bleed, however there is no evidence of overt GI bleed.  He was transfused 2 units of packed red cells, with hemoglobin up to 9.3 on day of discharge.  It was not recommended to repeat endoscopy at the time.  He was recommended to continue with the pantoprazole twice daily.  He had also had a tagged red cell scan during the first admission which was negative.       Patient presented back to his oncologist on 01/11/2023, with a hemoglobin of 6.5.  On 1/12/2023 he was then transfused 2 units of packed red cells at the outpatient infusion center at [facility].  Subsequent hemoglobin on 1/19/2023 was 7.1.  She did have repeat iron studies on 1/23/2023, with a ferritin of 12, percent iron saturation of 4%, LDH elevated to 364, with a total bilirubin of 0.4.  He again was managed with Venofer 300 mg infusions weekly x9 weeks.  With last dose administered on 5/4/2023.     Patient then had PET/CT on 2/02/2023, which at that time showed 2 thickened hypermetabolic small bowel loops with SUV max of 11.07 and 13.72.  They have felt that these findings were suspicious for an intussusception and a CT scan of the abdomen pelvis was recommended with and without contrast.  They still noted that there was no right axillary lymphadenopathy.     Patient was then  readmitted on 5/17/2023 through 5/18/2023, again due to acute on chronic blood loss anemia and received 2 units of packed red cells.  It was suspected to be an upper GI bleed and was referred for outpatient capsule endoscopy.  At the time of presentation on 5/17/2023 he had offered a 4-day history of black stools and dizziness in the morning.  When he presented to the ED his hemoglobin was 5.7.  After transfusion of 2 units of packed red cells his hemoglobin was up to 7.1.  Last hemoglobin on file as outpatient on 5/25/2023 was 8.4 with ferritin of 11.     Presented to [facility] ER on 6/28/23 with c/o weakness, epigastric pain and melanotic stools.  Hgb 4.6.  PMH c/w DM, gastric ulcers with h/o GI bleed s/p IV iron.  He underwent an EGD which did not show any evidence of upper GI bleeding. There were 2 small superficial ulcers/erosions in the duodenal bulb which were not felt to be the cause of the profound anemia with hemoglobin of less than 5 on admission. Patient was recommended to have a CT enterography due to PET/CT on 2/2/2023 with 2 thickened hypermetabolic masses and small bowel which were felt to be secondary to intussusception. Capsule endoscopy was to be considered pending results. CT enterography was performed on 6/29/2023, which showed an 8.7 cm area of masslike thickening involving a loop of small bowel within the right lower quadrant. There was an additional 7 cm area of masslike thickening involving a loop of small bowel within the left lower quadrant. These findings were felt to be suspicious of metastatic disease to small bowel given best history of melanoma.     S/p en bloc small bowel resection and partial omentectomy ([facility]) on 6/30/23 - two separate foci of metastatic melanoma, 7.5 cm and 6.5 cm, BRAF mutant.      S/p cycle 4 Ipilimumab 3 mg/Nivolumab 1 mg.  Tolerated very well.  Feels well.       Currently s/p cycle 11 single agent nivolumab, tolerating well.      Fatigue:  No    Fevers:   No    Appetite/taste changes:  No    Mucositis:  No    Weight changes:  No    Nausea/vomiting:  No    Diarrhea:  No    Constipation:  No    Peripheral neuropathy:  No     Was planning on going back home to [place] for tx however no appts are set up.   He now reports that he will likely be coming back here for tx q 2 weeks.     ECOG PS 0    Review of Systems:   Review of Systems   Constitutional:  Negative for fever.   Respiratory:  Negative for cough and shortness of breath.    Cardiovascular:  Negative for chest pain, leg swelling and palpitations.   Gastrointestinal:  Negative for abdominal pain and blood in stool.   Genitourinary:  Negative for difficulty urinating, dysuria, frequency and hematuria.    Musculoskeletal:  Negative for arthralgias (on and off at different joints) and back pain.   Skin:  Negative for rash.   Neurological:  Negative for dizziness and headaches.   Hematological:  Negative for adenopathy. Does not bruise/bleed easily.   Psychiatric/Behavioral:  Negative for sleep disturbance.          No current outpatient medications on file.     Allergies:   No Known Allergies    Past Medical History:   Diagnosis Date    Anemia     with multiple blood transfusion    GIB (gastrointestinal bleeding) 12/2022    Melanoma (HCC) 06/01/2021    right shoulder s/p surgical excision and skin graft, adjuvant BRAF/MEK inhibitors for 1.5 yrs    Personal history of antineoplastic chemotherapy      Past Surgical History:   Procedure Laterality Date    OTHER SURGICAL HISTORY      small intestine surgery    UPPER GI ENDOSCOPY,EXAM  12/01/2022     Social History     Socioeconomic History    Marital status: Single   Tobacco Use    Smoking status: Never    Smokeless tobacco: Never   Vaping Use    Vaping Use: Never used   Substance and Sexual Activity    Alcohol use: Not Currently    Drug use: Never       No family history on file.      PHYSICAL EXAM:    /73 (BP Location: Right arm, Patient Position: Sitting,  Cuff Size: large)   Pulse 56   Temp 97.7 °F (36.5 °C) (Oral)   Resp 16   Ht 1.727 m (5' 8\")   Wt 103.3 kg (227 lb 12.8 oz)   SpO2 98%   BMI 34.64 kg/m²   Wt Readings from Last 6 Encounters:   01/31/24 102.3 kg (225 lb 8 oz)   01/17/24 100.2 kg (221 lb)   01/03/24 100.7 kg (222 lb)   12/21/23 100.2 kg (221 lb)   12/06/23 100 kg (220 lb 6.4 oz)   11/09/23 98.5 kg (217 lb 3.2 oz)     Physical Exam  General: Patient is alert, not in acute distress.    HEENT: EOMs intact. PERRL, MMM.   Neck: No JVD. No palpable lymphadenopathy. Neck is supple, surgical scar.  Chest: Clear to auscultation.  Heart: Regular rate and rhythm.   Abdomen: Soft, non tender with good bowel sounds.    Extremities: No edema.  Neurological: Grossly intact.   Psych/Depression: nl        ASSESSMENT/PLAN:     1. Malignant melanoma of right upper extremity including shoulder (HCC)    2. 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 Encounter for antineoplastic immunotherapy          1. Malignant melanoma of right upper extremity including shoulder (HCC)  Proceed with treatment with nivolumab and ipilimumab x 4 cycles followed by maintenance nivolumab for total of 18 to 24 months.  Discussed goal of treatment is still potentially curative with these agents.      S/p cycle 4 Ipilimumab 3 mg/Nivolumab 1 mg.      Tumor assessment after the fourth cycle of treatment with marked response to therapy.  Tumor assessment after cycle 10 with very minimal activity in the LN in the abdomen.    Currently s/p cycle 11 single agent nivolumab. Tolerated well.       Proceed with cycle 12 -    maintenance nivolumab at 3 mg/kg every 2 weeks.  This will be to complete a total of 18 months to 24 months of treatment from initiation of therapy.  He is working on transfering care back to [place] but continue with tx with our team in the meantime until all is set up.     He will have repeat PET/CT in 3 months.  This will be in April 2024.     Patient is tolerating  very well.          No orders of the defined types were placed in this encounter.    Call prn.      MDM: high, malignancy, life threatening. Drug therapy requiring intensive monitoring for tox          Results From Past 48 Hours:  Recent Results (from the past 48 hour(s))   Comp Metabolic Panel (14)    Collection Time: 02/14/24  8:15 AM   Result Value Ref Range    Glucose 151 (H) 70 - 99 mg/dL    Sodium 138 136 - 145 mmol/L    Potassium      Chloride 107 98 - 112 mmol/L    CO2 27.0 21.0 - 32.0 mmol/L    Anion Gap 4 0 - 18 mmol/L    BUN      Creatinine 0.87 0.70 - 1.30 mg/dL    BUN/CREA Ratio      Calcium, Total 9.1 8.7 - 10.4 mg/dL    eGFR-Cr 100 >=60 mL/min/1.73m2    ALT 22 10 - 49 U/L    AST      Alkaline Phosphatase 86 45 - 117 U/L    Bilirubin, Total 0.6 0.3 - 1.2 mg/dL    Total Protein 7.4 5.7 - 8.2 g/dL    Albumin 4.3 3.2 - 4.8 g/dL    Globulin  3.1 2.8 - 4.4 g/dL    A/G Ratio 1.4 1.0 - 2.0    Patient Fasting for CMP? Patient not present    CBC W/ DIFFERENTIAL    Collection Time: 02/14/24  8:15 AM   Result Value Ref Range    WBC 4.9 4.0 - 11.0 x10(3) uL    RBC 5.06 4.30 - 5.70 x10(6)uL    HGB 15.5 13.0 - 17.5 g/dL    HCT 46.8 39.0 - 53.0 %    MCV 92.5 80.0 - 100.0 fL    MCH 30.6 26.0 - 34.0 pg    MCHC 33.1 31.0 - 37.0 g/dL    RDW-SD 47.7 (H) 35.1 - 46.3 fL    RDW 14.0 11.0 - 15.0 %    .0 150.0 - 450.0 10(3)uL    Neutrophil Absolute Prelim 2.10 1.50 - 7.70 x10 (3) uL    Neutrophil Absolute 2.10 1.50 - 7.70 x10(3) uL    Lymphocyte Absolute 2.13 1.00 - 4.00 x10(3) uL    Monocyte Absolute 0.53 0.10 - 1.00 x10(3) uL    Eosinophil Absolute 0.06 0.00 - 0.70 x10(3) uL    Basophil Absolute 0.08 0.00 - 0.20 x10(3) uL    Immature Granulocyte Absolute 0.00 0.00 - 1.00 x10(3) uL    Neutrophil % 42.9 %    Lymphocyte % 43.5 %    Monocyte % 10.8 %    Eosinophil % 1.2 %    Basophil % 1.6 %    Immature Granulocyte % 0.0 %   Scan slide    Collection Time: 02/14/24  8:15 AM   Result Value Ref Range    Slide Review        Platelets reviewed on smear. No giant platelets or platelet clumps observed.       PET (NON-STAND)SCANS(SKULL TO TOES EX:MELANOMA)(CPT=78816) [[id number]] Collected: 01/09/24 1303  Order Status: Completed Updated: 01/09/24 1304  Narrative:    PROCEDURE: PET/CT (NON-STAND) SCANS(SKULL TO TOES) (CPT=78816)     COMPARISON: [facility], PET (NON-STAND) SCANS(SKULL TO TOES) (CPT=78816), 8/01/2023, 7:28 AM.  [facility], PET (NON-STAND) SCANS(SKULL TO TOES) (CPT=78816), 10/18/2023, 7:29 AM.     INDICATIONS: Subsequent treatment strategy.  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  Whole body PET/CT imaging was performed of the entire body, skull to feet, with multi-planar imaging without oral or  intravenous contrast material, using a dedicated integrated PET/CT scanner.       PATIENT BLOOD GLUCOSE: 116 mg/dL.    UPTAKE TIME: 66 minutes for whole body imaging     FINDINGS:  Attenuation corrected and non-attenuation corrected images were reviewed from the head to the feet.  Mediastinal blood pool is 2.0 max SUV, and hepatic blood pool is 2.9 max SUV.     HEAD/NECK: Left submandibular gland has been removed or is atrophic.  No pathological FDG activity.  LUNGS: No pathological FDG activity.  No suspicious nodules on CT imaging.  MEDIASTINUM/KIERSTEN: No lymphadenopathy.  No pathological FDG activity.    CHEST WALL/AXILLA: No lymphadenopathy.  No pathological FDG activity.    ABDOMEN/ PELVIS: Prior small bowel resection in the right lower quadrant with reanastomosis.  No obstruction or inflammation the surgical site.  12 x 17 mm (currently the 2.2 max SUV, previously 16 x 22 mm and 3.7 max SUV) ovoid solid nodule in the  central aspect of the abdomen at the level the  umbilicus.  There is a 6 x 10 mm (currently 0.9 max SUV, previously 12 x 10 mm and 2.4 max SUV) lymph node in the central abdominal mesentery slightly left of midline.  Previous exam also demonstrated a  smaller 7 x 14-mm lymph node which is no longer present.  Cortical based low density foci in the lower pole of the right kidney compatible with cysts.  Small bilateral fat containing inguinal hernias.  MUSCULOSKELETAL: Scattered mild degenerative change within the spine.  No pathological FDG activity.  No suspicious lesions on CT imaging.  LOWER EXTREMITIES: Degenerative changes in both knees.  Small left knee effusion and moderate-sized right knee effusion.  No pathological FDG activity.  No suspicious lesions on CT imaging.  Postprocedural changes and scar are present over the lateral  aspect of the proximal humerus/shoulder.  OTHER: Negative.                Impression:    CONCLUSION:  There is history of metastatic melanoma.  Postprocedural changes in the right shoulder.  Mesentery lymphadenopathy demonstrated on prior PET-CT exams continue to decrease in size.  All nodes are less FDG avid or no longer FDG avid.  No new metastatic  foci.          Dictated by (CST): Drew Adams MD on 1/09/2024 at 12:43 PM      Finalized by (CST): Drew Adams MD on 1/09/2024 at 1:03 PM            Imaging & Referrals:  None   No orders of the defined types were placed in this encounter.

## 2024-02-28 ENCOUNTER — NURSE ONLY (OUTPATIENT)
Dept: HEMATOLOGY/ONCOLOGY | Facility: HOSPITAL | Age: 59
End: 2024-02-28
Attending: INTERNAL MEDICINE
Payer: MEDICAID

## 2024-02-28 ENCOUNTER — OFFICE VISIT (OUTPATIENT)
Dept: HEMATOLOGY/ONCOLOGY | Facility: HOSPITAL | Age: 59
End: 2024-02-28
Attending: NURSE PRACTITIONER
Payer: MEDICAID

## 2024-02-28 VITALS
TEMPERATURE: 98 F | SYSTOLIC BLOOD PRESSURE: 139 MMHG | HEART RATE: 52 BPM | HEIGHT: 68 IN | OXYGEN SATURATION: 97 % | WEIGHT: 229.69 LBS | BODY MASS INDEX: 34.81 KG/M2 | DIASTOLIC BLOOD PRESSURE: 80 MMHG | RESPIRATION RATE: 16 BRPM

## 2024-02-28 DIAGNOSIS — Z51.12 ENCOUNTER FOR ANTINEOPLASTIC IMMUNOTHERAPY: ICD-10-CM

## 2024-02-28 DIAGNOSIS — C43.61 MALIGNANT MELANOMA OF RIGHT UPPER EXTREMITY INCLUDING SHOULDER (HCC): Primary | ICD-10-CM

## 2024-02-28 DIAGNOSIS — C78.89 MELANOMA METASTATIC TO DIGESTIVE ORGAN (HCC): ICD-10-CM

## 2024-02-28 LAB
ALBUMIN SERPL-MCNC: 4.4 G/DL (ref 3.2–4.8)
ALBUMIN/GLOB SERPL: 1.4 {RATIO} (ref 1–2)
ALP LIVER SERPL-CCNC: 98 U/L
ALT SERPL-CCNC: 30 U/L
ANION GAP SERPL CALC-SCNC: 5 MMOL/L (ref 0–18)
AST SERPL-CCNC: 17 U/L (ref ?–34)
BASOPHILS # BLD AUTO: 0.09 X10(3) UL (ref 0–0.2)
BASOPHILS NFR BLD AUTO: 1.5 %
BILIRUB SERPL-MCNC: 0.5 MG/DL (ref 0.3–1.2)
BUN BLD-MCNC: 15 MG/DL (ref 9–23)
BUN/CREAT SERPL: 16.1 (ref 10–20)
CALCIUM BLD-MCNC: 9.5 MG/DL (ref 8.7–10.4)
CHLORIDE SERPL-SCNC: 105 MMOL/L (ref 98–112)
CO2 SERPL-SCNC: 29 MMOL/L (ref 21–32)
CREAT BLD-MCNC: 0.93 MG/DL
DEPRECATED RDW RBC AUTO: 45.7 FL (ref 35.1–46.3)
EGFRCR SERPLBLD CKD-EPI 2021: 95 ML/MIN/1.73M2 (ref 60–?)
EOSINOPHIL # BLD AUTO: 0.07 X10(3) UL (ref 0–0.7)
EOSINOPHIL NFR BLD AUTO: 1.2 %
ERYTHROCYTE [DISTWIDTH] IN BLOOD BY AUTOMATED COUNT: 13.3 % (ref 11–15)
GLOBULIN PLAS-MCNC: 3.2 G/DL (ref 2.8–4.4)
GLUCOSE BLD-MCNC: 155 MG/DL (ref 70–99)
HCT VFR BLD AUTO: 47.6 %
HGB BLD-MCNC: 16.3 G/DL
IMM GRANULOCYTES # BLD AUTO: 0.02 X10(3) UL (ref 0–1)
IMM GRANULOCYTES NFR BLD: 0.3 %
LYMPHOCYTES # BLD AUTO: 2.35 X10(3) UL (ref 1–4)
LYMPHOCYTES NFR BLD AUTO: 40 %
MCH RBC QN AUTO: 31.7 PG (ref 26–34)
MCHC RBC AUTO-ENTMCNC: 34.2 G/DL (ref 31–37)
MCV RBC AUTO: 92.4 FL
MONOCYTES # BLD AUTO: 0.67 X10(3) UL (ref 0.1–1)
MONOCYTES NFR BLD AUTO: 11.4 %
NEUTROPHILS # BLD AUTO: 2.67 X10 (3) UL (ref 1.5–7.7)
NEUTROPHILS # BLD AUTO: 2.67 X10(3) UL (ref 1.5–7.7)
NEUTROPHILS NFR BLD AUTO: 45.6 %
OSMOLALITY SERPL CALC.SUM OF ELEC: 292 MOSM/KG (ref 275–295)
PLATELET # BLD AUTO: 228 10(3)UL (ref 150–450)
POTASSIUM SERPL-SCNC: 4.6 MMOL/L (ref 3.5–5.1)
PROT SERPL-MCNC: 7.6 G/DL (ref 5.7–8.2)
RBC # BLD AUTO: 5.15 X10(6)UL
SODIUM SERPL-SCNC: 139 MMOL/L (ref 136–145)
T4 FREE SERPL-MCNC: 1.3 NG/DL (ref 0.8–1.7)
TSI SER-ACNC: 3.87 MIU/ML (ref 0.55–4.78)
WBC # BLD AUTO: 5.9 X10(3) UL (ref 4–11)

## 2024-02-28 PROCEDURE — 99215 OFFICE O/P EST HI 40 MIN: CPT | Performed by: NURSE PRACTITIONER

## 2024-02-28 PROCEDURE — 96413 CHEMO IV INFUSION 1 HR: CPT

## 2024-02-28 PROCEDURE — 85025 COMPLETE CBC W/AUTO DIFF WBC: CPT

## 2024-02-28 PROCEDURE — 84439 ASSAY OF FREE THYROXINE: CPT

## 2024-02-28 PROCEDURE — 84443 ASSAY THYROID STIM HORMONE: CPT

## 2024-02-28 PROCEDURE — 80053 COMPREHEN METABOLIC PANEL: CPT

## 2024-02-28 NOTE — PROGRESS NOTES
Pt here for C12D1 Drug(s)OPDIVO.  Arrives Ambulating independently, accompanied by Self     Patient was evaluated today by EMIR and Treatment Nurse.    L AC PIV from lab C/D/I, good blood return noted.    Oral medications included in this regimen:  no    Patient confirms comprehension of cancer treatment schedule:  yes    Pregnancy screening:  Not applicable    Modifications in dose or schedule:  No    Medications appearance and physical integrity checked by RN: yes.    Chemotherapy IV pump settings verified by 2 RNs:  No due to targeted therapy IV administration.  Frequency of blood return and site check throughout administration: Prior to administration, Prior to each drug, and At completion of therapy     Infusion/treatment outcome:  patient tolerated treatment without incident    PIV removed.    Education Record    Learner:  Patient  Barriers / Limitations:  Language  Method:  Discussion and Printed material  Education / instructions given:  Medication, schedule, plan of care  Outcome:  Shows understanding    Discharged Home, Ambulating independently, accompanied by:Self    Patient/family verbalized understanding of future appointments: by printed AVS

## 2024-02-28 NOTE — PROGRESS NOTES
HPI   Cole Kendrick is a 58 year old male here for follow up of   Encounter Diagnoses   Name Primary?    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    Oncology history:   Extensive outside records from multiple facilities since March 2021 were reviewed.  This is a synopsis of those records, details can be found on Care Everywhere.  Patient had presented at [facility] in [facility] March 2021 due to a mass of the right shoulder.  At that time he underwent imaging with an MRI which showed a superficial right upper arm mass which appeared to be veins within the dermis.  It measured 3.9 cm x 8.3 cm x 5.9 cm it was felt that there was likely internal process of hemorrhage within the mass.  The mass demonstrated significant enhancement and it extended to the involved adjacent thickened areas of the dermis.  This mass was abutting the deltoid muscle and mildly effacing the deltoid muscle but not invading it.  There is also evidence of 13 mm short axis diameter right axillary lymph node which appeared to have a thickened cortex and was felt to be abnormal.  Dedicated ultrasound of the right axilla was recommended for further evaluation.  There were other smaller lymph nodes adjacent to the area.  Patient subsequently underwent right axillary lymph node ultrasound guided biopsy, the area contained multiple and large lymph nodes which were measuring between 3.5 and 3.2 cm.  There is at least 3 of them.  Pathology report showed lymph node tissue showing features consistent with nonspecific reactive lymphoid hyperplasia, there is no evidence of metastatic disease, nor a lymphoproliferative process or malignancy.     Patient was then referred to cancer center at [facility] in [facility], and was evaluated by her surgical oncologist Dr. Fabián Dawn.  Per review of his  consultation report, the patient had stated he had noted a new (mole) on the right arm 9 months prior to his original presentation and had enlarged over several months and began to bleed.  It was not painful and was not causing any neuromuscular issues.  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  We did discuss causes the results of the MRI above.  I was discussed for the patient to proceed with a radical resection of the right upper arm mass with placement of the wound VAC after performing the wide excision of the mass.  Surgical resection was performed on 5/26/2021 with en bloc resection of 12 cm with partial resection of the underlying right deltoid muscle.  Final wound measured 15 cm in diameter by 4 cm deep, and a wound VAC in place.  The pathology report showed that this was consistent with a malignant melanoma that measured 10.5 cm and was focally in the epidermis abutting ulcer with minute focus of possible melanoma in situ.  BRAF V600E was positive.  As the tumor staging summary, the histologic type was not determined.  The Breslow depth could not be determined.  There was ulceration present.  The mitotic rate was greater than 1/millimeter square.  Lymphovascular invasion was present.  Tumor infiltrating lymphocytes were present but nonbrisk.  There is no tumor regression noted.  The peripheral margins, and the deep margins were negative for invasive melanoma.  Tumor was 15 mm from the closest peripheral margin and 10 mm from the closest deep margin.  Plastic surgery was consulted for wound closure with skin grafting.  Patient underwent skin grafting on 6/8/2021.  Donor site was from the right thigh.     Patient was then seeking oncologic care in [facility] in [facility].  The records from that organization are not available on Care  Everywhere.  The patient states that he was receiving treatment with the local oncologist, Dr. Arce, with about 8 pills a day he was taking twice a day.  He states that he would pick them up at his doctor's office.  He states that he had had a PET scan which is showed positive lymph nodes in his right axilla.  On records available in care everywhere on 3/3/2022, there was a CT scan of the chest, abdomen and pelvis with contrast, which was compared to a PET/CT from September 16, 2021.  This states that on the prior PET/CT from September 2021 there had been enlarged right axillary lymphadenopathy that had measured 2.6 x 2.1 cm, and on the current study from March 3, 2022 it had resolved.  There was no other evidence seen of metastatic disease.  There was a 1 cm flash enhancing lesion in the right anterior lobe of the liver which was felt to be a hemangioma.     Then on July 25, 2022, it was noted that the patient had iron deficiency anemia.  At that time, on the note from the infusion center on 9/8/2022, at which time the patient was receiving Venofer infusions, the only medication listed on his med list was tramadol.  He did receive infusion of 300 mg of Venofer x6 doses as an outpatient.  Last dose was given on 10/27/2022.     On November 7, 2022, the patient received a blood transfusion for hemoglobin of 6.2, transfusion occurred on 11/8/2022.  Patient was then admitted on 12/21/2023 and discharged on 12/23/2023 to [facility], due to secondary acute anemia from blood loss secondary to GI bleed.  At that time he had presented with several weeks of dizziness, weakness, and left lower extremity swelling.  His oncologist recommended he go to the ED for evaluation.  He did a left lower extremity Doppler which was negative for DVT.  Hemoglobin at the time was 3.2.  Patient had complained of several weeks of painless red-maroon or black-colored stools which were not often.  He denied any abdominal  pain.  He denied any nausea or vomiting.  Patient received a total of 6 units of packed red cells and underwent an EGD which revealed 5 gastric ulcers with no active bleeding.  He was placed on 3 months of PPI therapy twice daily and was recommended for repeat EGD in 2 months.  At that time, the only medication listed was metformin.   Patient was then readmitted on 1/4/2023 and discharged on 1/6/2023, due to a recurrent GI bleed.  At that time, he had reported an abrupt onset of a sensation of fatigue, diaphoresis and dizziness, and then started having dark stools.  When he presented to the ED, with hypotension and hemoglobin of 8.1.  There was concern for recurrent GI bleed, however there is no evidence of overt GI bleed.  He was transfused 2 units of packed red cells, with hemoglobin up to 9.3 on day of discharge.  It was not recommended to repeat endoscopy at the time.  He was recommended to continue with the pantoprazole twice daily.  He had also had a tagged red cell scan during the first admission which was negative.       Patient presented back to his oncologist on 01/11/2023, with a hemoglobin of 6.5.  On 1/12/2023 he was then transfused 2 units of packed red cells at the outpatient infusion center at [facility].  Subsequent hemoglobin on 1/19/2023 was 7.1.  She did have repeat iron studies on 1/23/2023, with a ferritin of 12, percent iron saturation of 4%, LDH elevated to 364, with a total bilirubin of 0.4.  He again was managed with Venofer 300 mg infusions weekly x9 weeks.  With last dose administered on 5/4/2023.     Patient then had PET/CT on 2/02/2023, which at that time showed 2 thickened hypermetabolic small bowel loops with SUV max of 11.07 and 13.72.  They have felt that these findings were suspicious for an intussusception and a CT scan of the abdomen pelvis was recommended with and without contrast.  They still noted that there was no right axillary lymphadenopathy.     Patient was then  readmitted on 5/17/2023 through 5/18/2023, again due to acute on chronic blood loss anemia and received 2 units of packed red cells.  It was suspected to be an upper GI bleed and was referred for outpatient capsule endoscopy.  At the time of presentation on 5/17/2023 he had offered a 4-day history of black stools and dizziness in the morning.  When he presented to the ED his hemoglobin was 5.7.  After transfusion of 2 units of packed red cells his hemoglobin was up to 7.1.  Last hemoglobin on file as outpatient on 5/25/2023 was 8.4 with ferritin of 11.     Presented to [facility] ER on 6/28/23 with c/o weakness, epigastric pain and melanotic stools.  Hgb 4.6.  PMH c/w DM, gastric ulcers with h/o GI bleed s/p IV iron.  He underwent an EGD which did not show any evidence of upper GI bleeding. There were 2 small superficial ulcers/erosions in the duodenal bulb which were not felt to be the cause of the profound anemia with hemoglobin of less than 5 on admission. Patient was recommended to have a CT enterography due to PET/CT on 2/2/2023 with 2 thickened hypermetabolic masses and small bowel which were felt to be secondary to intussusception. Capsule endoscopy was to be considered pending results. CT enterography was performed on 6/29/2023, which showed an 8.7 cm area of masslike thickening involving a loop of small bowel within the right lower quadrant. There was an additional 7 cm area of masslike thickening involving a loop of small bowel within the left lower quadrant. These findings were felt to be suspicious of metastatic disease to small bowel given best history of melanoma.     S/p en bloc small [facility] 6[facility]23 - two separate foci of metastatic melanoma, 7.5 cm and 6.5 cm, BRAF mutant.      S/p cycle 4 Ipilimumab 3 mg/Nivolumab 1 mg.  Tolerated very well.  Feels well.       Currently s/p cycle 12 single agent nivolumab, tolerating well.      Fatigue:  No    Fevers:   No    Appetite/taste changes:  No    Mucositis:  No    Weight changes:  No    Nausea/vomiting:  No    Diarrhea:  No    Constipation:  No    Peripheral neuropathy:  No     Was planning on going back home to [place] for tx however no appts are set up. Has appt in [place] tomorrow with PCP. Will be planning on getting referral to Oncologist.   He now reports that he will likely be coming back here for tx q 2 weeks.     ECOG PS 0    Review of Systems:   Review of Systems   Constitutional:  Negative for fever.   Respiratory:  Negative for cough and shortness of breath.    Cardiovascular:  Negative for chest pain, leg swelling and palpitations.   Gastrointestinal:  Negative for abdominal pain and blood in stool.   Genitourinary:  Negative for difficulty urinating, dysuria, frequency and hematuria.    Musculoskeletal:  Negative for arthralgias (on and off at different joints) and back pain.   Skin:  Negative for rash.   Neurological:  Negative for dizziness and headaches.   Hematological:  Negative for adenopathy. Does not bruise/bleed easily.   Psychiatric/Behavioral:  Negative for sleep disturbance.          No current outpatient medications on file.     Allergies:   No Known Allergies    Past Medical History:   Diagnosis Date    Anemia     with multiple blood transfusion    GIB (gastrointestinal bleeding) 12/2022    Melanoma (HCC) 06/01/2021    right shoulder s/p surgical excision and skin graft, adjuvant BRAF/MEK inhibitors for 1.5 yrs    Personal history of antineoplastic chemotherapy      Past Surgical History:   Procedure Laterality Date    OTHER SURGICAL HISTORY      small intestine surgery    UPPER GI ENDOSCOPY,EXAM  12/01/2022     Social History     Socioeconomic History    Marital status: Single   Tobacco Use    Smoking status: Never    Smokeless tobacco: Never   Vaping Use    Vaping Use: Never used   Substance and Sexual Activity    Alcohol use: Not Currently    Drug use: Never       No family history  on file.      PHYSICAL EXAM:    /80 (BP Location: Right arm, Patient Position: Sitting, Cuff Size: large)   Pulse 52   Temp 97.6 °F (36.4 °C) (Oral)   Resp 16   Ht 1.727 m (5' 8\")   Wt 104.2 kg (229 lb 11.2 oz)   SpO2 97%   BMI 34.93 kg/m²   Wt Readings from Last 6 Encounters:   02/14/24 103.3 kg (227 lb 12.8 oz)   01/31/24 102.3 kg (225 lb 8 oz)   01/17/24 100.2 kg (221 lb)   01/03/24 100.7 kg (222 lb)   12/21/23 100.2 kg (221 lb)   12/06/23 100 kg (220 lb 6.4 oz)     Physical Exam  General: Patient is alert, not in acute distress.    HEENT: EOMs intact. PERRL, MMM.   Neck: No JVD. No palpable lymphadenopathy. Neck is supple, surgical scar.  Chest: Clear to auscultation.  Heart: Regular rate and rhythm.   Abdomen: Soft, non tender with good bowel sounds.    Extremities: No edema.  Neurological: Grossly intact.   Psych/Depression: nl        ASSESSMENT/PLAN:     1. Malignant melanoma of right upper extremity including shoulder (HCC)    2. 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 Encounter for antineoplastic immunotherapy          1. Malignant melanoma of right upper extremity including shoulder (HCC)  Proceed with treatment with nivolumab and ipilimumab x 4 cycles followed by maintenance nivolumab for total of 18 to 24 months.  Discussed goal of treatment is still potentially curative with these agents.      S/p cycle 4 Ipilimumab 3 mg/Nivolumab 1 mg.      Tumor assessment after the fourth cycle of treatment with marked response to therapy.  Tumor assessment after cycle 10 with very minimal activity in the LN in the abdomen.    Currently s/p cycle 12 single agent nivolumab. Tolerated well.       Proceed with cycle 13 -    maintenance nivolumab at 3 mg/kg every 2 weeks.  This will be to complete a total of 18 months to 24 months of treatment from initiation of therapy.  He is working on transfering care back to [place] but continue with tx with our team in the meantime until all is set up.     He  will have repeat PET/CT in 3 months.  This will be in April 2024.     Patient is tolerating very well.        New MD [place]        No orders of the defined types were placed in this encounter.    Call prn.      MDM: high, malignancy, life threatening. Drug therapy requiring intensive monitoring for tox          Results From Past 48 Hours:  Recent Results (from the past 48 hour(s))   Comp Metabolic Panel (14)    Collection Time: 02/28/24  7:58 AM   Result Value Ref Range    Glucose 155 (H) 70 - 99 mg/dL    Sodium 139 136 - 145 mmol/L    Potassium 4.6 3.5 - 5.1 mmol/L    Chloride 105 98 - 112 mmol/L    CO2 29.0 21.0 - 32.0 mmol/L    Anion Gap 5 0 - 18 mmol/L    BUN 15 9 - 23 mg/dL    Creatinine 0.93 0.70 - 1.30 mg/dL    BUN/CREA Ratio 16.1 10.0 - 20.0    Calcium, Total 9.5 8.7 - 10.4 mg/dL    Calculated Osmolality 292 275 - 295 mOsm/kg    eGFR-Cr 95 >=60 mL/min/1.73m2    ALT 30 10 - 49 U/L    AST 17 <=34 U/L    Alkaline Phosphatase 98 45 - 117 U/L    Bilirubin, Total 0.5 0.3 - 1.2 mg/dL    Total Protein 7.6 5.7 - 8.2 g/dL    Albumin 4.4 3.2 - 4.8 g/dL    Globulin  3.2 2.8 - 4.4 g/dL    A/G Ratio 1.4 1.0 - 2.0    Patient Fasting for CMP? Patient not present    TSH [E]    Collection Time: 02/28/24  7:58 AM   Result Value Ref Range    TSH 3.868 0.550 - 4.780 mIU/mL   T4 FREE [E]    Collection Time: 02/28/24  7:58 AM   Result Value Ref Range    Free T4 1.3 0.8 - 1.7 ng/dL   CBC W/ DIFFERENTIAL    Collection Time: 02/28/24  7:58 AM   Result Value Ref Range    WBC 5.9 4.0 - 11.0 x10(3) uL    RBC 5.15 4.30 - 5.70 x10(6)uL    HGB 16.3 13.0 - 17.5 g/dL    HCT 47.6 39.0 - 53.0 %    MCV 92.4 80.0 - 100.0 fL    MCH 31.7 26.0 - 34.0 pg    MCHC 34.2 31.0 - 37.0 g/dL    RDW-SD 45.7 35.1 - 46.3 fL    RDW 13.3 11.0 - 15.0 %    .0 150.0 - 450.0 10(3)uL    Neutrophil Absolute Prelim 2.67 1.50 - 7.70 x10 (3) uL    Neutrophil Absolute 2.67 1.50 - 7.70 x10(3) uL    Lymphocyte Absolute 2.35 1.00 - 4.00 x10(3) uL    Monocyte  Absolute 0.67 0.10 - 1.00 x10(3) uL    Eosinophil Absolute 0.07 0.00 - 0.70 x10(3) uL    Basophil Absolute 0.09 0.00 - 0.20 x10(3) uL    Immature Granulocyte Absolute 0.02 0.00 - 1.00 x10(3) uL    Neutrophil % 45.6 %    Lymphocyte % 40.0 %    Monocyte % 11.4 %    Eosinophil % 1.2 %    Basophil % 1.5 %    Immature Granulocyte % 0.3 %       PET (NON-STAND)SCANS(SKULL TO TOES EX:MELANOMA)(CPT=78816) [[id number]] Collected: 01/09/24 1303  Order Status: Completed Updated: 01/09/24 1304  Narrative:    PROCEDURE: PET/CT (NON-STAND) SCANS(SKULL TO TOES) (CPT=78816)     COMPARISON: [facility], PET (NON-STAND) SCANS(SKULL TO TOES) (CPT=78816), 8/01/2023, 7:28 AM.  [facility], PET (NON-STAND) SCANS(SKULL TO TOES) (CPT=78816), 10/18/2023, 7:29 AM.     INDICATIONS: Subsequent treatment strategy.  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  Whole body PET/CT imaging was performed of the entire body, skull to feet, with multi-planar imaging without oral or  intravenous contrast material, using a dedicated integrated PET/CT scanner.       PATIENT BLOOD GLUCOSE: 116 mg/dL.    UPTAKE TIME: 66 minutes for whole body imaging     FINDINGS:  Attenuation corrected and non-attenuation corrected images were reviewed from the head to the feet.  Mediastinal blood pool is 2.0 max SUV, and hepatic blood pool is 2.9 max SUV.     HEAD/NECK: Left submandibular gland has been removed or is atrophic.  No pathological FDG activity.  LUNGS: No pathological FDG activity.  No suspicious nodules on CT imaging.  MEDIASTINUM/KIERSTEN: No lymphadenopathy.  No pathological FDG activity.    CHEST WALL/AXILLA: No lymphadenopathy.  No pathological FDG activity.    ABDOMEN/ PELVIS: Prior small bowel  resection in the right lower quadrant with reanastomosis.  No obstruction or inflammation the surgical site.  12 x 17 mm (currently the 2.2 max SUV, previously 16 x 22 mm and 3.7 max SUV) ovoid solid nodule in the  central aspect of the abdomen at the level the umbilicus.  There is a 6 x 10 mm (currently 0.9 max SUV, previously 12 x 10 mm and 2.4 max SUV) lymph node in the central abdominal mesentery slightly left of midline.  Previous exam also demonstrated a  smaller 7 x 14-mm lymph node which is no longer present.  Cortical based low density foci in the lower pole of the right kidney compatible with cysts.  Small bilateral fat containing inguinal hernias.  MUSCULOSKELETAL: Scattered mild degenerative change within the spine.  No pathological FDG activity.  No suspicious lesions on CT imaging.  LOWER EXTREMITIES: Degenerative changes in both knees.  Small left knee effusion and moderate-sized right knee effusion.  No pathological FDG activity.  No suspicious lesions on CT imaging.  Postprocedural changes and scar are present over the lateral  aspect of the proximal humerus/shoulder.  OTHER: Negative.                Impression:    CONCLUSION:  There is history of metastatic melanoma.  Postprocedural changes in the right shoulder.  Mesentery lymphadenopathy demonstrated on prior PET-CT exams continue to decrease in size.  All nodes are less FDG avid or no longer FDG avid.  No new metastatic  foci.          Dictated by (CST): Drew Adams MD on 1/09/2024 at 12:43 PM      Finalized by (CST): Drew Adams MD on 1/09/2024 at 1:03 PM            Imaging & Referrals:  None   No orders of the defined types were placed in this encounter.

## 2024-03-13 ENCOUNTER — OFFICE VISIT (OUTPATIENT)
Dept: HEMATOLOGY/ONCOLOGY | Facility: HOSPITAL | Age: 59
End: 2024-03-13
Attending: PHYSICIAN ASSISTANT
Payer: MEDICAID

## 2024-03-13 ENCOUNTER — OFFICE VISIT (OUTPATIENT)
Dept: HEMATOLOGY/ONCOLOGY | Facility: HOSPITAL | Age: 59
End: 2024-03-13
Attending: INTERNAL MEDICINE
Payer: MEDICAID

## 2024-03-13 VITALS
HEIGHT: 67.99 IN | HEART RATE: 59 BPM | OXYGEN SATURATION: 97 % | DIASTOLIC BLOOD PRESSURE: 80 MMHG | BODY MASS INDEX: 35.34 KG/M2 | RESPIRATION RATE: 16 BRPM | SYSTOLIC BLOOD PRESSURE: 136 MMHG | WEIGHT: 233.19 LBS | TEMPERATURE: 98 F

## 2024-03-13 DIAGNOSIS — C78.89 MELANOMA METASTATIC TO DIGESTIVE ORGAN (HCC): ICD-10-CM

## 2024-03-13 DIAGNOSIS — Z51.12 ENCOUNTER FOR ANTINEOPLASTIC IMMUNOTHERAPY: Primary | ICD-10-CM

## 2024-03-13 DIAGNOSIS — C43.61 MALIGNANT MELANOMA OF RIGHT UPPER EXTREMITY INCLUDING SHOULDER (HCC): Primary | ICD-10-CM

## 2024-03-13 DIAGNOSIS — C43.9 MALIGNANT MELANOMA, UNSPECIFIED SITE (HCC): ICD-10-CM

## 2024-03-13 DIAGNOSIS — C43.61: ICD-10-CM

## 2024-03-13 LAB
ALBUMIN SERPL-MCNC: 4.5 G/DL (ref 3.2–4.8)
ALBUMIN/GLOB SERPL: 1.6 {RATIO} (ref 1–2)
ALP LIVER SERPL-CCNC: 88 U/L
ALT SERPL-CCNC: 26 U/L
ANION GAP SERPL CALC-SCNC: 2 MMOL/L (ref 0–18)
AST SERPL-CCNC: 18 U/L (ref ?–34)
BASOPHILS # BLD AUTO: 0.09 X10(3) UL (ref 0–0.2)
BASOPHILS NFR BLD AUTO: 1.5 %
BILIRUB SERPL-MCNC: 0.4 MG/DL (ref 0.3–1.2)
BUN BLD-MCNC: 18 MG/DL (ref 9–23)
BUN/CREAT SERPL: 19.1 (ref 10–20)
CALCIUM BLD-MCNC: 9.7 MG/DL (ref 8.7–10.4)
CHLORIDE SERPL-SCNC: 107 MMOL/L (ref 98–112)
CO2 SERPL-SCNC: 27 MMOL/L (ref 21–32)
CREAT BLD-MCNC: 0.94 MG/DL
DEPRECATED RDW RBC AUTO: 44.2 FL (ref 35.1–46.3)
EGFRCR SERPLBLD CKD-EPI 2021: 94 ML/MIN/1.73M2 (ref 60–?)
EOSINOPHIL # BLD AUTO: 0.06 X10(3) UL (ref 0–0.7)
EOSINOPHIL NFR BLD AUTO: 1 %
ERYTHROCYTE [DISTWIDTH] IN BLOOD BY AUTOMATED COUNT: 12.8 % (ref 11–15)
GLOBULIN PLAS-MCNC: 2.9 G/DL (ref 2.8–4.4)
GLUCOSE BLD-MCNC: 217 MG/DL (ref 70–99)
HCT VFR BLD AUTO: 48 %
HGB BLD-MCNC: 15.9 G/DL
IMM GRANULOCYTES # BLD AUTO: 0.02 X10(3) UL (ref 0–1)
IMM GRANULOCYTES NFR BLD: 0.3 %
LYMPHOCYTES # BLD AUTO: 2.33 X10(3) UL (ref 1–4)
LYMPHOCYTES NFR BLD AUTO: 39 %
MCH RBC QN AUTO: 30.9 PG (ref 26–34)
MCHC RBC AUTO-ENTMCNC: 33.1 G/DL (ref 31–37)
MCV RBC AUTO: 93.2 FL
MONOCYTES # BLD AUTO: 0.58 X10(3) UL (ref 0.1–1)
MONOCYTES NFR BLD AUTO: 9.7 %
NEUTROPHILS # BLD AUTO: 2.9 X10 (3) UL (ref 1.5–7.7)
NEUTROPHILS # BLD AUTO: 2.9 X10(3) UL (ref 1.5–7.7)
NEUTROPHILS NFR BLD AUTO: 48.5 %
OSMOLALITY SERPL CALC.SUM OF ELEC: 290 MOSM/KG (ref 275–295)
PLATELET # BLD AUTO: 253 10(3)UL (ref 150–450)
POTASSIUM SERPL-SCNC: 4.4 MMOL/L (ref 3.5–5.1)
PROT SERPL-MCNC: 7.4 G/DL (ref 5.7–8.2)
RBC # BLD AUTO: 5.15 X10(6)UL
SODIUM SERPL-SCNC: 136 MMOL/L (ref 136–145)
WBC # BLD AUTO: 6 X10(3) UL (ref 4–11)

## 2024-03-13 PROCEDURE — 99215 OFFICE O/P EST HI 40 MIN: CPT | Performed by: PHYSICIAN ASSISTANT

## 2024-03-13 PROCEDURE — 96413 CHEMO IV INFUSION 1 HR: CPT

## 2024-03-13 PROCEDURE — 80053 COMPREHEN METABOLIC PANEL: CPT

## 2024-03-13 PROCEDURE — 85025 COMPLETE CBC W/AUTO DIFF WBC: CPT

## 2024-03-13 NOTE — PROGRESS NOTES
HPI   Cole Kendrick is a 58 year old male here for follow up of   Encounter Diagnoses   Name Primary?    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    Oncology history:   Extensive outside records from multiple facilities since March 2021 were reviewed.  This is a synopsis of those records, details can be found on Care Everywhere.  Patient had presented at [facility] in [facility] March 2021 due to a mass of the right shoulder.  At that time he underwent imaging with an MRI which showed a superficial right upper arm mass which appeared to be veins within the dermis.  It measured 3.9 cm x 8.3 cm x 5.9 cm it was felt that there was likely internal process of hemorrhage within the mass.  The mass demonstrated significant enhancement and it extended to the involved adjacent thickened areas of the dermis.  This mass was abutting the deltoid muscle and mildly effacing the deltoid muscle but not invading it.  There is also evidence of 13 mm short axis diameter right axillary lymph node which appeared to have a thickened cortex and was felt to be abnormal.  Dedicated ultrasound of the right axilla was recommended for further evaluation.  There were other smaller lymph nodes adjacent to the area.  Patient subsequently underwent right axillary lymph node ultrasound guided biopsy, the area contained multiple and large lymph nodes which were measuring between 3.5 and 3.2 cm.  There is at least 3 of them.  Pathology report showed lymph node tissue showing features consistent with nonspecific reactive lymphoid hyperplasia, there is no evidence of metastatic disease, nor a lymphoproliferative process or malignancy.     Patient was then referred to cancer center at [facility] in [facility], and was evaluated by her surgical oncologist Dr. Fabián Dawn.  Per review of his consultation report, the  patient had stated he had noted a new (mole) on the right arm 9 months prior to his original presentation and had enlarged over several months and began to bleed.  It was not painful and was not causing any neuromuscular issues.  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  We did discuss causes the results of the MRI above.  I was discussed for the patient to proceed with a radical resection of the right upper arm mass with placement of the wound VAC after performing the wide excision of the mass.  Surgical resection was performed on 5/26/2021 with en bloc resection of 12 cm with partial resection of the underlying right deltoid muscle.  Final wound measured 15 cm in diameter by 4 cm deep, and a wound VAC in place.  The pathology report showed that this was consistent with a malignant melanoma that measured 10.5 cm and was focally in the epidermis abutting ulcer with minute focus of possible melanoma in situ.  BRAF V600E was positive.  As the tumor staging summary, the histologic type was not determined.  The Breslow depth could not be determined.  There was ulceration present.  The mitotic rate was greater than 1/millimeter square.  Lymphovascular invasion was present.  Tumor infiltrating lymphocytes were present but nonbrisk.  There is no tumor regression noted.  The peripheral margins, and the deep margins were negative for invasive melanoma.  Tumor was 15 mm from the closest peripheral margin and 10 mm from the closest deep margin.  Plastic surgery was consulted for wound closure with skin grafting.  Patient underwent skin grafting on 6/8/2021.  Donor site was from the right thigh.     Patient was then seeking oncologic care in [facility] in [facility].  The records from that organization are not available on Care Everywhere.  The patient  states that he was receiving treatment with the local oncologist, Dr. Arce, with about 8 pills a day he was taking twice a day.  He states that he would pick them up at his doctor's office.  He states that he had had a PET scan which is showed positive lymph nodes in his right axilla.  On records available in care everywhere on 3/3/2022, there was a CT scan of the chest, abdomen and pelvis with contrast, which was compared to a PET/CT from September 16, 2021.  This states that on the prior PET/CT from September 2021 there had been enlarged right axillary lymphadenopathy that had measured 2.6 x 2.1 cm, and on the current study from March 3, 2022 it had resolved.  There was no other evidence seen of metastatic disease.  There was a 1 cm flash enhancing lesion in the right anterior lobe of the liver which was felt to be a hemangioma.     Then on July 25, 2022, it was noted that the patient had iron deficiency anemia.  At that time, on the note from the infusion center on 9/8/2022, at which time the patient was receiving Venofer infusions, the only medication listed on his med list was tramadol.  He did receive infusion of 300 mg of Venofer x6 doses as an outpatient.  Last dose was given on 10/27/2022.     On November 7, 2022, the patient received a blood transfusion for hemoglobin of 6.2, transfusion occurred on 11/8/2022.  Patient was then admitted on 12/21/2023 and discharged on 12/23/2023 to [facility], due to secondary acute anemia from blood loss secondary to GI bleed.  At that time he had presented with several weeks of dizziness, weakness, and left lower extremity swelling.  His oncologist recommended he go to the ED for evaluation.  He did a left lower extremity Doppler which was negative for DVT.  Hemoglobin at the time was 3.2.  Patient had complained of several weeks of painless red-maroon or black-colored stools which were not often.  He denied any abdominal pain.  He denied any  nausea or vomiting.  Patient received a total of 6 units of packed red cells and underwent an EGD which revealed 5 gastric ulcers with no active bleeding.  He was placed on 3 months of PPI therapy twice daily and was recommended for repeat EGD in 2 months.  At that time, the only medication listed was metformin.   Patient was then readmitted on 1/4/2023 and discharged on 1/6/2023, due to a recurrent GI bleed.  At that time, he had reported an abrupt onset of a sensation of fatigue, diaphoresis and dizziness, and then started having dark stools.  When he presented to the ED, with hypotension and hemoglobin of 8.1.  There was concern for recurrent GI bleed, however there is no evidence of overt GI bleed.  He was transfused 2 units of packed red cells, with hemoglobin up to 9.3 on day of discharge.  It was not recommended to repeat endoscopy at the time.  He was recommended to continue with the pantoprazole twice daily.  He had also had a tagged red cell scan during the first admission which was negative.       Patient presented back to his oncologist on 01/11/2023, with a hemoglobin of 6.5.  On 1/12/2023 he was then transfused 2 units of packed red cells at the outpatient infusion center at [facility].  Subsequent hemoglobin on 1/19/2023 was 7.1.  She did have repeat iron studies on 1/23/2023, with a ferritin of 12, percent iron saturation of 4%, LDH elevated to 364, with a total bilirubin of 0.4.  He again was managed with Venofer 300 mg infusions weekly x9 weeks.  With last dose administered on 5/4/2023.     Patient then had PET/CT on 2/02/2023, which at that time showed 2 thickened hypermetabolic small bowel loops with SUV max of 11.07 and 13.72.  They have felt that these findings were suspicious for an intussusception and a CT scan of the abdomen pelvis was recommended with and without contrast.  They still noted that there was no right axillary lymphadenopathy.     Patient was then readmitted on  5/17/2023 through 5/18/2023, again due to acute on chronic blood loss anemia and received 2 units of packed red cells.  It was suspected to be an upper GI bleed and was referred for outpatient capsule endoscopy.  At the time of presentation on 5/17/2023 he had offered a 4-day history of black stools and dizziness in the morning.  When he presented to the ED his hemoglobin was 5.7.  After transfusion of 2 units of packed red cells his hemoglobin was up to 7.1.  Last hemoglobin on file as outpatient on 5/25/2023 was 8.4 with ferritin of 11.     Presented to [facility] ER on 6/28/23 with c/o weakness, epigastric pain and melanotic stools.  Hgb 4.6.  PMH c/w DM, gastric ulcers with h/o GI bleed s/p IV iron.  He underwent an EGD which did not show any evidence of upper GI bleeding. There were 2 small superficial ulcers/erosions in the duodenal bulb which were not felt to be the cause of the profound anemia with hemoglobin of less than 5 on admission. Patient was recommended to have a CT enterography due to PET/CT on 2/2/2023 with 2 thickened hypermetabolic masses and small bowel which were felt to be secondary to intussusception. Capsule endoscopy was to be considered pending results. CT enterography was performed on 6/29/2023, which showed an 8.7 cm area of masslike thickening involving a loop of small bowel within the right lower quadrant. There was an additional 7 cm area of masslike thickening involving a loop of small bowel within the left lower quadrant. These findings were felt to be suspicious of metastatic disease to small bowel given best history of melanoma.     S/p en bloc small bowel resection and partial omentectomy ([facility]) on 6/30/23 - two separate foci of metastatic melanoma, 7.5 cm and 6.5 cm, BRAF mutant.      S/p cycle 4 Ipilimumab with 3 mg/Nivolumab 1 mg.  Tolerated very well.    Currently s/p cycle 13 single agent nivolumab    Fatigue:  No    Fevers:  No    Appetite/taste changes:  No    Mucositis:   No    Weight changes:  No    Nausea/vomiting:  No    Diarrhea:  No    Constipation:  No    Peripheral neuropathy:  No     Declined language line.  Daughter serves as .      Lives in [place], [place].    ECOG PS 0    Review of Systems:   Review of Systems   Constitutional:  Negative for appetite change, fatigue and fever.   HENT:   Negative for mouth sores.    Eyes:  Negative for eye problems.   Respiratory:  Negative for cough and shortness of breath.    Cardiovascular:  Negative for chest pain, leg swelling and palpitations.   Gastrointestinal:  Negative for abdominal pain, blood in stool, diarrhea and nausea.   Genitourinary:  Negative for difficulty urinating, dysuria, frequency and hematuria.    Musculoskeletal:  Negative for arthralgias (on and off at different joints) and back pain.   Skin:  Negative for itching and rash.   Neurological:  Negative for dizziness, extremity weakness and headaches.   Hematological:  Negative for adenopathy. Does not bruise/bleed easily.   Psychiatric/Behavioral:  Negative for sleep disturbance.          No current outpatient medications on file.     Allergies:   No Known Allergies    Past Medical History:   Diagnosis Date    Anemia     with multiple blood transfusion    GIB (gastrointestinal bleeding) 12/2022    Melanoma (HCC) 06/01/2021    right shoulder s/p surgical excision and skin graft, adjuvant BRAF/MEK inhibitors for 1.5 yrs    Personal history of antineoplastic chemotherapy      Past Surgical History:   Procedure Laterality Date    OTHER SURGICAL HISTORY      small intestine surgery    UPPER GI ENDOSCOPY,EXAM  12/01/2022     Social History     Socioeconomic History    Marital status: Single   Tobacco Use    Smoking status: Never    Smokeless tobacco: Never   Vaping Use    Vaping Use: Never used   Substance and Sexual Activity    Alcohol use: Not Currently    Drug use: Never       No family history on file.      PHYSICAL EXAM:    There were no vitals taken  for this visit.  Wt Readings from Last 6 Encounters:   02/28/24 104.2 kg (229 lb 11.2 oz)   02/14/24 103.3 kg (227 lb 12.8 oz)   01/31/24 102.3 kg (225 lb 8 oz)   01/17/24 100.2 kg (221 lb)   01/03/24 100.7 kg (222 lb)   12/21/23 100.2 kg (221 lb)     Physical Exam  General: Patient is alert, not in acute distress.    HEENT: EOMs intact. PERRL, Anicteric.  MMM.   Neck: No JVD. No palpable lymphadenopathy. Neck is supple, surgical scar.  Chest: Clear to auscultation.  Heart: Regular rate and rhythm.   Abdomen: Soft, non tender with good bowel sounds.    Extremities: No edema.  Neurological: Grossly intact.   Psych/Depression: nl        ASSESSMENT/PLAN:     1. Encounter for antineoplastic immunotherapy    2. 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 Malignant melanoma, unspecified site (HCC)          Malignant melanoma of right upper extremity including shoulder (HCC)  Proceed with treatment with nivolumab and ipilimumab x 4 cycles followed by maintenance nivolumab for total of 18 to 24 months.  Discussed goal of treatment is still potentially curative with these agents.      S/p cycle 4 Ipilimumab 3 mg/Nivolumab 1 mg.      Tumor assessment after the fourth cycle of treatment with marked response to therapy.  Tumor assessment after cycle 10 with very minimal activity in the LN in the abdomen.    s/p cycle 13 maintenance nivolumab 3 mg/kg every 2 weeks.       Proceed with cycle 14.  Restaging PET/CT on 4/11/24    Goal is to complete a total of 18 months to 24 months of treatment from initiation of therapy.      New MD [place]    No orders of the defined types were placed in this encounter.    Call prn.  Follow-up in 2 weeks    MDM: high, malignancy, life threatening. Drug therapy requiring intensive monitoring for tox    Results From Past 48 Hours:  Recent Results (from the past 48 hour(s))   Comp Metabolic Panel (14)    Collection Time: 03/13/24 12:55 PM   Result Value Ref Range    Glucose 217 (H) 70 - 99  mg/dL    Sodium 136 136 - 145 mmol/L    Potassium 4.4 3.5 - 5.1 mmol/L    Chloride 107 98 - 112 mmol/L    CO2 27.0 21.0 - 32.0 mmol/L    Anion Gap 2 0 - 18 mmol/L    BUN 18 9 - 23 mg/dL    Creatinine 0.94 0.70 - 1.30 mg/dL    BUN/CREA Ratio 19.1 10.0 - 20.0    Calcium, Total 9.7 8.7 - 10.4 mg/dL    Calculated Osmolality 290 275 - 295 mOsm/kg    eGFR-Cr 94 >=60 mL/min/1.73m2    ALT 26 10 - 49 U/L    AST 18 <=34 U/L    Alkaline Phosphatase 88 45 - 117 U/L    Bilirubin, Total 0.4 0.3 - 1.2 mg/dL    Total Protein 7.4 5.7 - 8.2 g/dL    Albumin 4.5 3.2 - 4.8 g/dL    Globulin  2.9 2.8 - 4.4 g/dL    A/G Ratio 1.6 1.0 - 2.0    Patient Fasting for CMP? Patient not present    CBC W/ DIFFERENTIAL    Collection Time: 03/13/24 12:55 PM   Result Value Ref Range    WBC 6.0 4.0 - 11.0 x10(3) uL    RBC 5.15 4.30 - 5.70 x10(6)uL    HGB 15.9 13.0 - 17.5 g/dL    HCT 48.0 39.0 - 53.0 %    MCV 93.2 80.0 - 100.0 fL    MCH 30.9 26.0 - 34.0 pg    MCHC 33.1 31.0 - 37.0 g/dL    RDW-SD 44.2 35.1 - 46.3 fL    RDW 12.8 11.0 - 15.0 %    .0 150.0 - 450.0 10(3)uL    Neutrophil Absolute Prelim 2.90 1.50 - 7.70 x10 (3) uL    Neutrophil Absolute 2.90 1.50 - 7.70 x10(3) uL    Lymphocyte Absolute 2.33 1.00 - 4.00 x10(3) uL    Monocyte Absolute 0.58 0.10 - 1.00 x10(3) uL    Eosinophil Absolute 0.06 0.00 - 0.70 x10(3) uL    Basophil Absolute 0.09 0.00 - 0.20 x10(3) uL    Immature Granulocyte Absolute 0.02 0.00 - 1.00 x10(3) uL    Neutrophil % 48.5 %    Lymphocyte % 39.0 %    Monocyte % 9.7 %    Eosinophil % 1.0 %    Basophil % 1.5 %    Immature Granulocyte % 0.3 %       PET (NON-STAND)SCANS(SKULL TO TOES EX:MELANOMA)(CPT=78816) [[id number]] Collected: 01/09/24 1303  Order Status: Completed Updated: 01/09/24 1304  Narrative:    PROCEDURE: PET/CT (NON-STAND) SCANS(SKULL TO TOES) (CPT=78816)     COMPARISON: [facility], PET (NON-STAND) SCANS(SKULL TO TOES) (CPT=78816), 8/01/2023, 7:28 AM.  [facility], PET  (NON-STAND) SCANS(SKULL TO TOES) (CPT=78816), 10/18/2023, 7:29 AM.     INDICATIONS: Subsequent treatment strategy.  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  Whole body PET/CT imaging was performed of the entire body, skull to feet, with multi-planar imaging without oral or  intravenous contrast material, using a dedicated integrated PET/CT scanner.       PATIENT BLOOD GLUCOSE: 116 mg/dL.    UPTAKE TIME: 66 minutes for whole body imaging     FINDINGS:  Attenuation corrected and non-attenuation corrected images were reviewed from the head to the feet.  Mediastinal blood pool is 2.0 max SUV, and hepatic blood pool is 2.9 max SUV.     HEAD/NECK: Left submandibular gland has been removed or is atrophic.  No pathological FDG activity.  LUNGS: No pathological FDG activity.  No suspicious nodules on CT imaging.  MEDIASTINUM/KIERSTEN: No lymphadenopathy.  No pathological FDG activity.    CHEST WALL/AXILLA: No lymphadenopathy.  No pathological FDG activity.    ABDOMEN/ PELVIS: Prior small bowel resection in the right lower quadrant with reanastomosis.  No obstruction or inflammation the surgical site.  12 x 17 mm (currently the 2.2 max SUV, previously 16 x 22 mm and 3.7 max SUV) ovoid solid nodule in the  central aspect of the abdomen at the level the umbilicus.  There is a 6 x 10 mm (currently 0.9 max SUV, previously 12 x 10 mm and 2.4 max SUV) lymph node in the central abdominal mesentery slightly left of midline.  Previous exam also demonstrated a  smaller 7 x 14-mm lymph node which is no longer present.  Cortical based low density foci in the lower pole of the right kidney compatible with cysts.  Small bilateral fat containing inguinal hernias.  MUSCULOSKELETAL: Scattered mild degenerative change within the  spine.  No pathological FDG activity.  No suspicious lesions on CT imaging.  LOWER EXTREMITIES: Degenerative changes in both knees.  Small left knee effusion and moderate-sized right knee effusion.  No pathological FDG activity.  No suspicious lesions on CT imaging.  Postprocedural changes and scar are present over the lateral  aspect of the proximal humerus/shoulder.  OTHER: Negative.                Impression:    CONCLUSION:  There is history of metastatic melanoma.  Postprocedural changes in the right shoulder.  Mesentery lymphadenopathy demonstrated on prior PET-CT exams continue to decrease in size.  All nodes are less FDG avid or no longer FDG avid.  No new metastatic  foci.          Dictated by (CST): Drew Adams MD on 1/09/2024 at 12:43 PM      Finalized by (CST): Drew Adams MD on 1/09/2024 at 1:03 PM            Imaging & Referrals:  None   No orders of the defined types were placed in this encounter.

## 2024-03-13 NOTE — PROGRESS NOTES
Patient here for C14D1 Opdivo.  Arrives Ambulating independently, accompanied by Family member. Labs reviewed by this RN.    Patient was evaluated today by EMIR and Treatment Nurse.    Oral medications included in this regimen:  no    Patient confirms comprehension of cancer treatment schedule:  yes    Pregnancy screening:  Not applicable    Modifications in dose or schedule:  No    Medications appearance and physical integrity checked by RN: yes.    Chemotherapy IV pump settings verified by 2 RNs:  No due to targeted therapy IV administration.  Frequency of blood return and site check throughout administration: Prior to administration and At completion of therapy     Infusion/treatment outcome:  patient tolerated treatment without incident    Education Record    Learner:  Patient  Barriers / Limitations:  Language  Method:  Reinforcement  Education / instructions given:  Plan of care and treatment regimen  Outcome:  Shows understanding    Discharged Home, Ambulating with walker, accompanied by:Family member    Patient/family verbalized understanding of future appointments: by printed AVS

## 2024-03-27 ENCOUNTER — OFFICE VISIT (OUTPATIENT)
Dept: HEMATOLOGY/ONCOLOGY | Facility: HOSPITAL | Age: 59
End: 2024-03-27
Attending: INTERNAL MEDICINE
Payer: MEDICAID

## 2024-03-27 ENCOUNTER — OFFICE VISIT (OUTPATIENT)
Dept: HEMATOLOGY/ONCOLOGY | Facility: HOSPITAL | Age: 59
End: 2024-03-27
Attending: NURSE PRACTITIONER
Payer: MEDICAID

## 2024-03-27 VITALS
BODY MASS INDEX: 36.26 KG/M2 | RESPIRATION RATE: 16 BRPM | TEMPERATURE: 98 F | HEART RATE: 63 BPM | DIASTOLIC BLOOD PRESSURE: 82 MMHG | OXYGEN SATURATION: 98 % | SYSTOLIC BLOOD PRESSURE: 140 MMHG | WEIGHT: 231 LBS | HEIGHT: 67 IN

## 2024-03-27 DIAGNOSIS — C43.61 MALIGNANT MELANOMA OF RIGHT UPPER EXTREMITY INCLUDING SHOULDER (HCC): Primary | ICD-10-CM

## 2024-03-27 DIAGNOSIS — Z51.12 ENCOUNTER FOR ANTINEOPLASTIC IMMUNOTHERAPY: ICD-10-CM

## 2024-03-27 DIAGNOSIS — C78.89 MELANOMA METASTATIC TO DIGESTIVE ORGAN (HCC): ICD-10-CM

## 2024-03-27 LAB
ALBUMIN SERPL-MCNC: 4.5 G/DL (ref 3.2–4.8)
ALBUMIN/GLOB SERPL: 1.5 {RATIO} (ref 1–2)
ALP LIVER SERPL-CCNC: 87 U/L
ALT SERPL-CCNC: 28 U/L
ANION GAP SERPL CALC-SCNC: 6 MMOL/L (ref 0–18)
AST SERPL-CCNC: 20 U/L (ref ?–34)
BASOPHILS # BLD AUTO: 0.08 X10(3) UL (ref 0–0.2)
BASOPHILS NFR BLD AUTO: 1.3 %
BILIRUB SERPL-MCNC: 0.6 MG/DL (ref 0.3–1.2)
BUN BLD-MCNC: 16 MG/DL (ref 9–23)
BUN/CREAT SERPL: 17.8 (ref 10–20)
CALCIUM BLD-MCNC: 9.5 MG/DL (ref 8.7–10.4)
CHLORIDE SERPL-SCNC: 106 MMOL/L (ref 98–112)
CO2 SERPL-SCNC: 25 MMOL/L (ref 21–32)
CREAT BLD-MCNC: 0.9 MG/DL
DEPRECATED RDW RBC AUTO: 43.6 FL (ref 35.1–46.3)
EGFRCR SERPLBLD CKD-EPI 2021: 99 ML/MIN/1.73M2 (ref 60–?)
EOSINOPHIL # BLD AUTO: 0.07 X10(3) UL (ref 0–0.7)
EOSINOPHIL NFR BLD AUTO: 1.2 %
ERYTHROCYTE [DISTWIDTH] IN BLOOD BY AUTOMATED COUNT: 12.6 % (ref 11–15)
GLOBULIN PLAS-MCNC: 3 G/DL (ref 2.8–4.4)
GLUCOSE BLD-MCNC: 180 MG/DL (ref 70–99)
HCT VFR BLD AUTO: 47.5 %
HGB BLD-MCNC: 16 G/DL
IMM GRANULOCYTES # BLD AUTO: 0.01 X10(3) UL (ref 0–1)
IMM GRANULOCYTES NFR BLD: 0.2 %
LYMPHOCYTES # BLD AUTO: 2.25 X10(3) UL (ref 1–4)
LYMPHOCYTES NFR BLD AUTO: 37.1 %
MCH RBC QN AUTO: 31.3 PG (ref 26–34)
MCHC RBC AUTO-ENTMCNC: 33.7 G/DL (ref 31–37)
MCV RBC AUTO: 92.8 FL
MONOCYTES # BLD AUTO: 0.6 X10(3) UL (ref 0.1–1)
MONOCYTES NFR BLD AUTO: 9.9 %
NEUTROPHILS # BLD AUTO: 3.05 X10 (3) UL (ref 1.5–7.7)
NEUTROPHILS # BLD AUTO: 3.05 X10(3) UL (ref 1.5–7.7)
NEUTROPHILS NFR BLD AUTO: 50.3 %
OSMOLALITY SERPL CALC.SUM OF ELEC: 290 MOSM/KG (ref 275–295)
PLATELET # BLD AUTO: 240 10(3)UL (ref 150–450)
POTASSIUM SERPL-SCNC: 4.2 MMOL/L (ref 3.5–5.1)
PROT SERPL-MCNC: 7.5 G/DL (ref 5.7–8.2)
RBC # BLD AUTO: 5.12 X10(6)UL
SODIUM SERPL-SCNC: 137 MMOL/L (ref 136–145)
WBC # BLD AUTO: 6.1 X10(3) UL (ref 4–11)

## 2024-03-27 PROCEDURE — 80053 COMPREHEN METABOLIC PANEL: CPT

## 2024-03-27 PROCEDURE — 85025 COMPLETE CBC W/AUTO DIFF WBC: CPT

## 2024-03-27 PROCEDURE — 99215 OFFICE O/P EST HI 40 MIN: CPT | Performed by: NURSE PRACTITIONER

## 2024-03-27 PROCEDURE — 96413 CHEMO IV INFUSION 1 HR: CPT

## 2024-03-27 NOTE — PROGRESS NOTES
HPI   Cole Kendrick is a 58 year old male here for follow up of   Encounter Diagnoses   Name Primary?    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    Oncology history:   Extensive outside records from multiple facilities since March 2021 were reviewed.  This is a synopsis of those records, details can be found on Care Everywhere.  Patient had presented at [facility] in [facility] March 2021 due to a mass of the right shoulder.  At that time he underwent imaging with an MRI which showed a superficial right upper arm mass which appeared to be veins within the dermis.  It measured 3.9 cm x 8.3 cm x 5.9 cm it was felt that there was likely internal process of hemorrhage within the mass.  The mass demonstrated significant enhancement and it extended to the involved adjacent thickened areas of the dermis.  This mass was abutting the deltoid muscle and mildly effacing the deltoid muscle but not invading it.  There is also evidence of 13 mm short axis diameter right axillary lymph node which appeared to have a thickened cortex and was felt to be abnormal.  Dedicated ultrasound of the right axilla was recommended for further evaluation.  There were other smaller lymph nodes adjacent to the area.  Patient subsequently underwent right axillary lymph node ultrasound guided biopsy, the area contained multiple and large lymph nodes which were measuring between 3.5 and 3.2 cm.  There is at least 3 of them.  Pathology report showed lymph node tissue showing features consistent with nonspecific reactive lymphoid hyperplasia, there is no evidence of metastatic disease, nor a lymphoproliferative process or malignancy.     Patient was then referred to cancer center at [facility] in [facility], and was evaluated by her surgical oncologist Dr. Fabián Dawn.  Per review of his  consultation report, the patient had stated he had noted a new (mole) on the right arm 9 months prior to his original presentation and had enlarged over several months and began to bleed.  It was not painful and was not causing any neuromuscular issues.  States that the patient then had a biopsy performed on 3/3/2021 in 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  We did discuss causes the results of the MRI above.  I was discussed for the patient to proceed with a radical resection of the right upper arm mass with placement of the wound VAC after performing the wide excision of the mass.  Surgical resection was performed on 5/26/2021 with en bloc resection of 12 cm with partial resection of the underlying right deltoid muscle.  Final wound measured 15 cm in diameter by 4 cm deep, and a wound VAC in place.  The pathology report showed that this was consistent with a malignant melanoma that measured 10.5 cm and was focally in the epidermis abutting ulcer with minute focus of possible melanoma in situ.  BRAF V600E was positive.  As the tumor staging summary, the histologic type was not determined.  The Breslow depth could not be determined.  There was ulceration present.  The mitotic rate was greater than 1/millimeter square.  Lymphovascular invasion was present.  Tumor infiltrating lymphocytes were present but nonbrisk.  There is no tumor regression noted.  The peripheral margins, and the deep margins were negative for invasive melanoma.  Tumor was 15 mm from the closest peripheral margin and 10 mm from the closest deep margin.  Plastic surgery was consulted for wound closure with skin grafting.  Patient underwent skin grafting on 6/8/2021.  Donor site was from the right thigh.     Patient was then seeking oncologic care in [facility] in [facility].  The records from that organization are not available on Care  Everywhere.  The patient states that he was receiving treatment with the local oncologist, Dr. Arce, with about 8 pills a day he was taking twice a day.  He states that he would pick them up at his doctor's office.  He states that he had had a PET scan which is showed positive lymph nodes in his right axilla.  On records available in care everywhere on 3/3/2022, there was a CT scan of the chest, abdomen and pelvis with contrast, which was compared to a PET/CT from September 16, 2021.  This states that on the prior PET/CT from September 2021 there had been enlarged right axillary lymphadenopathy that had measured 2.6 x 2.1 cm, and on the current study from March 3, 2022 it had resolved.  There was no other evidence seen of metastatic disease.  There was a 1 cm flash enhancing lesion in the right anterior lobe of the liver which was felt to be a hemangioma.     Then on July 25, 2022, it was noted that the patient had iron deficiency anemia.  At that time, on the note from the infusion center on 9/8/2022, at which time the patient was receiving Venofer infusions, the only medication listed on his med list was tramadol.  He did receive infusion of 300 mg of Venofer x6 doses as an outpatient.  Last dose was given on 10/27/2022.     On November 7, 2022, the patient received a blood transfusion for hemoglobin of 6.2, transfusion occurred on 11/8/2022.  Patient was then admitted on 12/21/2023 and discharged on 12/23/2023 to [facility], due to secondary acute anemia from blood loss secondary to GI bleed.  At that time he had presented with several weeks of dizziness, weakness, and left lower extremity swelling.  His oncologist recommended he go to the ED for evaluation.  He did a left lower extremity Doppler which was negative for DVT.  Hemoglobin at the time was 3.2.  Patient had complained of several weeks of painless red-maroon or black-colored stools which were not often.  He denied any abdominal  pain.  He denied any nausea or vomiting.  Patient received a total of 6 units of packed red cells and underwent an EGD which revealed 5 gastric ulcers with no active bleeding.  He was placed on 3 months of PPI therapy twice daily and was recommended for repeat EGD in 2 months.  At that time, the only medication listed was metformin.   Patient was then readmitted on 1/4/2023 and discharged on 1/6/2023, due to a recurrent GI bleed.  At that time, he had reported an abrupt onset of a sensation of fatigue, diaphoresis and dizziness, and then started having dark stools.  When he presented to the ED, with hypotension and hemoglobin of 8.1.  There was concern for recurrent GI bleed, however there is no evidence of overt GI bleed.  He was transfused 2 units of packed red cells, with hemoglobin up to 9.3 on day of discharge.  It was not recommended to repeat endoscopy at the time.  He was recommended to continue with the pantoprazole twice daily.  He had also had a tagged red cell scan during the first admission which was negative.       Patient presented back to his oncologist on 01/11/2023, with a hemoglobin of 6.5.  On 1/12/2023 he was then transfused 2 units of packed red cells at the outpatient infusion center at [facility].  Subsequent hemoglobin on 1/19/2023 was 7.1.  She did have repeat iron studies on 1/23/2023, with a ferritin of 12, percent iron saturation of 4%, LDH elevated to 364, with a total bilirubin of 0.4.  He again was managed with Venofer 300 mg infusions weekly x9 weeks.  With last dose administered on 5/4/2023.     Patient then had PET/CT on 2/02/2023, which at that time showed 2 thickened hypermetabolic small bowel loops with SUV max of 11.07 and 13.72.  They have felt that these findings were suspicious for an intussusception and a CT scan of the abdomen pelvis was recommended with and without contrast.  They still noted that there was no right axillary lymphadenopathy.     Patient was then  readmitted on 5/17/2023 through 5/18/2023, again due to acute on chronic blood loss anemia and received 2 units of packed red cells.  It was suspected to be an upper GI bleed and was referred for outpatient capsule endoscopy.  At the time of presentation on 5/17/2023 he had offered a 4-day history of black stools and dizziness in the morning.  When he presented to the ED his hemoglobin was 5.7.  After transfusion of 2 units of packed red cells his hemoglobin was up to 7.1.  Last hemoglobin on file as outpatient on 5/25/2023 was 8.4 with ferritin of 11.     Presented to [facility] ER on 6/28/23 with c/o weakness, epigastric pain and melanotic stools.  Hgb 4.6.  PMH c/w DM, gastric ulcers with h/o GI bleed s/p IV iron.  He underwent an EGD which did not show any evidence of upper GI bleeding. There were 2 small superficial ulcers/erosions in the duodenal bulb which were not felt to be the cause of the profound anemia with hemoglobin of less than 5 on admission. Patient was recommended to have a CT enterography due to PET/CT on 2/2/2023 with 2 thickened hypermetabolic masses and small bowel which were felt to be secondary to intussusception. Capsule endoscopy was to be considered pending results. CT enterography was performed on 6/29/2023, which showed an 8.7 cm area of masslike thickening involving a loop of small bowel within the right lower quadrant. There was an additional 7 cm area of masslike thickening involving a loop of small bowel within the left lower quadrant. These findings were felt to be suspicious of metastatic disease to small bowel given best history of melanoma.     S/p en bloc small bowel resection and partial omentectomy ([facility]) on 6/30/23 - two separate foci of metastatic melanoma, 7.5 cm and 6.5 cm, BRAF mutant.      S/p cycle 4 Ipilimumab with 3 mg/Nivolumab 1 mg.  Tolerated very well.    Currently s/p cycle 14 single agent nivolumab    Fatigue:  No    Fevers:  No    Appetite/taste changes:   No    Mucositis:  No    Weight changes:  No    Nausea/vomiting:  No    Diarrhea:  No    Constipation:  Yes no bm x 4 days took Miralax. Had to strain. Takes every other day. Recommended 1/2 cap daily to keep bowels moving.      Peripheral neuropathy:  No       Lives in [place], [place].    ECOG PS 0    Review of Systems:   Review of Systems   Constitutional:  Negative for appetite change, fatigue and fever.   HENT:   Negative for mouth sores.    Eyes:  Negative for eye problems.   Respiratory:  Negative for cough and shortness of breath.    Cardiovascular:  Negative for chest pain, leg swelling and palpitations.   Gastrointestinal:  Positive for constipation (no BM x 4 days - relieved with Miralax). Negative for abdominal pain, blood in stool, diarrhea and nausea.   Genitourinary:  Negative for difficulty urinating, dysuria, frequency and hematuria.    Musculoskeletal:  Positive for back pain (from straining for constipation). Negative for arthralgias (on and off at different joints).   Skin:  Negative for itching and rash.   Neurological:  Negative for dizziness, extremity weakness and headaches.   Hematological:  Negative for adenopathy. Does not bruise/bleed easily.   Psychiatric/Behavioral:  Positive for sleep disturbance (with constipation).          No current outpatient medications on file.     Allergies:   No Known Allergies    Past Medical History:   Diagnosis Date    Anemia     with multiple blood transfusion    GIB (gastrointestinal bleeding) 12/2022    Melanoma (HCC) 06/01/2021    right shoulder s/p surgical excision and skin graft, adjuvant BRAF/MEK inhibitors for 1.5 yrs    Personal history of antineoplastic chemotherapy      Past Surgical History:   Procedure Laterality Date    OTHER SURGICAL HISTORY      small intestine surgery    UPPER GI ENDOSCOPY,EXAM  12/01/2022     Social History     Socioeconomic History    Marital status: Single   Tobacco Use    Smoking status: Never    Smokeless tobacco: Never    Vaping Use    Vaping Use: Never used   Substance and Sexual Activity    Alcohol use: Not Currently    Drug use: Never       No family history on file.      PHYSICAL EXAM:    BP (!) 132/101 (BP Location: Left arm, Patient Position: Sitting, Cuff Size: large)   Pulse 63   Temp 97.7 °F (36.5 °C) (Oral)   Resp 16   Ht 1.702 m (5' 7\")   Wt 104.8 kg (231 lb)   SpO2 98%   BMI 36.18 kg/m²   Wt Readings from Last 6 Encounters:   03/13/24 105.8 kg (233 lb 3.2 oz)   02/28/24 104.2 kg (229 lb 11.2 oz)   02/14/24 103.3 kg (227 lb 12.8 oz)   01/31/24 102.3 kg (225 lb 8 oz)   01/17/24 100.2 kg (221 lb)   01/03/24 100.7 kg (222 lb)     Physical Exam  General: Patient is alert, not in acute distress.    HEENT: EOMs intact. PERRL, Anicteric.  MMM.   Neck: No JVD. No palpable lymphadenopathy. Neck is supple, surgical scar.  Chest: Clear to auscultation.  Heart: Regular rate and rhythm.   Abdomen: Soft, non tender with good bowel sounds.    Extremities: No edema.  Neurological: Grossly intact.   Psych/Depression: nl        ASSESSMENT/PLAN:     1. Malignant melanoma of right upper extremity including shoulder (HCC)    2. 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 Encounter for antineoplastic immunotherapy          Malignant melanoma of right upper extremity including shoulder (HCC)  Proceed with treatment with nivolumab and ipilimumab x 4 cycles followed by maintenance nivolumab for total of 18 to 24 months.  Discussed goal of treatment is still potentially curative with these agents.      S/p cycle 4 Ipilimumab 3 mg/Nivolumab 1 mg.      Tumor assessment after the fourth cycle of treatment with marked response to therapy.  Tumor assessment after cycle 10 with very minimal activity in the LN in the abdomen.    s/p cycle 14 maintenance nivolumab 3 mg/kg every 2 weeks.       Proceed with cycle 15.  Restaging PET/CT on 4/11/24    Goal is to complete a total of 18 months to 24 months of treatment from initiation of therapy.       New MD [place]    No orders of the defined types were placed in this encounter.    Call prn.  Follow-up in 2 weeks    MDM: high, malignancy, life threatening. Drug therapy requiring intensive monitoring for tox    Results From Past 48 Hours:  Recent Results (from the past 48 hour(s))   Comp Metabolic Panel (14)    Collection Time: 03/27/24  7:29 AM   Result Value Ref Range    Glucose 180 (H) 70 - 99 mg/dL    Sodium 137 136 - 145 mmol/L    Potassium 4.2 3.5 - 5.1 mmol/L    Chloride 106 98 - 112 mmol/L    CO2 25.0 21.0 - 32.0 mmol/L    Anion Gap 6 0 - 18 mmol/L    BUN 16 9 - 23 mg/dL    Creatinine 0.90 0.70 - 1.30 mg/dL    BUN/CREA Ratio 17.8 10.0 - 20.0    Calcium, Total 9.5 8.7 - 10.4 mg/dL    Calculated Osmolality 290 275 - 295 mOsm/kg    eGFR-Cr 99 >=60 mL/min/1.73m2    ALT 28 10 - 49 U/L    AST 20 <=34 U/L    Alkaline Phosphatase 87 45 - 117 U/L    Bilirubin, Total 0.6 0.3 - 1.2 mg/dL    Total Protein 7.5 5.7 - 8.2 g/dL    Albumin 4.5 3.2 - 4.8 g/dL    Globulin  3.0 2.8 - 4.4 g/dL    A/G Ratio 1.5 1.0 - 2.0    Patient Fasting for CMP? Patient not present    CBC W/ DIFFERENTIAL    Collection Time: 03/27/24  7:29 AM   Result Value Ref Range    WBC 6.1 4.0 - 11.0 x10(3) uL    RBC 5.12 4.30 - 5.70 x10(6)uL    HGB 16.0 13.0 - 17.5 g/dL    HCT 47.5 39.0 - 53.0 %    MCV 92.8 80.0 - 100.0 fL    MCH 31.3 26.0 - 34.0 pg    MCHC 33.7 31.0 - 37.0 g/dL    RDW-SD 43.6 35.1 - 46.3 fL    RDW 12.6 11.0 - 15.0 %    .0 150.0 - 450.0 10(3)uL    Neutrophil Absolute Prelim 3.05 1.50 - 7.70 x10 (3) uL    Neutrophil Absolute 3.05 1.50 - 7.70 x10(3) uL    Lymphocyte Absolute 2.25 1.00 - 4.00 x10(3) uL    Monocyte Absolute 0.60 0.10 - 1.00 x10(3) uL    Eosinophil Absolute 0.07 0.00 - 0.70 x10(3) uL    Basophil Absolute 0.08 0.00 - 0.20 x10(3) uL    Immature Granulocyte Absolute 0.01 0.00 - 1.00 x10(3) uL    Neutrophil % 50.3 %    Lymphocyte % 37.1 %    Monocyte % 9.9 %    Eosinophil % 1.2 %    Basophil % 1.3 %    Immature  Granulocyte % 0.2 %       PET (NON-STAND)SCANS(SKULL TO TOES EX:MELANOMA)(CPT=78816) [[id number]] Collected: 01/09/24 1303  Order Status: Completed Updated: 01/09/24 1304  Narrative:    PROCEDURE: PET/CT (NON-STAND) SCANS(SKULL TO TOES) (CPT=78816)     COMPARISON: [facility], PET (NON-STAND) SCANS(SKULL TO TOES) (CPT=78816), 8/01/2023, 7:28 AM.  [facility], PET (NON-STAND) SCANS(SKULL TO TOES) (CPT=78816), 10/18/2023, 7:29 AM.     INDICATIONS: Subsequent treatment strategy.  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  Whole body PET/CT imaging was performed of the entire body, skull to feet, with multi-planar imaging without oral or  intravenous contrast material, using a dedicated integrated PET/CT scanner.       PATIENT BLOOD GLUCOSE: 116 mg/dL.    UPTAKE TIME: 66 minutes for whole body imaging     FINDINGS:  Attenuation corrected and non-attenuation corrected images were reviewed from the head to the feet.  Mediastinal blood pool is 2.0 max SUV, and hepatic blood pool is 2.9 max SUV.     HEAD/NECK: Left submandibular gland has been removed or is atrophic.  No pathological FDG activity.  LUNGS: No pathological FDG activity.  No suspicious nodules on CT imaging.  MEDIASTINUM/KIERSTEN: No lymphadenopathy.  No pathological FDG activity.    CHEST WALL/AXILLA: No lymphadenopathy.  No pathological FDG activity.    ABDOMEN/ PELVIS: Prior small bowel resection in the right lower quadrant with reanastomosis.  No obstruction or inflammation the surgical site.  12 x 17 mm (currently the 2.2 max SUV, previously 16 x 22 mm and 3.7 max SUV) ovoid solid nodule in the  central aspect of the abdomen at the level the umbilicus.  There is a 6 x 10 mm (currently 0.9 max SUV,  previously 12 x 10 mm and 2.4 max SUV) lymph node in the central abdominal mesentery slightly left of midline.  Previous exam also demonstrated a  smaller 7 x 14-mm lymph node which is no longer present.  Cortical based low density foci in the lower pole of the right kidney compatible with cysts.  Small bilateral fat containing inguinal hernias.  MUSCULOSKELETAL: Scattered mild degenerative change within the spine.  No pathological FDG activity.  No suspicious lesions on CT imaging.  LOWER EXTREMITIES: Degenerative changes in both knees.  Small left knee effusion and moderate-sized right knee effusion.  No pathological FDG activity.  No suspicious lesions on CT imaging.  Postprocedural changes and scar are present over the lateral  aspect of the proximal humerus/shoulder.  OTHER: Negative.                Impression:    CONCLUSION:  There is history of metastatic melanoma.  Postprocedural changes in the right shoulder.  Mesentery lymphadenopathy demonstrated on prior PET-CT exams continue to decrease in size.  All nodes are less FDG avid or no longer FDG avid.  No new metastatic  foci.          Dictated by (CST): Drew Adams MD on 1/09/2024 at 12:43 PM      Finalized by (CST): Drew Adams MD on 1/09/2024 at 1:03 PM            Imaging & Referrals:  None   No orders of the defined types were placed in this encounter.

## 2024-03-27 NOTE — PROGRESS NOTES
Patient here for C15D1 Opdivo.        Arrives Ambulating independently, accompanied by Family member. Labs reviewed by this RN.    Patient was evaluated today by EMIR and Treatment Nurse.    Oral medications included in this regimen:  no    Patient confirms comprehension of cancer treatment schedule:  yes    Pregnancy screening:  Not applicable    Modifications in dose or schedule:  No    Medications appearance and physical integrity checked by RN: yes.    Chemotherapy IV pump settings verified by 2 RNs:  No due to targeted therapy IV administration.  Frequency of blood return and site check throughout administration: Prior to administration and At completion of therapy     Infusion/treatment outcome:  patient tolerated treatment without incident    Education Record    Learner:  Patient  Barriers / Limitations:  Language  Method:  Reinforcement  Education / instructions given:  Plan of care and treatment regimen  Outcome:  Shows understanding    Discharged Home, Ambulating with walker, accompanied by:Family member    Patient/family verbalized understanding of future appointments: by printed AVS

## 2024-04-10 ENCOUNTER — APPOINTMENT (OUTPATIENT)
Dept: HEMATOLOGY/ONCOLOGY | Facility: HOSPITAL | Age: 59
End: 2024-04-10
Attending: INTERNAL MEDICINE
Payer: MEDICAID

## 2024-04-11 ENCOUNTER — HOSPITAL ENCOUNTER (OUTPATIENT)
Dept: NUCLEAR MEDICINE | Facility: HOSPITAL | Age: 59
Discharge: HOME | End: 2024-04-11
Attending: PHYSICIAN ASSISTANT
Payer: MEDICAID

## 2024-04-11 ENCOUNTER — OFFICE VISIT (OUTPATIENT)
Dept: HEMATOLOGY/ONCOLOGY | Facility: HOSPITAL | Age: 59
End: 2024-04-11
Attending: INTERNAL MEDICINE
Payer: MEDICAID

## 2024-04-11 VITALS
TEMPERATURE: 98 F | SYSTOLIC BLOOD PRESSURE: 131 MMHG | RESPIRATION RATE: 18 BRPM | OXYGEN SATURATION: 98 % | HEART RATE: 62 BPM | BODY MASS INDEX: 36.57 KG/M2 | HEIGHT: 67 IN | DIASTOLIC BLOOD PRESSURE: 81 MMHG | WEIGHT: 233 LBS

## 2024-04-11 DIAGNOSIS — C43.61 MALIGNANT MELANOMA OF RIGHT UPPER EXTREMITY INCLUDING SHOULDER (HCC): Primary | ICD-10-CM

## 2024-04-11 DIAGNOSIS — Z51.12 ENCOUNTER FOR ANTINEOPLASTIC IMMUNOTHERAPY: ICD-10-CM

## 2024-04-11 DIAGNOSIS — C43.61: ICD-10-CM

## 2024-04-11 DIAGNOSIS — C78.89 MELANOMA METASTATIC TO DIGESTIVE ORGAN (HCC): ICD-10-CM

## 2024-04-11 LAB
ALBUMIN SERPL-MCNC: 4.6 G/DL (ref 3.2–4.8)
ALBUMIN/GLOB SERPL: 1.6 {RATIO} (ref 1–2)
ALP LIVER SERPL-CCNC: 84 U/L
ALT SERPL-CCNC: 20 U/L
ANION GAP SERPL CALC-SCNC: 6 MMOL/L (ref 0–18)
AST SERPL-CCNC: 19 U/L (ref ?–34)
BASOPHILS # BLD AUTO: 0.08 X10(3) UL (ref 0–0.2)
BASOPHILS NFR BLD AUTO: 1.5 %
BILIRUB SERPL-MCNC: 0.6 MG/DL (ref 0.3–1.2)
BUN BLD-MCNC: 13 MG/DL (ref 9–23)
BUN/CREAT SERPL: 15.3 (ref 10–20)
CALCIUM BLD-MCNC: 9.5 MG/DL (ref 8.7–10.4)
CHLORIDE SERPL-SCNC: 107 MMOL/L (ref 98–112)
CO2 SERPL-SCNC: 25 MMOL/L (ref 21–32)
CREAT BLD-MCNC: 0.85 MG/DL
DEPRECATED RDW RBC AUTO: 41.4 FL (ref 35.1–46.3)
EGFRCR SERPLBLD CKD-EPI 2021: 101 ML/MIN/1.73M2 (ref 60–?)
EOSINOPHIL # BLD AUTO: 0.06 X10(3) UL (ref 0–0.7)
EOSINOPHIL NFR BLD AUTO: 1.1 %
ERYTHROCYTE [DISTWIDTH] IN BLOOD BY AUTOMATED COUNT: 12.5 % (ref 11–15)
GLOBULIN PLAS-MCNC: 2.8 G/DL (ref 2.8–4.4)
GLUCOSE BLD-MCNC: 175 MG/DL (ref 70–99)
GLUCOSE BLDC GLUCOMTR-MCNC: 187 MG/DL (ref 70–99)
HCT VFR BLD AUTO: 44.4 %
HGB BLD-MCNC: 16.1 G/DL
IMM GRANULOCYTES # BLD AUTO: 0.03 X10(3) UL (ref 0–1)
IMM GRANULOCYTES NFR BLD: 0.6 %
LYMPHOCYTES # BLD AUTO: 2.14 X10(3) UL (ref 1–4)
LYMPHOCYTES NFR BLD AUTO: 39.5 %
MCH RBC QN AUTO: 32.9 PG (ref 26–34)
MCHC RBC AUTO-ENTMCNC: 36.3 G/DL (ref 31–37)
MCV RBC AUTO: 90.8 FL
MONOCYTES # BLD AUTO: 0.6 X10(3) UL (ref 0.1–1)
MONOCYTES NFR BLD AUTO: 11.1 %
NEUTROPHILS # BLD AUTO: 2.51 X10 (3) UL (ref 1.5–7.7)
NEUTROPHILS # BLD AUTO: 2.51 X10(3) UL (ref 1.5–7.7)
NEUTROPHILS NFR BLD AUTO: 46.2 %
OSMOLALITY SERPL CALC.SUM OF ELEC: 290 MOSM/KG (ref 275–295)
PLATELET # BLD AUTO: 242 10(3)UL (ref 150–450)
POTASSIUM SERPL-SCNC: 4.5 MMOL/L (ref 3.5–5.1)
PROT SERPL-MCNC: 7.4 G/DL (ref 5.7–8.2)
RBC # BLD AUTO: 4.89 X10(6)UL
SODIUM SERPL-SCNC: 138 MMOL/L (ref 136–145)
T4 FREE SERPL-MCNC: 1.3 NG/DL (ref 0.8–1.7)
TSI SER-ACNC: 2.73 MIU/ML (ref 0.55–4.78)
WBC # BLD AUTO: 5.4 X10(3) UL (ref 4–11)

## 2024-04-11 PROCEDURE — 84439 ASSAY OF FREE THYROXINE: CPT

## 2024-04-11 PROCEDURE — 96413 CHEMO IV INFUSION 1 HR: CPT

## 2024-04-11 PROCEDURE — 78816 PET IMAGE W/CT FULL BODY: CPT | Performed by: PHYSICIAN ASSISTANT

## 2024-04-11 PROCEDURE — 82962 GLUCOSE BLOOD TEST: CPT

## 2024-04-11 PROCEDURE — 84443 ASSAY THYROID STIM HORMONE: CPT

## 2024-04-11 PROCEDURE — 99215 OFFICE O/P EST HI 40 MIN: CPT | Performed by: INTERNAL MEDICINE

## 2024-04-11 PROCEDURE — 80053 COMPREHEN METABOLIC PANEL: CPT

## 2024-04-11 PROCEDURE — 85025 COMPLETE CBC W/AUTO DIFF WBC: CPT

## 2024-04-11 NOTE — PROGRESS NOTES
HPI   Cole Kendrick is a 58 year old male here for follow up of   Encounter Diagnoses   Name Primary?    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    Oncology history:   Extensive outside records from multiple facilities since March 2021 were reviewed.  This is a synopsis of those records, details can be found on Care Everywhere.  Patient had presented at [facility] in [facility] March 2021 due to a mass of the right shoulder.  At that time he underwent imaging with an MRI which showed a superficial right upper arm mass which appeared to be veins within the dermis.  It measured 3.9 cm x 8.3 cm x 5.9 cm it was felt that there was likely internal process of hemorrhage within the mass.  The mass demonstrated significant enhancement and it extended to the involved adjacent thickened areas of the dermis.  This mass was abutting the deltoid muscle and mildly effacing the deltoid muscle but not invading it.  There is also evidence of 13 mm short axis diameter right axillary lymph node which appeared to have a thickened cortex and was felt to be abnormal.  Dedicated ultrasound of the right axilla was recommended for further evaluation.  There were other smaller lymph nodes adjacent to the area.  Patient subsequently underwent right axillary lymph node ultrasound guided biopsy, the area contained multiple and large lymph nodes which were measuring between 3.5 and 3.2 cm.  There is at least 3 of them.  Pathology report showed lymph node tissue showing features consistent with nonspecific reactive lymphoid hyperplasia, there is no evidence of metastatic disease, nor a lymphoproliferative process or malignancy.     Patient was then referred to cancer center at [facility] in [facility], and was evaluated by her surgical oncologist Dr. Fabián Dawn.  Per review of his  consultation report, the patient had stated he had noted a new (mole) on the right arm 9 months prior to his original presentation and had enlarged over several months and began to bleed.  It was not painful and was not causing any neuromuscular issues.  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  We did discuss causes the results of the MRI above.  I was discussed for the patient to proceed with a radical resection of the right upper arm mass with placement of the wound VAC after performing the wide excision of the mass.  Surgical resection was performed on 5/26/2021 with en bloc resection of 12 cm with partial resection of the underlying right deltoid muscle.  Final wound measured 15 cm in diameter by 4 cm deep, and a wound VAC in place.  The pathology report showed that this was consistent with a malignant melanoma that measured 10.5 cm and was focally in the epidermis abutting ulcer with minute focus of possible melanoma in situ.  BRAF V600E was positive.  As the tumor staging summary, the histologic type was not determined.  The Breslow depth could not be determined.  There was ulceration present.  The mitotic rate was greater than 1/millimeter square.  Lymphovascular invasion was present.  Tumor infiltrating lymphocytes were present but nonbrisk.  There is no tumor regression noted.  The peripheral margins, and the deep margins were negative for invasive melanoma.  Tumor was 15 mm from the closest peripheral margin and 10 mm from the closest deep margin.  Plastic surgery was consulted for wound closure with skin grafting.  Patient underwent skin grafting on 6/8/2021.  Donor site was from the right thigh.     Patient was then seeking oncologic care in [facility] in [facility].  The records from that organization are not available on Care  Everywhere.  The patient states that he was receiving treatment with the local oncologist, Dr. Arce, with about 8 pills a day he was taking twice a day.  He states that he would pick them up at his doctor's office.  He states that he had had a PET scan which is showed positive lymph nodes in his right axilla.  On records available in care everywhere on 3/3/2022, there was a CT scan of the chest, abdomen and pelvis with contrast, which was compared to a PET/CT from September 16, 2021.  This states that on the prior PET/CT from September 2021 there had been enlarged right axillary lymphadenopathy that had measured 2.6 x 2.1 cm, and on the current study from March 3, 2022 it had resolved.  There was no other evidence seen of metastatic disease.  There was a 1 cm flash enhancing lesion in the right anterior lobe of the liver which was felt to be a hemangioma.     Then on July 25, 2022, it was noted that the patient had iron deficiency anemia.  At that time, on the note from the infusion center on 9/8/2022, at which time the patient was receiving Venofer infusions, the only medication listed on his med list was tramadol.  He did receive infusion of 300 mg of Venofer x6 doses as an outpatient.  Last dose was given on 10/27/2022.     On November 7, 2022, the patient received a blood transfusion for hemoglobin of 6.2, transfusion occurred on 11/8/2022.  Patient was then admitted on 12/21/2023 and discharged on 12/23/2023 to [facility], due to secondary acute anemia from blood loss secondary to GI bleed.  At that time he had presented with several weeks of dizziness, weakness, and left lower extremity swelling.  His oncologist recommended he go to the ED for evaluation.  He did a left lower extremity Doppler which was negative for DVT.  Hemoglobin at the time was 3.2.  Patient had complained of several weeks of painless red-maroon or black-colored stools which were not often.  He denied any abdominal  pain.  He denied any nausea or vomiting.  Patient received a total of 6 units of packed red cells and underwent an EGD which revealed 5 gastric ulcers with no active bleeding.  He was placed on 3 months of PPI therapy twice daily and was recommended for repeat EGD in 2 months.  At that time, the only medication listed was metformin.   Patient was then readmitted on 1/4/2023 and discharged on 1/6/2023, due to a recurrent GI bleed.  At that time, he had reported an abrupt onset of a sensation of fatigue, diaphoresis and dizziness, and then started having dark stools.  When he presented to the ED, with hypotension and hemoglobin of 8.1.  There was concern for recurrent GI bleed, however there is no evidence of overt GI bleed.  He was transfused 2 units of packed red cells, with hemoglobin up to 9.3 on day of discharge.  It was not recommended to repeat endoscopy at the time.  He was recommended to continue with the pantoprazole twice daily.  He had also had a tagged red cell scan during the first admission which was negative.       Patient presented back to his oncologist on 01/11/2023, with a hemoglobin of 6.5.  On 1/12/2023 he was then transfused 2 units of packed red cells at the outpatient infusion center at [facility].  Subsequent hemoglobin on 1/19/2023 was 7.1.  She did have repeat iron studies on 1/23/2023, with a ferritin of 12, percent iron saturation of 4%, LDH elevated to 364, with a total bilirubin of 0.4.  He again was managed with Venofer 300 mg infusions weekly x9 weeks.  With last dose administered on 5/4/2023.     Patient then had PET/CT on 2/02/2023, which at that time showed 2 thickened hypermetabolic small bowel loops with SUV max of 11.07 and 13.72.  They have felt that these findings were suspicious for an intussusception and a CT scan of the abdomen pelvis was recommended with and without contrast.  They still noted that there was no right axillary lymphadenopathy.     Patient was then  readmitted on 5/17/2023 through 5/18/2023, again due to acute on chronic blood loss anemia and received 2 units of packed red cells.  It was suspected to be an upper GI bleed and was referred for outpatient capsule endoscopy.  At the time of presentation on 5/17/2023 he had offered a 4-day history of black stools and dizziness in the morning.  When he presented to the ED his hemoglobin was 5.7.  After transfusion of 2 units of packed red cells his hemoglobin was up to 7.1.  Last hemoglobin on file as outpatient on 5/25/2023 was 8.4 with ferritin of 11.     Presented to [facility] ER on 6/28/23 with c/o weakness, epigastric pain and melanotic stools.  Hgb 4.6.  PMH c/w DM, gastric ulcers with h/o GI bleed s/p IV iron.  He underwent an EGD which did not show any evidence of upper GI bleeding. There were 2 small superficial ulcers/erosions in the duodenal bulb which were not felt to be the cause of the profound anemia with hemoglobin of less than 5 on admission. Patient was recommended to have a CT enterography due to PET/CT on 2/2/2023 with 2 thickened hypermetabolic masses and small bowel which were felt to be secondary to intussusception. Capsule endoscopy was to be considered pending results. CT enterography was performed on 6/29/2023, which showed an 8.7 cm area of masslike thickening involving a loop of small bowel within the right lower quadrant. There was an additional 7 cm area of masslike thickening involving a loop of small bowel within the left lower quadrant. These findings were felt to be suspicious of metastatic disease to small bowel given best history of melanoma.     S/p en bloc small bowel resection and partial omentectomy ([facility] on 6/30/23 - two separate foci of metastatic melanoma, 7.5 cm and 6.5 cm, BRAF mutant.      S/p cycle 4 Ipilimumab with 3 mg/Nivolumab 1 mg.  Tolerated very well.    Currently s/p cycle 15 single agent nivolumab. Had PET/CT today.    Fatigue:  No    Fevers:   No    Appetite/taste changes:  No    Mucositis:  No    Weight changes:  No    Nausea/vomiting:  No    Diarrhea:  No    Constipation:  No    Peripheral neuropathy:  No       Lives in [place], [place].    ECOG PS 0    Review of Systems:   Review of Systems   Eyes:  Negative for eye problems.   Respiratory:  Negative for cough and shortness of breath.    Cardiovascular:  Negative for chest pain, leg swelling and palpitations.   Gastrointestinal:  Negative for abdominal pain and blood in stool.   Genitourinary:  Negative for difficulty urinating, dysuria, frequency and hematuria.    Musculoskeletal:  Negative for arthralgias (on and off at different joints) and back pain.   Skin:  Negative for itching and rash.   Neurological:  Negative for dizziness, extremity weakness and headaches.   Hematological:  Negative for adenopathy. Does not bruise/bleed easily.   Psychiatric/Behavioral:  Negative for sleep disturbance.          No current outpatient medications on file.     Allergies:   No Known Allergies    Past Medical History:    Anemia    with multiple blood transfusion    GIB (gastrointestinal bleeding)    Melanoma (HCC)    right shoulder s/p surgical excision and skin graft, adjuvant BRAF/MEK inhibitors for 1.5 yrs    Personal history of antineoplastic chemotherapy     Past Surgical History:   Procedure Laterality Date    Other surgical history      small intestine surgery    Upper gi endoscopy,exam  12/01/2022     Social History     Socioeconomic History    Marital status: Single   Tobacco Use    Smoking status: Never    Smokeless tobacco: Never   Vaping Use    Vaping status: Never Used   Substance and Sexual Activity    Alcohol use: Not Currently    Drug use: Never       History reviewed. No pertinent family history.      PHYSICAL EXAM:    /81 (BP Location: Left arm, Patient Position: Sitting, Cuff Size: large)   Pulse 62   Temp 98.3 °F (36.8 °C) (Oral)   Resp 18   Ht 1.702 m (5' 7\")   Wt 105.7 kg (233 lb)    SpO2 98%   BMI 36.49 kg/m²   Wt Readings from Last 6 Encounters:   04/11/24 105.7 kg (233 lb)   03/27/24 104.8 kg (231 lb)   03/13/24 105.8 kg (233 lb 3.2 oz)   02/28/24 104.2 kg (229 lb 11.2 oz)   02/14/24 103.3 kg (227 lb 12.8 oz)   01/31/24 102.3 kg (225 lb 8 oz)     Physical Exam  General: Patient is alert, not in acute distress.    HEENT: EOMs intact. PERRL, Anicteric.  MMM.   Neck: No JVD. No palpable lymphadenopathy. Neck is supple, surgical scar.  Chest: Clear to auscultation.  Heart: Regular rate and rhythm.   Abdomen: Soft, non tender with good bowel sounds.  Surgical scars.  Extremities: No edema.  Neurological: Grossly intact.   Psych/Depression: nl        ASSESSMENT/PLAN:     1. Malignant melanoma of right upper extremity including shoulder (HCC)    2. 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 Encounter for antineoplastic immunotherapy        Malignant melanoma of right upper extremity including shoulder (HCC)  Proceed with treatment with nivolumab and ipilimumab x 4 cycles followed by maintenance nivolumab for total of 18 to 24 months.  Discussed goal of treatment is still potentially curative with these agents.      S/p cycle 4 Ipilimumab 3 mg/Nivolumab 1 mg.      Tumor assessment after the fourth cycle of treatment with marked response to therapy.  Tumor assessment after cycle 10 with very minimal activity in the LN in the abdomen.    S/p cycle 15 maintenance nivolumab 3 mg/kg every 2 weeks.   PET/CT done today.  I personally reviewed the images and compared to last PET/CT 3 months ago.  Still [place].  Official report pending.    Proceed with cycle 16.  Restaging PET/CT due in July of 2024.    Goal is to complete a total of 18 months to 24 months of treatment from initiation of therapy.  24 months will be in August of 2025.    No orders of the defined types were placed in this encounter.    Call prn.  Follow-up in 2 weeks    MDM: high, malignancy, life threatening. Drug therapy requiring  intensive monitoring for tox    Results From Past 48 Hours:  Recent Results (from the past 48 hour(s))   POCT Glucose    Collection Time: 04/11/24  7:10 AM   Result Value Ref Range    POC Glucose  187 (H) 70 - 99 mg/dL   Comp Metabolic Panel (14)    Collection Time: 04/11/24  9:01 AM   Result Value Ref Range    Glucose 175 (H) 70 - 99 mg/dL    Sodium 138 136 - 145 mmol/L    Potassium 4.5 3.5 - 5.1 mmol/L    Chloride 107 98 - 112 mmol/L    CO2 25.0 21.0 - 32.0 mmol/L    Anion Gap 6 0 - 18 mmol/L    BUN 13 9 - 23 mg/dL    Creatinine 0.85 0.70 - 1.30 mg/dL    BUN/CREA Ratio 15.3 10.0 - 20.0    Calcium, Total 9.5 8.7 - 10.4 mg/dL    Calculated Osmolality 290 275 - 295 mOsm/kg    eGFR-Cr 101 >=60 mL/min/1.73m2    ALT 20 10 - 49 U/L    AST 19 <=34 U/L    Alkaline Phosphatase 84 45 - 117 U/L    Bilirubin, Total 0.6 0.3 - 1.2 mg/dL    Total Protein 7.4 5.7 - 8.2 g/dL    Albumin 4.6 3.2 - 4.8 g/dL    Globulin  2.8 2.8 - 4.4 g/dL    A/G Ratio 1.6 1.0 - 2.0    Patient Fasting for CMP? Patient not present    TSH [E]    Collection Time: 04/11/24  9:01 AM   Result Value Ref Range    TSH 2.735 0.550 - 4.780 mIU/mL   T4 FREE [E]    Collection Time: 04/11/24  9:01 AM   Result Value Ref Range    Free T4 1.3 0.8 - 1.7 ng/dL   CBC W/ DIFFERENTIAL    Collection Time: 04/11/24  9:01 AM   Result Value Ref Range    WBC 5.4 4.0 - 11.0 x10(3) uL    RBC 4.89 4.30 - 5.70 x10(6)uL    HGB 16.1 13.0 - 17.5 g/dL    HCT 44.4 39.0 - 53.0 %    MCV 90.8 80.0 - 100.0 fL    MCH 32.9 26.0 - 34.0 pg    MCHC 36.3 31.0 - 37.0 g/dL    RDW-SD 41.4 35.1 - 46.3 fL    RDW 12.5 11.0 - 15.0 %    .0 150.0 - 450.0 10(3)uL    Neutrophil Absolute Prelim 2.51 1.50 - 7.70 x10 (3) uL    Neutrophil Absolute 2.51 1.50 - 7.70 x10(3) uL    Lymphocyte Absolute 2.14 1.00 - 4.00 x10(3) uL    Monocyte Absolute 0.60 0.10 - 1.00 x10(3) uL    Eosinophil Absolute 0.06 0.00 - 0.70 x10(3) uL    Basophil Absolute 0.08 0.00 - 0.20 x10(3) uL    Immature Granulocyte Absolute 0.03  0.00 - 1.00 x10(3) uL    Neutrophil % 46.2 %    Lymphocyte % 39.5 %    Monocyte % 11.1 %    Eosinophil % 1.1 %    Basophil % 1.5 %    Immature Granulocyte % 0.6 %

## 2024-04-11 NOTE — PROGRESS NOTES
Pt here for C16D1 Drug(s)OPDIVO.  Arrives Ambulating independently, accompanied by Self     Patient was evaluated today by MD.    Oral medications included in this regimen:  no    Patient confirms comprehension of cancer treatment schedule:  yes    Pregnancy screening:  Not applicable    Modifications in dose or schedule:  No    Medications appearance and physical integrity checked by RN: yes.    Chemotherapy IV pump settings verified by 2 RNs:  N/A.  Frequency of blood return and site check throughout administration: Prior to administration and At completion of therapy     Infusion/treatment outcome:  patient tolerated treatment without incident    Education Record    Learner:  Patient  Barriers / Limitations:  None  Method:  Discussion  Education / instructions given:  POC  Outcome:  Shows understanding    Discharged Home, Ambulating independently, accompanied by:Self    Patient/family verbalized understanding of future appointments: by printed AVS

## 2024-04-25 ENCOUNTER — NURSE ONLY (OUTPATIENT)
Dept: HEMATOLOGY/ONCOLOGY | Facility: HOSPITAL | Age: 59
End: 2024-04-25
Attending: INTERNAL MEDICINE
Payer: MEDICAID

## 2024-04-25 VITALS
DIASTOLIC BLOOD PRESSURE: 86 MMHG | HEART RATE: 59 BPM | HEIGHT: 67 IN | RESPIRATION RATE: 16 BRPM | OXYGEN SATURATION: 100 % | SYSTOLIC BLOOD PRESSURE: 142 MMHG | WEIGHT: 233.19 LBS | BODY MASS INDEX: 36.6 KG/M2 | TEMPERATURE: 98 F

## 2024-04-25 DIAGNOSIS — C43.61 MALIGNANT MELANOMA OF RIGHT UPPER EXTREMITY INCLUDING SHOULDER (HCC): Primary | ICD-10-CM

## 2024-04-25 DIAGNOSIS — C78.89 MELANOMA METASTATIC TO DIGESTIVE ORGAN (HCC): ICD-10-CM

## 2024-04-25 DIAGNOSIS — Z51.12 ENCOUNTER FOR ANTINEOPLASTIC IMMUNOTHERAPY: ICD-10-CM

## 2024-04-25 LAB
ALBUMIN SERPL-MCNC: 4.5 G/DL (ref 3.2–4.8)
ALBUMIN/GLOB SERPL: 1.6 {RATIO} (ref 1–2)
ALP LIVER SERPL-CCNC: 85 U/L
ALT SERPL-CCNC: 20 U/L
ANION GAP SERPL CALC-SCNC: 7 MMOL/L (ref 0–18)
AST SERPL-CCNC: 18 U/L (ref ?–34)
BASOPHILS # BLD AUTO: 0.07 X10(3) UL (ref 0–0.2)
BASOPHILS NFR BLD AUTO: 1.2 %
BILIRUB SERPL-MCNC: 0.8 MG/DL (ref 0.3–1.2)
BUN BLD-MCNC: 16 MG/DL (ref 9–23)
BUN/CREAT SERPL: 18 (ref 10–20)
CALCIUM BLD-MCNC: 9.6 MG/DL (ref 8.7–10.4)
CHLORIDE SERPL-SCNC: 105 MMOL/L (ref 98–112)
CO2 SERPL-SCNC: 26 MMOL/L (ref 21–32)
CREAT BLD-MCNC: 0.89 MG/DL
DEPRECATED RDW RBC AUTO: 42.8 FL (ref 35.1–46.3)
EGFRCR SERPLBLD CKD-EPI 2021: 99 ML/MIN/1.73M2 (ref 60–?)
EOSINOPHIL # BLD AUTO: 0.07 X10(3) UL (ref 0–0.7)
EOSINOPHIL NFR BLD AUTO: 1.2 %
ERYTHROCYTE [DISTWIDTH] IN BLOOD BY AUTOMATED COUNT: 12.5 % (ref 11–15)
FASTING STATUS PATIENT QL REPORTED: NO
GLOBULIN PLAS-MCNC: 2.8 G/DL (ref 2.8–4.4)
GLUCOSE BLD-MCNC: 184 MG/DL (ref 70–99)
HCT VFR BLD AUTO: 44.8 %
HGB BLD-MCNC: 15.9 G/DL
IMM GRANULOCYTES # BLD AUTO: 0.02 X10(3) UL (ref 0–1)
IMM GRANULOCYTES NFR BLD: 0.4 %
LYMPHOCYTES # BLD AUTO: 2.11 X10(3) UL (ref 1–4)
LYMPHOCYTES NFR BLD AUTO: 37.1 %
MCH RBC QN AUTO: 32.8 PG (ref 26–34)
MCHC RBC AUTO-ENTMCNC: 35.5 G/DL (ref 31–37)
MCV RBC AUTO: 92.4 FL
MONOCYTES # BLD AUTO: 0.57 X10(3) UL (ref 0.1–1)
MONOCYTES NFR BLD AUTO: 10 %
NEUTROPHILS # BLD AUTO: 2.85 X10 (3) UL (ref 1.5–7.7)
NEUTROPHILS # BLD AUTO: 2.85 X10(3) UL (ref 1.5–7.7)
NEUTROPHILS NFR BLD AUTO: 50.1 %
OSMOLALITY SERPL CALC.SUM OF ELEC: 292 MOSM/KG (ref 275–295)
PLATELET # BLD AUTO: 229 10(3)UL (ref 150–450)
POTASSIUM SERPL-SCNC: 4.4 MMOL/L (ref 3.5–5.1)
PROT SERPL-MCNC: 7.3 G/DL (ref 5.7–8.2)
RBC # BLD AUTO: 4.85 X10(6)UL
SODIUM SERPL-SCNC: 138 MMOL/L (ref 136–145)
WBC # BLD AUTO: 5.7 X10(3) UL (ref 4–11)

## 2024-04-25 PROCEDURE — 80053 COMPREHEN METABOLIC PANEL: CPT

## 2024-04-25 PROCEDURE — 99215 OFFICE O/P EST HI 40 MIN: CPT | Performed by: NURSE PRACTITIONER

## 2024-04-25 PROCEDURE — 85025 COMPLETE CBC W/AUTO DIFF WBC: CPT

## 2024-04-25 PROCEDURE — 96413 CHEMO IV INFUSION 1 HR: CPT

## 2024-04-25 NOTE — PROGRESS NOTES
HPI   Cole Kendrick is a 58 year old male here for follow up of   Encounter Diagnoses   Name Primary?    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    Oncology history:   Extensive outside records from multiple facilities since March 2021 were reviewed.  This is a synopsis of those records, details can be found on Care Everywhere.  Patient had presented at [facility] in [facility] March 2021 due to a mass of the right shoulder.  At that time he underwent imaging with an MRI which showed a superficial right upper arm mass which appeared to be veins within the dermis.  It measured 3.9 cm x 8.3 cm x 5.9 cm it was felt that there was likely internal process of hemorrhage within the mass.  The mass demonstrated significant enhancement and it extended to the involved adjacent thickened areas of the dermis.  This mass was abutting the deltoid muscle and mildly effacing the deltoid muscle but not invading it.  There is also evidence of 13 mm short axis diameter right axillary lymph node which appeared to have a thickened cortex and was felt to be abnormal.  Dedicated ultrasound of the right axilla was recommended for further evaluation.  There were other smaller lymph nodes adjacent to the area.  Patient subsequently underwent right axillary lymph node ultrasound guided biopsy, the area contained multiple and large lymph nodes which were measuring between 3.5 and 3.2 cm.  There is at least 3 of them.  Pathology report showed lymph node tissue showing features consistent with nonspecific reactive lymphoid hyperplasia, there is no evidence of metastatic disease, nor a lymphoproliferative process or malignancy.     Patient was then referred to cancer center at [facility] in [facility], and was evaluated by her surgical oncologist Dr. Fabián Dawn.  Per review of his  consultation report, the patient had stated he had noted a new (mole) on the right arm 9 months prior to his original presentation and had enlarged over several months and began to bleed.  It was not painful and was not causing any neuromuscular issues.  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  We did discuss causes the results of the MRI above.  I was discussed for the patient to proceed with a radical resection of the right upper arm mass with placement of the wound VAC after performing the wide excision of the mass.  Surgical resection was performed on 5/26/2021 with en bloc resection of 12 cm with partial resection of the underlying right deltoid muscle.  Final wound measured 15 cm in diameter by 4 cm deep, and a wound VAC in place.  The pathology report showed that this was consistent with a malignant melanoma that measured 10.5 cm and was focally in the epidermis abutting ulcer with minute focus of possible melanoma in situ.  BRAF V600E was positive.  As the tumor staging summary, the histologic type was not determined.  The Breslow depth could not be determined.  There was ulceration present.  The mitotic rate was greater than 1/millimeter square.  Lymphovascular invasion was present.  Tumor infiltrating lymphocytes were present but nonbrisk.  There is no tumor regression noted.  The peripheral margins, and the deep margins were negative for invasive melanoma.  Tumor was 15 mm from the closest peripheral margin and 10 mm from the closest deep margin.  Plastic surgery was consulted for wound closure with skin grafting.  Patient underwent skin grafting on 6/8/2021.  Donor site was from the right thigh.     Patient was then seeking oncologic care in [facility] in [facility].  The records from that organization are not available on Care  Everywhere.  The patient states that he was receiving treatment with the local oncologist, Dr. Arce, with about 8 pills a day he was taking twice a day.  He states that he would pick them up at his doctor's office.  He states that he had had a PET scan which is showed positive lymph nodes in his right axilla.  On records available in care everywhere on 3/3/2022, there was a CT scan of the chest, abdomen and pelvis with contrast, which was compared to a PET/CT from September 16, 2021.  This states that on the prior PET/CT from September 2021 there had been enlarged right axillary lymphadenopathy that had measured 2.6 x 2.1 cm, and on the current study from March 3, 2022 it had resolved.  There was no other evidence seen of metastatic disease.  There was a 1 cm flash enhancing lesion in the right anterior lobe of the liver which was felt to be a hemangioma.     Then on July 25, 2022, it was noted that the patient had iron deficiency anemia.  At that time, on the note from the infusion center on 9/8/2022, at which time the patient was receiving Venofer infusions, the only medication listed on his med list was tramadol.  He did receive infusion of 300 mg of Venofer x6 doses as an outpatient.  Last dose was given on 10/27/2022.     On November 7, 2022, the patient received a blood transfusion for hemoglobin of 6.2, transfusion occurred on 11/8/2022.  Patient was then admitted on 12/21/2023 and discharged on 12/23/2023 to [facility], due to secondary acute anemia from blood loss secondary to GI bleed.  At that time he had presented with several weeks of dizziness, weakness, and left lower extremity swelling.  His oncologist recommended he go to the ED for evaluation.  He did a left lower extremity Doppler which was negative for DVT.  Hemoglobin at the time was 3.2.  Patient had complained of several weeks of painless red-maroon or black-colored stools which were not often.  He denied any abdominal  pain.  He denied any nausea or vomiting.  Patient received a total of 6 units of packed red cells and underwent an EGD which revealed 5 gastric ulcers with no active bleeding.  He was placed on 3 months of PPI therapy twice daily and was recommended for repeat EGD in 2 months.  At that time, the only medication listed was metformin.   Patient was then readmitted on 1/4/2023 and discharged on 1/6/2023, due to a recurrent GI bleed.  At that time, he had reported an abrupt onset of a sensation of fatigue, diaphoresis and dizziness, and then started having dark stools.  When he presented to the ED, with hypotension and hemoglobin of 8.1.  There was concern for recurrent GI bleed, however there is no evidence of overt GI bleed.  He was transfused 2 units of packed red cells, with hemoglobin up to 9.3 on day of discharge.  It was not recommended to repeat endoscopy at the time.  He was recommended to continue with the pantoprazole twice daily.  He had also had a tagged red cell scan during the first admission which was negative.       Patient presented back to his oncologist on 01/11/2023, with a hemoglobin of 6.5.  On 1/12/2023 he was then transfused 2 units of packed red cells at the outpatient infusion center at [facility].  Subsequent hemoglobin on 1/19/2023 was 7.1.  She did have repeat iron studies on 1/23/2023, with a ferritin of 12, percent iron saturation of 4%, LDH elevated to 364, with a total bilirubin of 0.4.  He again was managed with Venofer 300 mg infusions weekly x9 weeks.  With last dose administered on 5/4/2023.     Patient then had PET/CT on 2/02/2023, which at that time showed 2 thickened hypermetabolic small bowel loops with SUV max of 11.07 and 13.72.  They have felt that these findings were suspicious for an intussusception and a CT scan of the abdomen pelvis was recommended with and without contrast.  They still noted that there was no right axillary lymphadenopathy.     Patient was then  readmitted on 5/17/2023 through 5/18/2023, again due to acute on chronic blood loss anemia and received 2 units of packed red cells.  It was suspected to be an upper GI bleed and was referred for outpatient capsule endoscopy.  At the time of presentation on 5/17/2023 he had offered a 4-day history of black stools and dizziness in the morning.  When he presented to the ED his hemoglobin was 5.7.  After transfusion of 2 units of packed red cells his hemoglobin was up to 7.1.  Last hemoglobin on file as outpatient on 5/25/2023 was 8.4 with ferritin of 11.     Presented to [facility] ER on 6/28/23 with c/o weakness, epigastric pain and melanotic stools.  Hgb 4.6.  PMH c/w DM, gastric ulcers with h/o GI bleed s/p IV iron.  He underwent an EGD which did not show any evidence of upper GI bleeding. There were 2 small superficial ulcers/erosions in the duodenal bulb which were not felt to be the cause of the profound anemia with hemoglobin of less than 5 on admission. Patient was recommended to have a CT enterography due to PET/CT on 2/2/2023 with 2 thickened hypermetabolic masses and small bowel which were felt to be secondary to intussusception. Capsule endoscopy was to be considered pending results. CT enterography was performed on 6/29/2023, which showed an 8.7 cm area of masslike thickening involving a loop of small bowel within the right lower quadrant. There was an additional 7 cm area of masslike thickening involving a loop of small bowel within the left lower quadrant. These findings were felt to be suspicious of metastatic disease to small bowel given best history of melanoma.     S/[facility] - two separate foci of metastatic melanoma, 7.5 cm and 6.5 cm, BRAF mutant.      S/p cycle 4 Ipilimumab with 3 mg/Nivolumab 1 mg.  Tolerated very well.    Currently s/p cycle 15 single agent nivolumab.     Fatigue:  No    Fevers:  No    Appetite/taste changes:   No    Mucositis:  No    Weight changes:  No    Nausea/vomiting:  No    Diarrhea:  No    Constipation:  No    Peripheral neuropathy:  No       Lives in [place], [place].    ECOG PS 0    Review of Systems:   Review of Systems   Eyes:  Negative for eye problems.   Respiratory:  Negative for cough and shortness of breath.    Cardiovascular:  Negative for chest pain, leg swelling and palpitations.   Gastrointestinal:  Positive for constipation (mild). Negative for abdominal pain and blood in stool.   Genitourinary:  Negative for difficulty urinating, dysuria, frequency and hematuria.    Musculoskeletal:  Negative for arthralgias (on and off at different joints) and back pain.   Skin:  Negative for itching and rash.   Neurological:  Negative for dizziness, extremity weakness and headaches.   Hematological:  Negative for adenopathy. Does not bruise/bleed easily.   Psychiatric/Behavioral:  Negative for sleep disturbance.          No current outpatient medications on file.     Allergies:   No Known Allergies    Past Medical History:    Anemia    with multiple blood transfusion    GIB (gastrointestinal bleeding)    Melanoma (HCC)    right shoulder s/p surgical excision and skin graft, adjuvant BRAF/MEK inhibitors for 1.5 yrs    Personal history of antineoplastic chemotherapy     Past Surgical History:   Procedure Laterality Date    Other surgical history      small intestine surgery    Upper gi endoscopy,exam  12/01/2022     Social History     Socioeconomic History    Marital status: Single   Tobacco Use    Smoking status: Never    Smokeless tobacco: Never   Vaping Use    Vaping status: Never Used   Substance and Sexual Activity    Alcohol use: Not Currently    Drug use: Never       No family history on file.      PHYSICAL EXAM:    /86 (BP Location: Right arm, Patient Position: Sitting, Cuff Size: adult)   Pulse 59   Temp 98 °F (36.7 °C) (Oral)   Resp 16   Ht 1.702 m (5' 7\")   Wt 105.8 kg (233 lb 3.2 oz)   SpO2 100%    BMI 36.52 kg/m²   Wt Readings from Last 6 Encounters:   04/11/24 105.7 kg (233 lb)   03/27/24 104.8 kg (231 lb)   03/13/24 105.8 kg (233 lb 3.2 oz)   02/28/24 104.2 kg (229 lb 11.2 oz)   02/14/24 103.3 kg (227 lb 12.8 oz)   01/31/24 102.3 kg (225 lb 8 oz)     Physical Exam  General: Patient is alert, not in acute distress.    HEENT: EOMs intact. PERRL, Anicteric.  MMM.   Neck: No JVD. No palpable lymphadenopathy. Neck is supple, surgical scar.  Chest: Clear to auscultation.  Heart: Regular rate and rhythm.   Abdomen: Soft, non tender with good bowel sounds.  Surgical scars.  Extremities: No edema.  Neurological: Grossly intact.   Psych/Depression: nl        ASSESSMENT/PLAN:     1. Malignant melanoma of right upper extremity including shoulder (HCC)    2. 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 Encounter for antineoplastic immunotherapy        Malignant melanoma of right upper extremity including shoulder (HCC)  Proceed with treatment with nivolumab and ipilimumab x 4 cycles followed by maintenance nivolumab for total of 18 to 24 months.  Discussed goal of treatment is still potentially curative with these agents.      S/p cycle 4 Ipilimumab 3 mg/Nivolumab 1 mg.      Tumor assessment after the fourth cycle of treatment with marked response to therapy.  Tumor assessment after cycle 10 with very minimal activity in the LN in the abdomen.    S/p cycle 15 maintenance nivolumab 3 mg/kg every 2 weeks.   PET/CT done today.  I personally reviewed the images and compared to last PET/CT 3 months ago.  Still [place].  Official report pending.    Proceed with cycle 16.  Restaging PET/CT due in July of 2024.    Goal is to complete a total of 18 months to 24 months of treatment from initiation of therapy.  24 months will be in August of 2025.    No orders of the defined types were placed in this encounter.    Call prn.  Follow-up in 2 weeks    MDM: high, malignancy, life threatening. Drug therapy requiring intensive  monitoring for tox    Results From Past 48 Hours:  Recent Results (from the past 48 hour(s))   Comp Metabolic Panel (14)    Collection Time: 04/25/24  7:16 AM   Result Value Ref Range    Glucose 184 (H) 70 - 99 mg/dL    Sodium 138 136 - 145 mmol/L    Potassium 4.4 3.5 - 5.1 mmol/L    Chloride 105 98 - 112 mmol/L    CO2 26.0 21.0 - 32.0 mmol/L    Anion Gap 7 0 - 18 mmol/L    BUN 16 9 - 23 mg/dL    Creatinine 0.89 0.70 - 1.30 mg/dL    BUN/CREA Ratio 18.0 10.0 - 20.0    Calcium, Total 9.6 8.7 - 10.4 mg/dL    Calculated Osmolality 292 275 - 295 mOsm/kg    eGFR-Cr 99 >=60 mL/min/1.73m2    ALT 20 10 - 49 U/L    AST 18 <=34 U/L    Alkaline Phosphatase 85 45 - 117 U/L    Bilirubin, Total 0.8 0.3 - 1.2 mg/dL    Total Protein 7.3 5.7 - 8.2 g/dL    Albumin 4.5 3.2 - 4.8 g/dL    Globulin  2.8 2.8 - 4.4 g/dL    A/G Ratio 1.6 1.0 - 2.0    Patient Fasting for CMP? No    CBC W/ DIFFERENTIAL    Collection Time: 04/25/24  7:16 AM   Result Value Ref Range    WBC 5.7 4.0 - 11.0 x10(3) uL    RBC 4.85 4.30 - 5.70 x10(6)uL    HGB 15.9 13.0 - 17.5 g/dL    HCT 44.8 39.0 - 53.0 %    MCV 92.4 80.0 - 100.0 fL    MCH 32.8 26.0 - 34.0 pg    MCHC 35.5 31.0 - 37.0 g/dL    RDW-SD 42.8 35.1 - 46.3 fL    RDW 12.5 11.0 - 15.0 %    .0 150.0 - 450.0 10(3)uL    Neutrophil Absolute Prelim 2.85 1.50 - 7.70 x10 (3) uL    Neutrophil Absolute 2.85 1.50 - 7.70 x10(3) uL    Lymphocyte Absolute 2.11 1.00 - 4.00 x10(3) uL    Monocyte Absolute 0.57 0.10 - 1.00 x10(3) uL    Eosinophil Absolute 0.07 0.00 - 0.70 x10(3) uL    Basophil Absolute 0.07 0.00 - 0.20 x10(3) uL    Immature Granulocyte Absolute 0.02 0.00 - 1.00 x10(3) uL    Neutrophil % 50.1 %    Lymphocyte % 37.1 %    Monocyte % 10.0 %    Eosinophil % 1.2 %    Basophil % 1.2 %    Immature Granulocyte % 0.4 %

## 2024-04-25 NOTE — PROGRESS NOTES
Pt here for C17D1 Drug(s)OPDIVO.  Arrives Ambulating independently, accompanied by Self     Patient was evaluated today by MD.    Oral medications included in this regimen:  no    Patient confirms comprehension of cancer treatment schedule:  yes    Pregnancy screening:  Not applicable    Modifications in dose or schedule:  No    Medications appearance and physical integrity checked by RN: yes.    Chemotherapy IV pump settings verified by 2 RNs:  N/A.  Frequency of blood return and site check throughout administration: Prior to administration and At completion of therapy     Infusion/treatment outcome:  patient tolerated treatment without incident    Education Record    Learner:  Patient  Barriers / Limitations:  None  Method:  Discussion  Education / instructions given:  POC  Outcome:  Shows understanding    Discharged Home, Ambulating independently, accompanied by:Self    Patient/family verbalized understanding of future appointments: by printed AVS

## 2024-05-09 ENCOUNTER — NURSE ONLY (OUTPATIENT)
Dept: HEMATOLOGY/ONCOLOGY | Facility: HOSPITAL | Age: 59
End: 2024-05-09
Attending: INTERNAL MEDICINE
Payer: MEDICAID

## 2024-05-09 ENCOUNTER — OFFICE VISIT (OUTPATIENT)
Dept: HEMATOLOGY/ONCOLOGY | Facility: HOSPITAL | Age: 59
End: 2024-05-09
Attending: NURSE PRACTITIONER
Payer: MEDICAID

## 2024-05-09 VITALS
DIASTOLIC BLOOD PRESSURE: 86 MMHG | RESPIRATION RATE: 16 BRPM | HEIGHT: 67 IN | OXYGEN SATURATION: 98 % | HEART RATE: 61 BPM | BODY MASS INDEX: 36.73 KG/M2 | TEMPERATURE: 98 F | SYSTOLIC BLOOD PRESSURE: 147 MMHG | WEIGHT: 234 LBS

## 2024-05-09 DIAGNOSIS — C43.61 MALIGNANT MELANOMA OF RIGHT UPPER EXTREMITY INCLUDING SHOULDER (HCC): Primary | ICD-10-CM

## 2024-05-09 DIAGNOSIS — Z51.12 ENCOUNTER FOR ANTINEOPLASTIC IMMUNOTHERAPY: ICD-10-CM

## 2024-05-09 DIAGNOSIS — C78.89 MELANOMA METASTATIC TO DIGESTIVE ORGAN (HCC): ICD-10-CM

## 2024-05-09 LAB
ALBUMIN SERPL-MCNC: 4.6 G/DL (ref 3.2–4.8)
ALBUMIN/GLOB SERPL: 1.6 {RATIO} (ref 1–2)
ALP LIVER SERPL-CCNC: 89 U/L
ALT SERPL-CCNC: 20 U/L
ANION GAP SERPL CALC-SCNC: 7 MMOL/L (ref 0–18)
AST SERPL-CCNC: 17 U/L (ref ?–34)
BASOPHILS # BLD AUTO: 0.08 X10(3) UL (ref 0–0.2)
BASOPHILS NFR BLD AUTO: 1.3 %
BILIRUB SERPL-MCNC: 0.5 MG/DL (ref 0.3–1.2)
BUN BLD-MCNC: 19 MG/DL (ref 9–23)
BUN/CREAT SERPL: 22.6 (ref 10–20)
CALCIUM BLD-MCNC: 9 MG/DL (ref 8.7–10.4)
CHLORIDE SERPL-SCNC: 107 MMOL/L (ref 98–112)
CO2 SERPL-SCNC: 25 MMOL/L (ref 21–32)
CREAT BLD-MCNC: 0.84 MG/DL
DEPRECATED RDW RBC AUTO: 42.5 FL (ref 35.1–46.3)
EGFRCR SERPLBLD CKD-EPI 2021: 101 ML/MIN/1.73M2 (ref 60–?)
EOSINOPHIL # BLD AUTO: 0.06 X10(3) UL (ref 0–0.7)
EOSINOPHIL NFR BLD AUTO: 1 %
ERYTHROCYTE [DISTWIDTH] IN BLOOD BY AUTOMATED COUNT: 12.6 % (ref 11–15)
GLOBULIN PLAS-MCNC: 2.8 G/DL (ref 2–3.5)
GLUCOSE BLD-MCNC: 221 MG/DL (ref 70–99)
HCT VFR BLD AUTO: 43.1 %
HGB BLD-MCNC: 15.6 G/DL
IMM GRANULOCYTES # BLD AUTO: 0.02 X10(3) UL (ref 0–1)
IMM GRANULOCYTES NFR BLD: 0.3 %
LYMPHOCYTES # BLD AUTO: 2.25 X10(3) UL (ref 1–4)
LYMPHOCYTES NFR BLD AUTO: 36.8 %
MCH RBC QN AUTO: 33.3 PG (ref 26–34)
MCHC RBC AUTO-ENTMCNC: 36.2 G/DL (ref 31–37)
MCV RBC AUTO: 92.1 FL
MONOCYTES # BLD AUTO: 0.57 X10(3) UL (ref 0.1–1)
MONOCYTES NFR BLD AUTO: 9.3 %
NEUTROPHILS # BLD AUTO: 3.14 X10 (3) UL (ref 1.5–7.7)
NEUTROPHILS # BLD AUTO: 3.14 X10(3) UL (ref 1.5–7.7)
NEUTROPHILS NFR BLD AUTO: 51.3 %
OSMOLALITY SERPL CALC.SUM OF ELEC: 297 MOSM/KG (ref 275–295)
PLATELET # BLD AUTO: 232 10(3)UL (ref 150–450)
POTASSIUM SERPL-SCNC: 4.4 MMOL/L (ref 3.5–5.1)
PROT SERPL-MCNC: 7.4 G/DL (ref 5.7–8.2)
RBC # BLD AUTO: 4.68 X10(6)UL
SODIUM SERPL-SCNC: 139 MMOL/L (ref 136–145)
WBC # BLD AUTO: 6.1 X10(3) UL (ref 4–11)

## 2024-05-09 PROCEDURE — 80053 COMPREHEN METABOLIC PANEL: CPT

## 2024-05-09 PROCEDURE — 85025 COMPLETE CBC W/AUTO DIFF WBC: CPT

## 2024-05-09 PROCEDURE — 96413 CHEMO IV INFUSION 1 HR: CPT

## 2024-05-09 PROCEDURE — 99215 OFFICE O/P EST HI 40 MIN: CPT | Performed by: NURSE PRACTITIONER

## 2024-05-09 NOTE — PROGRESS NOTES
HPI   Cole Kendrick is a 58 year old male here for follow up of   Encounter Diagnoses   Name Primary?    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    Oncology history:   Extensive outside records from multiple facilities since March 2021 were reviewed.  This is a synopsis of those records, details can be found on Care Everywhere.  Patient had presented at [facility] in [place] March 2021 due to a mass of the right shoulder.  At that time he underwent imaging with an MRI which showed a superficial right upper arm mass which appeared to be veins within the dermis.  It measured 3.9 cm x 8.3 cm x 5.9 cm it was felt that there was likely internal process of hemorrhage within the mass.  The mass demonstrated significant enhancement and it extended to the involved adjacent thickened areas of the dermis.  This mass was abutting the deltoid muscle and mildly effacing the deltoid muscle but not invading it.  There is also evidence of 13 mm short axis diameter right axillary lymph node which appeared to have a thickened cortex and was felt to be abnormal.  Dedicated ultrasound of the right axilla was recommended for further evaluation.  There were other smaller lymph nodes adjacent to the area.  Patient subsequently underwent right axillary lymph node ultrasound guided biopsy, the area contained multiple and large lymph nodes which were measuring between 3.5 and 3.2 cm.  There is at least 3 of them.  Pathology report showed lymph node tissue showing features consistent with nonspecific reactive lymphoid hyperplasia, there is no evidence of metastatic disease, nor a lymphoproliferative process or malignancy.     Patient was then referred to cancer center at [facility] in [facility], and was evaluated by her surgical oncologist Dr. Fabián Dawn.  Per review of his  consultation report, the patient had stated he had noted a new (mole) on the right arm 9 months prior to his original presentation and had enlarged over several months and began to bleed.  It was not painful and was not causing any neuromuscular issues.  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  We did discuss causes the results of the MRI above.  I was discussed for the patient to proceed with a radical resection of the right upper arm mass with placement of the wound VAC after performing the wide excision of the mass.  Surgical resection was performed on 5/26/2021 with en bloc resection of 12 cm with partial resection of the underlying right deltoid muscle.  Final wound measured 15 cm in diameter by 4 cm deep, and a wound VAC in place.  The pathology report showed that this was consistent with a malignant melanoma that measured 10.5 cm and was focally in the epidermis abutting ulcer with minute focus of possible melanoma in situ.  BRAF V600E was positive.  As the tumor staging summary, the histologic type was not determined.  The Breslow depth could not be determined.  There was ulceration present.  The mitotic rate was greater than 1/millimeter square.  Lymphovascular invasion was present.  Tumor infiltrating lymphocytes were present but nonbrisk.  There is no tumor regression noted.  The peripheral margins, and the deep margins were negative for invasive melanoma.  Tumor was 15 mm from the closest peripheral margin and 10 mm from the closest deep margin.  Plastic surgery was consulted for wound closure with skin grafting.  Patient underwent skin grafting on 6/8/2021.  Donor site was from the right thigh.     Patient was then seeking oncologic care in [facility] in [facility].  The records from that organization are not available on Care  Everywhere.  The patient states that he was receiving treatment with the local oncologist, Dr. Arce, with about 8 pills a day he was taking twice a day.  He states that he would pick them up at his doctor's office.  He states that he had had a PET scan which is showed positive lymph nodes in his right axilla.  On records available in care everywhere on 3/3/2022, there was a CT scan of the chest, abdomen and pelvis with contrast, which was compared to a PET/CT from September 16, 2021.  This states that on the prior PET/CT from September 2021 there had been enlarged right axillary lymphadenopathy that had measured 2.6 x 2.1 cm, and on the current study from March 3, 2022 it had resolved.  There was no other evidence seen of metastatic disease.  There was a 1 cm flash enhancing lesion in the right anterior lobe of the liver which was felt to be a hemangioma.     Then on July 25, 2022, it was noted that the patient had iron deficiency anemia.  At that time, on the note from the infusion center on 9/8/2022, at which time the patient was receiving Venofer infusions, the only medication listed on his med list was tramadol.  He did receive infusion of 300 mg of Venofer x6 doses as an outpatient.  Last dose was given on 10/27/2022.     On November 7, 2022, the patient received a blood transfusion for hemoglobin of 6.2, transfusion occurred on 11/8/2022.  Patient was then admitted on 12/21/2023 and discharged on 12/23/2023 to [facility], due to secondary acute anemia from blood loss secondary to GI bleed.  At that time he had presented with several weeks of dizziness, weakness, and left lower extremity swelling.  His oncologist recommended he go to the ED for evaluation.  He did a left lower extremity Doppler which was negative for DVT.  Hemoglobin at the time was 3.2.  Patient had complained of several weeks of painless red-maroon or black-colored stools which were not often.  He denied any abdominal  pain.  He denied any nausea or vomiting.  Patient received a total of 6 units of packed red cells and underwent an EGD which revealed 5 gastric ulcers with no active bleeding.  He was placed on 3 months of PPI therapy twice daily and was recommended for repeat EGD in 2 months.  At that time, the only medication listed was metformin.   Patient was then readmitted on 1/4/2023 and discharged on 1/6/2023, due to a recurrent GI bleed.  At that time, he had reported an abrupt onset of a sensation of fatigue, diaphoresis and dizziness, and then started having dark stools.  When he presented to the ED, with hypotension and hemoglobin of 8.1.  There was concern for recurrent GI bleed, however there is no evidence of overt GI bleed.  He was transfused 2 units of packed red cells, with hemoglobin up to 9.3 on day of discharge.  It was not recommended to repeat endoscopy at the time.  He was recommended to continue with the pantoprazole twice daily.  He had also had a tagged red cell scan during the first admission which was negative.       Patient presented back to his oncologist on 01/11/2023, with a hemoglobin of 6.5.  On 1/12/2023 he was then transfused 2 units of packed red cells at the outpatient infusion center at [facility].  Subsequent hemoglobin on 1/19/2023 was 7.1.  She did have repeat iron studies on 1/23/2023, with a ferritin of 12, percent iron saturation of 4%, LDH elevated to 364, with a total bilirubin of 0.4.  He again was managed with Venofer 300 mg infusions weekly x9 weeks.  With last dose administered on 5/4/2023.     Patient then had PET/CT on 2/02/2023, which at that time showed 2 thickened hypermetabolic small bowel loops with SUV max of 11.07 and 13.72.  They have felt that these findings were suspicious for an intussusception and a CT scan of the abdomen pelvis was recommended with and without contrast.  They still noted that there was no right axillary lymphadenopathy.     Patient was then  readmitted on 5/17/2023 through 5/18/2023, again due to acute on chronic blood loss anemia and received 2 units of packed red cells.  It was suspected to be an upper GI bleed and was referred for outpatient capsule endoscopy.  At the time of presentation on 5/17/2023 he had offered a 4-day history of black stools and dizziness in the morning.  When he presented to the ED his hemoglobin was 5.7.  After transfusion of 2 units of packed red cells his hemoglobin was up to 7.1.  Last hemoglobin on file as outpatient on 5/25/2023 was 8.4 with ferritin of 11.     Presented to [facility] ER on 6/28/23 with c/o weakness, epigastric pain and melanotic stools.  Hgb 4.6.  PMH c/w DM, gastric ulcers with h/o GI bleed s/p IV iron.  He underwent an EGD which did not show any evidence of upper GI bleeding. There were 2 small superficial ulcers/erosions in the duodenal bulb which were not felt to be the cause of the profound anemia with hemoglobin of less than 5 on admission. Patient was recommended to have a CT enterography due to PET/CT on 2/2/2023 with 2 thickened hypermetabolic masses and small bowel which were felt to be secondary to intussusception. Capsule endoscopy was to be considered pending results. CT enterography was performed on 6/29/2023, which showed an 8.7 cm area of masslike thickening involving a loop of small bowel within the right lower quadrant. There was an additional 7 cm area of masslike thickening involving a loop of small bowel within the left lower quadrant. These findings were felt to be suspicious of metastatic disease to small bowel given best history of melanoma.     S/p en bloc small bowel resection and partial omentectomy ([facility]) on 6/30/23 - two separate foci of metastatic melanoma, 7.5 cm and 6.5 cm, BRAF mutant.      S/p cycle 4 Ipilimumab with 3 mg/Nivolumab 1 mg.  Tolerated very well.    Currently s/p cycle 18 single agent nivolumab. Tolerating well.     Fatigue:  No    Fevers:   No    Appetite/taste changes:  No    Mucositis:  No    Weight changes:  No    Nausea/vomiting:  No    Diarrhea:  No    Constipation:  No    Peripheral neuropathy:  No       Lives in [place], [place].    ECOG PS 0    Review of Systems:   Review of Systems   Eyes:  Negative for eye problems.   Respiratory:  Negative for cough and shortness of breath.    Cardiovascular:  Negative for chest pain, leg swelling and palpitations.   Gastrointestinal:  Positive for constipation (mild). Negative for abdominal pain and blood in stool.   Genitourinary:  Negative for difficulty urinating, dysuria, frequency and hematuria.    Musculoskeletal:  Negative for arthralgias (on and off at different joints) and back pain.   Skin:  Negative for itching and rash.   Neurological:  Negative for dizziness, extremity weakness and headaches.   Hematological:  Negative for adenopathy. Does not bruise/bleed easily.   Psychiatric/Behavioral:  Negative for sleep disturbance.          No current outpatient medications on file.     Allergies:   No Known Allergies    Past Medical History:    Anemia    with multiple blood transfusion    GIB (gastrointestinal bleeding)    Melanoma (HCC)    right shoulder s/p surgical excision and skin graft, adjuvant BRAF/MEK inhibitors for 1.5 yrs    Personal history of antineoplastic chemotherapy     Past Surgical History:   Procedure Laterality Date    Other surgical history      small intestine surgery    Upper gi endoscopy,exam  12/01/2022     Social History     Socioeconomic History    Marital status: Single   Tobacco Use    Smoking status: Never    Smokeless tobacco: Never   Vaping Use    Vaping status: Never Used   Substance and Sexual Activity    Alcohol use: Not Currently    Drug use: Never       No family history on file.      PHYSICAL EXAM:    /86 (BP Location: Left arm, Patient Position: Sitting, Cuff Size: large)   Pulse 61   Temp 97.9 °F (36.6 °C) (Oral)   Resp 16   Ht 1.702 m (5' 7\")   Wt 106.1 kg  (234 lb)   SpO2 98%   BMI 36.65 kg/m²   Wt Readings from Last 6 Encounters:   05/09/24 106.1 kg (234 lb)   04/25/24 105.8 kg (233 lb 3.2 oz)   04/11/24 105.7 kg (233 lb)   03/27/24 104.8 kg (231 lb)   03/13/24 105.8 kg (233 lb 3.2 oz)   02/28/24 104.2 kg (229 lb 11.2 oz)     Physical Exam  General: Patient is alert, not in acute distress.    HEENT: EOMs intact. PERRL, Anicteric.  MMM.   Neck: No JVD. No palpable lymphadenopathy. Neck is supple, surgical scar.  Chest: Clear to auscultation.  Heart: Regular rate and rhythm.   Abdomen: Soft, non tender with good bowel sounds.  Surgical scars.  Extremities: No edema.  Neurological: Grossly intact.   Psych/Depression: nl        ASSESSMENT/PLAN:     1. Malignant melanoma of right upper extremity including shoulder (HCC)    2. 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 Encounter for antineoplastic immunotherapy        Malignant melanoma of right upper extremity including shoulder (HCC)  Proceed with treatment with nivolumab and ipilimumab x 4 cycles followed by maintenance nivolumab for total of 18 to 24 months.  Discussed goal of treatment is still potentially curative with these agents.      S/p cycle 4 Ipilimumab 3 mg/Nivolumab 1 mg.      Tumor assessment after the fourth cycle of treatment with marked response to therapy.  Tumor assessment after cycle 10 with very minimal activity in the LN in the abdomen.    S/p cycle 17 maintenance nivolumab 3 mg/kg every 2 weeks.   PET/CT done today.  I personally reviewed the images and compared to last PET/CT 3 months ago.  Still [place].  Official report pending.    Proceed with cycle 18.  Restaging PET/CT due in July of 2024.    Goal is to complete a total of 18 months to 24 months of treatment from initiation of therapy.  24 months will be in August of 2025.    No orders of the defined types were placed in this encounter.    Call prn.  Follow-up in 2 weeks    MDM: high, malignancy, life threatening. Drug therapy requiring  intensive monitoring for tox    Results From Past 48 Hours:  Recent Results (from the past 48 hour(s))   Comp Metabolic Panel (14)    Collection Time: 05/09/24  7:41 AM   Result Value Ref Range    Glucose 221 (H) 70 - 99 mg/dL    Sodium 139 136 - 145 mmol/L    Potassium 4.4 3.5 - 5.1 mmol/L    Chloride 107 98 - 112 mmol/L    CO2 25.0 21.0 - 32.0 mmol/L    Anion Gap 7 0 - 18 mmol/L    BUN 19 9 - 23 mg/dL    Creatinine 0.84 0.70 - 1.30 mg/dL    BUN/CREA Ratio 22.6 (H) 10.0 - 20.0    Calcium, Total 9.0 8.7 - 10.4 mg/dL    Calculated Osmolality 297 (H) 275 - 295 mOsm/kg    eGFR-Cr 101 >=60 mL/min/1.73m2    ALT 20 10 - 49 U/L    AST 17 <=34 U/L    Alkaline Phosphatase 89 45 - 117 U/L    Bilirubin, Total 0.5 0.3 - 1.2 mg/dL    Total Protein 7.4 5.7 - 8.2 g/dL    Albumin 4.6 3.2 - 4.8 g/dL    Globulin  2.8 2.0 - 3.5 g/dL    A/G Ratio 1.6 1.0 - 2.0    Patient Fasting for CMP? Patient not present    CBC W/ DIFFERENTIAL    Collection Time: 05/09/24  7:41 AM   Result Value Ref Range    WBC 6.1 4.0 - 11.0 x10(3) uL    RBC 4.68 4.30 - 5.70 x10(6)uL    HGB 15.6 13.0 - 17.5 g/dL    HCT 43.1 39.0 - 53.0 %    MCV 92.1 80.0 - 100.0 fL    MCH 33.3 26.0 - 34.0 pg    MCHC 36.2 31.0 - 37.0 g/dL    RDW-SD 42.5 35.1 - 46.3 fL    RDW 12.6 11.0 - 15.0 %    .0 150.0 - 450.0 10(3)uL    Neutrophil Absolute Prelim 3.14 1.50 - 7.70 x10 (3) uL    Neutrophil Absolute 3.14 1.50 - 7.70 x10(3) uL    Lymphocyte Absolute 2.25 1.00 - 4.00 x10(3) uL    Monocyte Absolute 0.57 0.10 - 1.00 x10(3) uL    Eosinophil Absolute 0.06 0.00 - 0.70 x10(3) uL    Basophil Absolute 0.08 0.00 - 0.20 x10(3) uL    Immature Granulocyte Absolute 0.02 0.00 - 1.00 x10(3) uL    Neutrophil % 51.3 %    Lymphocyte % 36.8 %    Monocyte % 9.3 %    Eosinophil % 1.0 %    Basophil % 1.3 %    Immature Granulocyte % 0.3 %

## 2024-05-09 NOTE — PROGRESS NOTES
Pt here for C18D1 Drug(s)Opdivo.  Arrives Ambulating independently, accompanied by Self     Patient was evaluated today by EMIR.  Oral medications included in this regimen:  no  Patient confirms comprehension of cancer treatment schedule:  yes  Pregnancy screening:  Not applicable  Modifications in dose or schedule:  No  Medications appearance and physical integrity checked by RN: yes.  Chemotherapy IV pump settings verified by 2 RNs:  No due to targeted therapy IV administration.  Frequency of blood return and site check throughout administration: Prior to administration, Prior to each drug, and At completion of therapy   Infusion/treatment outcome:  patient tolerated treatment without incident    Education Record    Learner:  Patient  Barriers / Limitations:  None  Method:  Reinforcement  Education / instructions given:  Plan of care.  Outcome:  Shows understanding    Discharged Home, Ambulating independently, accompanied by:Self    Patient/family verbalized understanding of future appointments: by printed AVS

## 2024-05-23 ENCOUNTER — OFFICE VISIT (OUTPATIENT)
Dept: HEMATOLOGY/ONCOLOGY | Facility: HOSPITAL | Age: 59
End: 2024-05-23
Attending: INTERNAL MEDICINE

## 2024-05-23 ENCOUNTER — SOCIAL WORK SERVICES (OUTPATIENT)
Dept: HEMATOLOGY/ONCOLOGY | Facility: HOSPITAL | Age: 59
End: 2024-05-23

## 2024-05-23 VITALS
SYSTOLIC BLOOD PRESSURE: 148 MMHG | HEART RATE: 55 BPM | RESPIRATION RATE: 16 BRPM | BODY MASS INDEX: 36.94 KG/M2 | HEIGHT: 67 IN | OXYGEN SATURATION: 98 % | DIASTOLIC BLOOD PRESSURE: 82 MMHG | WEIGHT: 235.38 LBS | TEMPERATURE: 98 F

## 2024-05-23 DIAGNOSIS — C43.61 MALIGNANT MELANOMA OF RIGHT UPPER EXTREMITY INCLUDING SHOULDER (HCC): Primary | ICD-10-CM

## 2024-05-23 DIAGNOSIS — Z51.12 ENCOUNTER FOR ANTINEOPLASTIC IMMUNOTHERAPY: ICD-10-CM

## 2024-05-23 DIAGNOSIS — C78.89 MELANOMA METASTATIC TO DIGESTIVE ORGAN (HCC): ICD-10-CM

## 2024-05-23 DIAGNOSIS — C43.9 MALIGNANT MELANOMA, UNSPECIFIED SITE (HCC): Primary | ICD-10-CM

## 2024-05-23 LAB
ALBUMIN SERPL-MCNC: 4.4 G/DL (ref 3.2–4.8)
ALBUMIN/GLOB SERPL: 1.5 {RATIO} (ref 1–2)
ALP LIVER SERPL-CCNC: 83 U/L
ALT SERPL-CCNC: 23 U/L
ANION GAP SERPL CALC-SCNC: 6 MMOL/L (ref 0–18)
AST SERPL-CCNC: 18 U/L (ref ?–34)
BASOPHILS # BLD AUTO: 0.08 X10(3) UL (ref 0–0.2)
BASOPHILS NFR BLD AUTO: 1.6 %
BILIRUB SERPL-MCNC: 0.7 MG/DL (ref 0.3–1.2)
BUN BLD-MCNC: 11 MG/DL (ref 9–23)
BUN/CREAT SERPL: 13.3 (ref 10–20)
CALCIUM BLD-MCNC: 9.4 MG/DL (ref 8.7–10.4)
CHLORIDE SERPL-SCNC: 107 MMOL/L (ref 98–112)
CO2 SERPL-SCNC: 25 MMOL/L (ref 21–32)
CREAT BLD-MCNC: 0.83 MG/DL
DEPRECATED RDW RBC AUTO: 42.9 FL (ref 35.1–46.3)
EGFRCR SERPLBLD CKD-EPI 2021: 101 ML/MIN/1.73M2 (ref 60–?)
EOSINOPHIL # BLD AUTO: 0.08 X10(3) UL (ref 0–0.7)
EOSINOPHIL NFR BLD AUTO: 1.6 %
ERYTHROCYTE [DISTWIDTH] IN BLOOD BY AUTOMATED COUNT: 12.6 % (ref 11–15)
GLOBULIN PLAS-MCNC: 2.9 G/DL (ref 2–3.5)
GLUCOSE BLD-MCNC: 214 MG/DL (ref 70–99)
HCT VFR BLD AUTO: 43.6 %
HGB BLD-MCNC: 15.7 G/DL
IMM GRANULOCYTES # BLD AUTO: 0.01 X10(3) UL (ref 0–1)
IMM GRANULOCYTES NFR BLD: 0.2 %
LYMPHOCYTES # BLD AUTO: 2.18 X10(3) UL (ref 1–4)
LYMPHOCYTES NFR BLD AUTO: 42.7 %
MCH RBC QN AUTO: 33.3 PG (ref 26–34)
MCHC RBC AUTO-ENTMCNC: 36 G/DL (ref 31–37)
MCV RBC AUTO: 92.6 FL
MONOCYTES # BLD AUTO: 0.57 X10(3) UL (ref 0.1–1)
MONOCYTES NFR BLD AUTO: 11.2 %
NEUTROPHILS # BLD AUTO: 2.19 X10 (3) UL (ref 1.5–7.7)
NEUTROPHILS # BLD AUTO: 2.19 X10(3) UL (ref 1.5–7.7)
NEUTROPHILS NFR BLD AUTO: 42.7 %
OSMOLALITY SERPL CALC.SUM OF ELEC: 292 MOSM/KG (ref 275–295)
PLATELET # BLD AUTO: 215 10(3)UL (ref 150–450)
POTASSIUM SERPL-SCNC: 4.2 MMOL/L (ref 3.5–5.1)
PROT SERPL-MCNC: 7.3 G/DL (ref 5.7–8.2)
RBC # BLD AUTO: 4.71 X10(6)UL
SODIUM SERPL-SCNC: 138 MMOL/L (ref 136–145)
T4 FREE SERPL-MCNC: 1.2 NG/DL (ref 0.8–1.7)
TSI SER-ACNC: 3.08 MIU/ML (ref 0.55–4.78)
WBC # BLD AUTO: 5.1 X10(3) UL (ref 4–11)

## 2024-05-23 PROCEDURE — 80053 COMPREHEN METABOLIC PANEL: CPT

## 2024-05-23 PROCEDURE — 96413 CHEMO IV INFUSION 1 HR: CPT

## 2024-05-23 PROCEDURE — 99215 OFFICE O/P EST HI 40 MIN: CPT | Performed by: INTERNAL MEDICINE

## 2024-05-23 PROCEDURE — 85025 COMPLETE CBC W/AUTO DIFF WBC: CPT

## 2024-05-23 PROCEDURE — 84443 ASSAY THYROID STIM HORMONE: CPT

## 2024-05-23 PROCEDURE — 84439 ASSAY OF FREE THYROXINE: CPT

## 2024-05-23 NOTE — PROGRESS NOTES
SW met with patient in infusion 19. Patient is [place] speaking only. SW used the  services to assist with translating. SW introduced self and offer to assist patient. Patient had question regarding a hospital bill he received. Patient is enrolled in [facility] Medicaid.SIOBHAN contacted [employer] Billing Office to get an explanation of benefits. Patient's bill was processed by [employer] and and there was a remaining balance that was not paid . SW explained the bill to patient in detail. Patient was prompted to contact Medicaid  at [phone] to find out why a portion of bill was unpaid. SIOBHAN provided a patient with an application for [facility] Financial Assistance in [place].SW prompted patient to fill out the application and provide supporting documentation when ready to submit it. SIOBHAN will wait to receive back.

## 2024-05-23 NOTE — PROGRESS NOTES
HPI   Cole Kendrick is a 58 year old male here for follow up of   Encounter Diagnoses   Name Primary?    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    Oncology history:   Extensive outside records from multiple facilities since March 2021 were reviewed.  This is a synopsis of those records, details can be found on Care Everywhere.  Patient had presented at [facility] in [facility] March 2021 due to a mass of the right shoulder.  At that time he underwent imaging with an MRI which showed a superficial right upper arm mass which appeared to be veins within the dermis.  It measured 3.9 cm x 8.3 cm x 5.9 cm it was felt that there was likely internal process of hemorrhage within the mass.  The mass demonstrated significant enhancement and it extended to the involved adjacent thickened areas of the dermis.  This mass was abutting the deltoid muscle and mildly effacing the deltoid muscle but not invading it.  There is also evidence of 13 mm short axis diameter right axillary lymph node which appeared to have a thickened cortex and was felt to be abnormal.  Dedicated ultrasound of the right axilla was recommended for further evaluation.  There were other smaller lymph nodes adjacent to the area.  Patient subsequently underwent right axillary lymph node ultrasound guided biopsy, the area contained multiple and large lymph nodes which were measuring between 3.5 and 3.2 cm.  There is at least 3 of them.  Pathology report showed lymph node tissue showing features consistent with nonspecific reactive lymphoid hyperplasia, there is no evidence of metastatic disease, nor a lymphoproliferative process or malignancy.     Patient was then referred to cancer center at [facility] in [facility], and was evaluated by her surgical oncologist Dr. Fabián Dawn.  Per review of his consultation report, the  patient had stated he had noted a new (mole) on the right arm 9 months prior to his original presentation and had enlarged over several months and began to bleed.  It was not painful and was not causing any neuromuscular issues.  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  We did discuss causes the results of the MRI above.  I was discussed for the patient to proceed with a radical resection of the right upper arm mass with placement of the wound VAC after performing the wide excision of the mass.  Surgical resection was performed on 5/26/2021 with en bloc resection of 12 cm with partial resection of the underlying right deltoid muscle.  Final wound measured 15 cm in diameter by 4 cm deep, and a wound VAC in place.  The pathology report showed that this was consistent with a malignant melanoma that measured 10.5 cm and was focally in the epidermis abutting ulcer with minute focus of possible melanoma in situ.  BRAF V600E was positive.  As the tumor staging summary, the histologic type was not determined.  The Breslow depth could not be determined.  There was ulceration present.  The mitotic rate was greater than 1/millimeter square.  Lymphovascular invasion was present.  Tumor infiltrating lymphocytes were present but nonbrisk.  There is no tumor regression noted.  The peripheral margins, and the deep margins were negative for invasive melanoma.  Tumor was 15 mm from the closest peripheral margin and 10 mm from the closest deep margin.  Plastic surgery was consulted for wound closure with skin grafting.  Patient underwent skin grafting on 6/8/2021.  Donor site was from the right thigh.     Patient was then seeking oncologic care in [facility] in [facility].  The records from that organization are not available on Care Everywhere.  The patient  states that he was receiving treatment with the local oncologist, Dr. Arce, with about 8 pills a day he was taking twice a day.  He states that he would pick them up at his doctor's office.  He states that he had had a PET scan which is showed positive lymph nodes in his right axilla.  On records available in care everywhere on 3/3/2022, there was a CT scan of the chest, abdomen and pelvis with contrast, which was compared to a PET/CT from September 16, 2021.  This states that on the prior PET/CT from September 2021 there had been enlarged right axillary lymphadenopathy that had measured 2.6 x 2.1 cm, and on the current study from March 3, 2022 it had resolved.  There was no other evidence seen of metastatic disease.  There was a 1 cm flash enhancing lesion in the right anterior lobe of the liver which was felt to be a hemangioma.     Then on July 25, 2022, it was noted that the patient had iron deficiency anemia.  At that time, on the note from the infusion center on 9/8/2022, at which time the patient was receiving Venofer infusions, the only medication listed on his med list was tramadol.  He did receive infusion of 300 mg of Venofer x6 doses as an outpatient.  Last dose was given on 10/27/2022.     On November 7, 2022, the patient received a blood transfusion for hemoglobin of 6.2, transfusion occurred on 11/8/2022.  Patient was then admitted on 12/21/2023 and discharged on 12/23/2023 to [facility], due to secondary acute anemia from blood loss secondary to GI bleed.  At that time he had presented with several weeks of dizziness, weakness, and left lower extremity swelling.  His oncologist recommended he go to the ED for evaluation.  He did a left lower extremity Doppler which was negative for DVT.  Hemoglobin at the time was 3.2.  Patient had complained of several weeks of painless red-maroon or black-colored stools which were not often.  He denied any abdominal pain.  He denied any  nausea or vomiting.  Patient received a total of 6 units of packed red cells and underwent an EGD which revealed 5 gastric ulcers with no active bleeding.  He was placed on 3 months of PPI therapy twice daily and was recommended for repeat EGD in 2 months.  At that time, the only medication listed was metformin.   Patient was then readmitted on 1/4/2023 and discharged on 1/6/2023, due to a recurrent GI bleed.  At that time, he had reported an abrupt onset of a sensation of fatigue, diaphoresis and dizziness, and then started having dark stools.  When he presented to the ED, with hypotension and hemoglobin of 8.1.  There was concern for recurrent GI bleed, however there is no evidence of overt GI bleed.  He was transfused 2 units of packed red cells, with hemoglobin up to 9.3 on day of discharge.  It was not recommended to repeat endoscopy at the time.  He was recommended to continue with the pantoprazole twice daily.  He had also had a tagged red cell scan during the first admission which was negative.       Patient presented back to his oncologist on 01/11/2023, with a hemoglobin of 6.5.  On 1/12/2023 he was then transfused 2 units of packed red cells at the outpatient infusion center at [facility].  Subsequent hemoglobin on 1/19/2023 was 7.1.  She did have repeat iron studies on 1/23/2023, with a ferritin of 12, percent iron saturation of 4%, LDH elevated to 364, with a total bilirubin of 0.4.  He again was managed with Venofer 300 mg infusions weekly x9 weeks.  With last dose administered on 5/4/2023.     Patient then had PET/CT on 2/02/2023, which at that time showed 2 thickened hypermetabolic small bowel loops with SUV max of 11.07 and 13.72.  They have felt that these findings were suspicious for an intussusception and a CT scan of the abdomen pelvis was recommended with and without contrast.  They still noted that there was no right axillary lymphadenopathy.     Patient was then readmitted on  5/17/2023 through 5/18/2023, again due to acute on chronic blood loss anemia and received 2 units of packed red cells.  It was suspected to be an upper GI bleed and was referred for outpatient capsule endoscopy.  At the time of presentation on 5/17/2023 he had offered a 4-day history of black stools and dizziness in the morning.  When he presented to the ED his hemoglobin was 5.7.  After transfusion of 2 units of packed red cells his hemoglobin was up to 7.1.  Last hemoglobin on file as outpatient on 5/25/2023 was 8.4 with ferritin of 11.     Presented to [facility] ER on 6/28/23 with c/o weakness, epigastric pain and melanotic stools.  Hgb 4.6.  PMH c/w DM, gastric ulcers with h/o GI bleed s/p IV iron.  He underwent an EGD which did not show any evidence of upper GI bleeding. There were 2 small superficial ulcers/erosions in the duodenal bulb which were not felt to be the cause of the profound anemia with hemoglobin of less than 5 on admission. Patient was recommended to have a CT enterography due to PET/CT on 2/2/2023 with 2 thickened hypermetabolic masses and small bowel which were felt to be secondary to intussusception. Capsule endoscopy was to be considered pending results. CT enterography was performed on 6/29/2023, which showed an 8.7 cm area of masslike thickening involving a loop of small bowel within the right lower quadrant. There was an additional 7 cm area of masslike thickening involving a loop of small bowel within the left lower quadrant. These findings were felt to be suspicious of metastatic disease to small bowel given best history of melanoma.     S/p en bloc small bowel resection and partial omentectomy ([facility]) on 6/30/23 - two separate foci of metastatic melanoma, 7.5 cm and 6.5 cm, BRAF mutant.      S/p cycle 4 Ipilimumab with 3 mg/Nivolumab 1 mg.  Tolerated very well.    Currently s/p cycle 18 single agent nivolumab. Tolerating well.     Fatigue:  No    Fevers:  No    Appetite/taste changes:   No    Mucositis:  No    Weight changes:  No    Nausea/vomiting:  No    Diarrhea:  No    Constipation:  No    Peripheral neuropathy:  No       Lives in [place], [place].    ECOG PS 0    Review of Systems:   Review of Systems   Eyes:  Negative for eye problems.   Respiratory:  Negative for cough and shortness of breath.    Cardiovascular:  Negative for chest pain, leg swelling and palpitations.   Gastrointestinal:  Negative for abdominal pain and blood in stool.   Endocrine:        No heat or cold intolerance   Genitourinary:  Negative for difficulty urinating, dysuria, frequency and hematuria.    Musculoskeletal:  Negative for arthralgias, back pain and neck pain.   Skin:  Negative for itching and rash.   Neurological:  Negative for dizziness, extremity weakness and headaches.   Hematological:  Negative for adenopathy. Does not bruise/bleed easily.   Psychiatric/Behavioral:  Negative for sleep disturbance.          No current outpatient medications on file.     Allergies:   No Known Allergies    Past Medical History:    Anemia    with multiple blood transfusion    GIB (gastrointestinal bleeding)    Melanoma (HCC)    right shoulder s/p surgical excision and skin graft, adjuvant BRAF/MEK inhibitors for 1.5 yrs    Personal history of antineoplastic chemotherapy     Past Surgical History:   Procedure Laterality Date    Other surgical history      small intestine surgery    Upper gi endoscopy,exam  12/01/2022     Social History     Socioeconomic History    Marital status: Single   Tobacco Use    Smoking status: Never    Smokeless tobacco: Never   Vaping Use    Vaping status: Never Used   Substance and Sexual Activity    Alcohol use: Not Currently    Drug use: Never       History reviewed. No pertinent family history.      PHYSICAL EXAM:    /82 (BP Location: Left arm, Patient Position: Sitting, Cuff Size: adult)   Pulse 55   Temp 97.7 °F (36.5 °C) (Oral)   Resp 16   Ht 1.702 m (5' 7\")   Wt 106.8 kg (235 lb 6.4 oz)    SpO2 98%   BMI 36.87 kg/m²   Wt Readings from Last 6 Encounters:   05/23/24 106.8 kg (235 lb 6.4 oz)   05/09/24 106.1 kg (234 lb)   04/25/24 105.8 kg (233 lb 3.2 oz)   04/11/24 105.7 kg (233 lb)   03/27/24 104.8 kg (231 lb)   03/13/24 105.8 kg (233 lb 3.2 oz)     Physical Exam  General: Patient is alert, not in acute distress.    HEENT: EOMs intact. PERRL, Anicteric.  MMM.   Neck: No JVD. No palpable lymphadenopathy. Neck is supple, surgical scar.  Chest: Clear to auscultation.  Heart: Regular rate and rhythm.   Abdomen: Soft, non tender with good bowel sounds.  Surgical scars.  Extremities: No edema.  Neurological: Grossly intact.   Psych/Depression: nl        ASSESSMENT/PLAN:     1. Malignant melanoma, unspecified site (HCC)    2. 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 Encounter for antineoplastic immunotherapy        Malignant melanoma of right upper extremity including shoulder (HCC)  Proceed with treatment with nivolumab and ipilimumab x 4 cycles followed by maintenance nivolumab for total of 18 to 24 months.  Discussed goal of treatment is still potentially curative with these agents.      S/p cycle 4 Ipilimumab 3 mg/Nivolumab 1 mg.      Tumor assessment after the fourth cycle of treatment with marked response to therapy.  Tumor assessment after cycle 10 with very minimal activity in the LN in the abdomen.    S/p cycle 17 maintenance nivolumab 3 mg/kg every 2 weeks.   PET/CT done today.  I personally reviewed the images and compared to last PET/CT 3 months ago.  Still [place].  Official report pending.    Proceed with cycle 18.  Given that the patient lives far, and FDA approved to the 480 mg every 4 weeks nivolumab, will switch his completion of his treatment to this dosing and schedule given his distance to the treatment center.    Restaging PET/CT due in July of 2024.    Goal is to complete a total of 18 months to 24 months of treatment from initiation of therapy.  24 months will be in August of  2025.    No orders of the defined types were placed in this encounter.    Call prn.  Follow-up in 4 weeks    MDM: high, malignancy, life threatening. Drug therapy requiring intensive monitoring for tox    Results From Past 48 Hours:  Recent Results (from the past 48 hour(s))   Comp Metabolic Panel (14)    Collection Time: 05/23/24  7:22 AM   Result Value Ref Range    Glucose 214 (H) 70 - 99 mg/dL    Sodium 138 136 - 145 mmol/L    Potassium 4.2 3.5 - 5.1 mmol/L    Chloride 107 98 - 112 mmol/L    CO2 25.0 21.0 - 32.0 mmol/L    Anion Gap 6 0 - 18 mmol/L    BUN 11 9 - 23 mg/dL    Creatinine 0.83 0.70 - 1.30 mg/dL    BUN/CREA Ratio 13.3 10.0 - 20.0    Calcium, Total 9.4 8.7 - 10.4 mg/dL    Calculated Osmolality 292 275 - 295 mOsm/kg    eGFR-Cr 101 >=60 mL/min/1.73m2    ALT 23 10 - 49 U/L    AST 18 <=34 U/L    Alkaline Phosphatase 83 45 - 117 U/L    Bilirubin, Total 0.7 0.3 - 1.2 mg/dL    Total Protein 7.3 5.7 - 8.2 g/dL    Albumin 4.4 3.2 - 4.8 g/dL    Globulin  2.9 2.0 - 3.5 g/dL    A/G Ratio 1.5 1.0 - 2.0    Patient Fasting for CMP? Patient not present    TSH [E]    Collection Time: 05/23/24  7:22 AM   Result Value Ref Range    TSH 3.083 0.550 - 4.780 mIU/mL   T4 FREE [E]    Collection Time: 05/23/24  7:22 AM   Result Value Ref Range    Free T4 1.2 0.8 - 1.7 ng/dL   CBC W/ DIFFERENTIAL    Collection Time: 05/23/24  7:22 AM   Result Value Ref Range    WBC 5.1 4.0 - 11.0 x10(3) uL    RBC 4.71 4.30 - 5.70 x10(6)uL    HGB 15.7 13.0 - 17.5 g/dL    HCT 43.6 39.0 - 53.0 %    MCV 92.6 80.0 - 100.0 fL    MCH 33.3 26.0 - 34.0 pg    MCHC 36.0 31.0 - 37.0 g/dL    RDW-SD 42.9 35.1 - 46.3 fL    RDW 12.6 11.0 - 15.0 %    .0 150.0 - 450.0 10(3)uL    Neutrophil Absolute Prelim 2.19 1.50 - 7.70 x10 (3) uL    Neutrophil Absolute 2.19 1.50 - 7.70 x10(3) uL    Lymphocyte Absolute 2.18 1.00 - 4.00 x10(3) uL    Monocyte Absolute 0.57 0.10 - 1.00 x10(3) uL    Eosinophil Absolute 0.08 0.00 - 0.70 x10(3) uL    Basophil Absolute 0.08 0.00  - 0.20 x10(3) uL    Immature Granulocyte Absolute 0.01 0.00 - 1.00 x10(3) uL    Neutrophil % 42.7 %    Lymphocyte % 42.7 %    Monocyte % 11.2 %    Eosinophil % 1.6 %    Basophil % 1.6 %    Immature Granulocyte % 0.2 %

## 2024-05-23 NOTE — PROGRESS NOTES
Pt here for C19D1 OPDIVO.  Arrives Ambulating independently, accompanied by Self   Patient was evaluated today by MD.  Oral medications included in this regimen:  no  Patient confirms comprehension of cancer treatment schedule:  yes  Pregnancy screening:  Not applicable  Modifications in dose or schedule:  Yes - pt will be switching to 4wk dosing going forward. Next treatment is 6/20. Copy of AVS provided to pt with upcoming appts.    Medications appearance and physical integrity checked by RN: yes.  Chemotherapy IV pump settings verified by 2 RNs:  No due to targeted therapy IV administration.  Frequency of blood return and site check throughout administration: Prior to administration, Prior to each drug, and At completion of therapy     Infusion/treatment outcome:  patient tolerated treatment without incident    Education Record    Learner:  Patient  Barriers / Limitations:  Language  Method:  Reinforcement  Education / instructions given:  Plan of care  Outcome:  Shows understanding    Discharged Home, Ambulating independently, accompanied by:Self    Patient/family verbalized understanding of future appointments: by printed AVS

## 2024-06-06 ENCOUNTER — APPOINTMENT (OUTPATIENT)
Dept: HEMATOLOGY/ONCOLOGY | Facility: HOSPITAL | Age: 59
End: 2024-06-06
Attending: INTERNAL MEDICINE
Payer: MEDICAID

## 2024-06-06 ENCOUNTER — APPOINTMENT (OUTPATIENT)
Dept: HEMATOLOGY/ONCOLOGY | Facility: HOSPITAL | Age: 59
End: 2024-06-06
Attending: NURSE PRACTITIONER
Payer: MEDICAID

## 2024-06-20 ENCOUNTER — OFFICE VISIT (OUTPATIENT)
Dept: HEMATOLOGY/ONCOLOGY | Facility: HOSPITAL | Age: 59
End: 2024-06-20
Attending: INTERNAL MEDICINE
Payer: MEDICAID

## 2024-06-20 VITALS
RESPIRATION RATE: 18 BRPM | SYSTOLIC BLOOD PRESSURE: 150 MMHG | DIASTOLIC BLOOD PRESSURE: 75 MMHG | TEMPERATURE: 98 F | BODY MASS INDEX: 36.57 KG/M2 | HEIGHT: 67 IN | WEIGHT: 233 LBS | OXYGEN SATURATION: 99 % | HEART RATE: 56 BPM

## 2024-06-20 DIAGNOSIS — Z51.12 ENCOUNTER FOR ANTINEOPLASTIC IMMUNOTHERAPY: ICD-10-CM

## 2024-06-20 DIAGNOSIS — C43.61 MALIGNANT MELANOMA OF RIGHT UPPER EXTREMITY INCLUDING SHOULDER (HCC): Primary | ICD-10-CM

## 2024-06-20 DIAGNOSIS — C78.89 MELANOMA METASTATIC TO DIGESTIVE ORGAN (HCC): ICD-10-CM

## 2024-06-20 LAB
ALBUMIN SERPL-MCNC: 4.4 G/DL (ref 3.2–4.8)
ALBUMIN/GLOB SERPL: 1.6 {RATIO} (ref 1–2)
ALP LIVER SERPL-CCNC: 87 U/L
ALT SERPL-CCNC: 28 U/L
ANION GAP SERPL CALC-SCNC: 6 MMOL/L (ref 0–18)
AST SERPL-CCNC: 18 U/L (ref ?–34)
BASOPHILS # BLD AUTO: 0.08 X10(3) UL (ref 0–0.2)
BASOPHILS NFR BLD AUTO: 1.3 %
BILIRUB SERPL-MCNC: 0.4 MG/DL (ref 0.3–1.2)
BUN BLD-MCNC: 15 MG/DL (ref 9–23)
BUN/CREAT SERPL: 17 (ref 10–20)
CALCIUM BLD-MCNC: 9.3 MG/DL (ref 8.7–10.4)
CHLORIDE SERPL-SCNC: 104 MMOL/L (ref 98–112)
CO2 SERPL-SCNC: 26 MMOL/L (ref 21–32)
CREAT BLD-MCNC: 0.88 MG/DL
DEPRECATED RDW RBC AUTO: 43.8 FL (ref 35.1–46.3)
EGFRCR SERPLBLD CKD-EPI 2021: 99 ML/MIN/1.73M2 (ref 60–?)
EOSINOPHIL # BLD AUTO: 0.06 X10(3) UL (ref 0–0.7)
EOSINOPHIL NFR BLD AUTO: 1 %
ERYTHROCYTE [DISTWIDTH] IN BLOOD BY AUTOMATED COUNT: 12.3 % (ref 11–15)
GLOBULIN PLAS-MCNC: 2.8 G/DL (ref 2–3.5)
GLUCOSE BLD-MCNC: 238 MG/DL (ref 70–99)
HCT VFR BLD AUTO: 45.7 %
HGB BLD-MCNC: 15.7 G/DL
IMM GRANULOCYTES # BLD AUTO: 0.04 X10(3) UL (ref 0–1)
IMM GRANULOCYTES NFR BLD: 0.7 %
LYMPHOCYTES # BLD AUTO: 1.98 X10(3) UL (ref 1–4)
LYMPHOCYTES NFR BLD AUTO: 33.2 %
MCH RBC QN AUTO: 32.8 PG (ref 26–34)
MCHC RBC AUTO-ENTMCNC: 34.4 G/DL (ref 31–37)
MCV RBC AUTO: 95.6 FL
MONOCYTES # BLD AUTO: 0.58 X10(3) UL (ref 0.1–1)
MONOCYTES NFR BLD AUTO: 9.7 %
NEUTROPHILS # BLD AUTO: 3.22 X10 (3) UL (ref 1.5–7.7)
NEUTROPHILS # BLD AUTO: 3.22 X10(3) UL (ref 1.5–7.7)
NEUTROPHILS NFR BLD AUTO: 54.1 %
OSMOLALITY SERPL CALC.SUM OF ELEC: 291 MOSM/KG (ref 275–295)
PLATELET # BLD AUTO: 244 10(3)UL (ref 150–450)
POTASSIUM SERPL-SCNC: 4.4 MMOL/L (ref 3.5–5.1)
PROT SERPL-MCNC: 7.2 G/DL (ref 5.7–8.2)
RBC # BLD AUTO: 4.78 X10(6)UL
SODIUM SERPL-SCNC: 136 MMOL/L (ref 136–145)
WBC # BLD AUTO: 6 X10(3) UL (ref 4–11)

## 2024-06-20 PROCEDURE — 99215 OFFICE O/P EST HI 40 MIN: CPT | Performed by: INTERNAL MEDICINE

## 2024-06-20 PROCEDURE — 80053 COMPREHEN METABOLIC PANEL: CPT

## 2024-06-20 PROCEDURE — 85025 COMPLETE CBC W/AUTO DIFF WBC: CPT

## 2024-06-20 PROCEDURE — 96413 CHEMO IV INFUSION 1 HR: CPT

## 2024-06-20 NOTE — PROGRESS NOTES
HPI   Cole Kendrick is a 59 year old male here for follow up of   Encounter Diagnoses   Name Primary?    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    Oncology history:   Extensive outside records from multiple facilities since March 2021 were reviewed.  This is a synopsis of those records, details can be found on Care Everywhere.  Patient had presented at [facility] in [facility] March 2021 due to a mass of the right shoulder.  At that time he underwent imaging with an MRI which showed a superficial right upper arm mass which appeared to be veins within the dermis.  It measured 3.9 cm x 8.3 cm x 5.9 cm it was felt that there was likely internal process of hemorrhage within the mass.  The mass demonstrated significant enhancement and it extended to the involved adjacent thickened areas of the dermis.  This mass was abutting the deltoid muscle and mildly effacing the deltoid muscle but not invading it.  There is also evidence of 13 mm short axis diameter right axillary lymph node which appeared to have a thickened cortex and was felt to be abnormal.  Dedicated ultrasound of the right axilla was recommended for further evaluation.  There were other smaller lymph nodes adjacent to the area.  Patient subsequently underwent right axillary lymph node ultrasound guided biopsy, the area contained multiple and large lymph nodes which were measuring between 3.5 and 3.2 cm.  There is at least 3 of them.  Pathology report showed lymph node tissue showing features consistent with nonspecific reactive lymphoid hyperplasia, there is no evidence of metastatic disease, nor a lymphoproliferative process or malignancy.     Patient was then referred to cancer center at [facility] in [facility], and was evaluated by her surgical oncologist Dr. Fabián Dawn.  Per review of his  consultation report, the patient had stated he had noted a new (mole) on the right arm 9 months prior to his original presentation and had enlarged over several months and began to bleed.  It was not painful and was not causing any neuromuscular issues.  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  We did discuss causes the results of the MRI above.  I was discussed for the patient to proceed with a radical resection of the right upper arm mass with placement of the wound VAC after performing the wide excision of the mass.  Surgical resection was performed on 5/26/2021 with en bloc resection of 12 cm with partial resection of the underlying right deltoid muscle.  Final wound measured 15 cm in diameter by 4 cm deep, and a wound VAC in place.  The pathology report showed that this was consistent with a malignant melanoma that measured 10.5 cm and was focally in the epidermis abutting ulcer with minute focus of possible melanoma in situ.  BRAF V600E was positive.  As the tumor staging summary, the histologic type was not determined.  The Breslow depth could not be determined.  There was ulceration present.  The mitotic rate was greater than 1/millimeter square.  Lymphovascular invasion was present.  Tumor infiltrating lymphocytes were present but nonbrisk.  There is no tumor regression noted.  The peripheral margins, and the deep margins were negative for invasive melanoma.  Tumor was 15 mm from the closest peripheral margin and 10 mm from the closest deep margin.  Plastic surgery was consulted for wound closure with skin grafting.  Patient underwent skin grafting on 6/8/2021.  Donor site was from the right thigh.     Patient was then seeking oncologic care in [facility] in [facility].  The records from that organization are not available on Care  Everywhere.  The patient states that he was receiving treatment with the local oncologist, Dr. Arce, with about 8 pills a day he was taking twice a day.  He states that he would pick them up at his doctor's office.  He states that he had had a PET scan which is showed positive lymph nodes in his right axilla.  On records available in care everywhere on 3/3/2022, there was a CT scan of the chest, abdomen and pelvis with contrast, which was compared to a PET/CT from September 16, 2021.  This states that on the prior PET/CT from September 2021 there had been enlarged right axillary lymphadenopathy that had measured 2.6 x 2.1 cm, and on the current study from March 3, 2022 it had resolved.  There was no other evidence seen of metastatic disease.  There was a 1 cm flash enhancing lesion in the right anterior lobe of the liver which was felt to be a hemangioma.     Then on July 25, 2022, it was noted that the patient had iron deficiency anemia.  At that time, on the note from the infusion center on 9/8/2022, at which time the patient was receiving Venofer infusions, the only medication listed on his med list was tramadol.  He did receive infusion of 300 mg of Venofer x6 doses as an outpatient.  Last dose was given on 10/27/2022.     On November 7, 2022, the patient received a blood transfusion for hemoglobin of 6.2, transfusion occurred on 11/8/2022.  Patient was then admitted on 12/21/2023 and discharged on 12/23/2023 to [facility], due to secondary acute anemia from blood loss secondary to GI bleed.  At that time he had presented with several weeks of dizziness, weakness, and left lower extremity swelling.  His oncologist recommended he go to the ED for evaluation.  He did a left lower extremity Doppler which was negative for DVT.  Hemoglobin at the time was 3.2.  Patient had complained of several weeks of painless red-maroon or black-colored stools which were not often.  He denied any abdominal  pain.  He denied any nausea or vomiting.  Patient received a total of 6 units of packed red cells and underwent an EGD which revealed 5 gastric ulcers with no active bleeding.  He was placed on 3 months of PPI therapy twice daily and was recommended for repeat EGD in 2 months.  At that time, the only medication listed was metformin.   Patient was then readmitted on 1/4/2023 and discharged on 1/6/2023, due to a recurrent GI bleed.  At that time, he had reported an abrupt onset of a sensation of fatigue, diaphoresis and dizziness, and then started having dark stools.  When he presented to the ED, with hypotension and hemoglobin of 8.1.  There was concern for recurrent GI bleed, however there is no evidence of overt GI bleed.  He was transfused 2 units of packed red cells, with hemoglobin up to 9.3 on day of discharge.  It was not recommended to repeat endoscopy at the time.  He was recommended to continue with the pantoprazole twice daily.  He had also had a tagged red cell scan during the first admission which was negative.       Patient presented back to his oncologist on 01/11/2023, with a hemoglobin of 6.5.  On 1/12/2023 he was then transfused 2 units of packed red cells at the outpatient infusion center at [facility].  Subsequent hemoglobin on 1/19/2023 was 7.1.  She did have repeat iron studies on 1/23/2023, with a ferritin of 12, percent iron saturation of 4%, LDH elevated to 364, with a total bilirubin of 0.4.  He again was managed with Venofer 300 mg infusions weekly x9 weeks.  With last dose administered on 5/4/2023.     Patient then had PET/CT on 2/02/2023, which at that time showed 2 thickened hypermetabolic small bowel loops with SUV max of 11.07 and 13.72.  They have felt that these findings were suspicious for an intussusception and a CT scan of the abdomen pelvis was recommended with and without contrast.  They still noted that there was no right axillary lymphadenopathy.     Patient was then  readmitted on 5/17/2023 through 5/18/2023, again due to acute on chronic blood loss anemia and received 2 units of packed red cells.  It was suspected to be an upper GI bleed and was referred for outpatient capsule endoscopy.  At the time of presentation on 5/17/2023 he had offered a 4-day history of black stools and dizziness in the morning.  When he presented to the ED his hemoglobin was 5.7.  After transfusion of 2 units of packed red cells his hemoglobin was up to 7.1.  Last hemoglobin on file as outpatient on 5/25/2023 was 8.4 with ferritin of 11.     Presented to [facility] ER on 6/28/23 with c/o weakness, epigastric pain and melanotic stools.  Hgb 4.6.  PMH c/w DM, gastric ulcers with h/o GI bleed s/p IV iron.  He underwent an EGD which did not show any evidence of upper GI bleeding. There were 2 small superficial ulcers/erosions in the duodenal bulb which were not felt to be the cause of the profound anemia with hemoglobin of less than 5 on admission. Patient was recommended to have a CT enterography due to PET/CT on 2/2/2023 with 2 thickened hypermetabolic masses and small bowel which were felt to be secondary to intussusception. Capsule endoscopy was to be considered pending results. CT enterography was performed on 6/29/2023, which showed an 8.7 cm area of masslike thickening involving a loop of small bowel within the right lower quadrant. There was an additional 7 cm area of masslike thickening involving a loop of small bowel within the left lower quadrant. These findings were felt to be suspicious of metastatic disease to small bowel given best history of melanoma.     [facility] - two separate foci of metastatic melanoma, 7.5 cm and 6.5 cm, BRAF mutant.      S/p cycle 4 Ipilimumab with 3 mg/Nivolumab 1 mg.  Tolerated very well.    Currently s/p cycle 19 single agent nivolumab now at 480 mg every 4 weeks. Tolerating well.     Fatigue:   No    Fevers:  No    Appetite/taste changes:  No    Mucositis:  No    Weight changes:  No    Nausea/vomiting:  No    Diarrhea:  No    Constipation:  No    Peripheral neuropathy:  No    Has been drinking lots of juice.  He saw his PCP recently and was checked for DM.  Told that had 1 month for diet and if not improved would start on meds for DM.     Lives in [place], [place].    ECOG PS 0    Review of Systems:   Review of Systems   Eyes:  Negative for eye problems.   Respiratory:  Negative for cough and shortness of breath.    Cardiovascular:  Negative for chest pain, leg swelling and palpitations.   Gastrointestinal:  Negative for abdominal pain and blood in stool.   Endocrine:        No heat or cold intolerance   Genitourinary:  Negative for difficulty urinating, dysuria, frequency and hematuria.    Musculoskeletal:  Negative for arthralgias, back pain and neck pain.   Skin:  Negative for itching and rash.   Neurological:  Negative for dizziness, extremity weakness and headaches.   Hematological:  Negative for adenopathy. Does not bruise/bleed easily.   Psychiatric/Behavioral:  Negative for sleep disturbance.          No current outpatient medications on file.     Allergies:   No Known Allergies    Past Medical History:    Anemia    with multiple blood transfusion    GIB (gastrointestinal bleeding)    Melanoma (HCC)    right shoulder s/p surgical excision and skin graft, adjuvant BRAF/MEK inhibitors for 1.5 yrs    Personal history of antineoplastic chemotherapy     Past Surgical History:   Procedure Laterality Date    Other surgical history      small intestine surgery    Upper gi endoscopy,exam  12/01/2022     Social History     Socioeconomic History    Marital status: Single   Tobacco Use    Smoking status: Never    Smokeless tobacco: Never   Vaping Use    Vaping status: Never Used   Substance and Sexual Activity    Alcohol use: Not Currently    Drug use: Never       History reviewed. No pertinent family  history.      PHYSICAL EXAM:    /75 (BP Location: Right arm, Patient Position: Sitting, Cuff Size: large)   Pulse 56   Temp 97.6 °F (36.4 °C) (Oral)   Resp 18   Ht 1.702 m (5' 7\")   Wt 105.7 kg (233 lb)   SpO2 99%   BMI 36.49 kg/m²   Wt Readings from Last 6 Encounters:   06/20/24 105.7 kg (233 lb)   05/23/24 106.8 kg (235 lb 6.4 oz)   05/09/24 106.1 kg (234 lb)   04/25/24 105.8 kg (233 lb 3.2 oz)   04/11/24 105.7 kg (233 lb)   03/27/24 104.8 kg (231 lb)     Physical Exam  General: Patient is alert, not in acute distress.    HEENT: EOMs intact. PERRL, Anicteric.  MMM.   Neck: No JVD. No palpable lymphadenopathy. Neck is supple, surgical scar.  Chest: Clear to auscultation.  Heart: Regular rate and rhythm.   Abdomen: Soft, non tender with good bowel sounds.  Surgical scars.  Extremities: No edema.  Neurological: Grossly intact.   Psych/Depression: nl        ASSESSMENT/PLAN:     1. Malignant melanoma of right upper extremity including shoulder (HCC)    2. 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 Encounter for antineoplastic immunotherapy        Malignant melanoma of right upper extremity including shoulder (HCC)  Proceed with treatment with nivolumab and ipilimumab x 4 cycles followed by maintenance nivolumab for total of 18 to 24 months.  Discussed goal of treatment is still potentially curative with these agents.      S/p cycle 4 Ipilimumab 3 mg/Nivolumab 1 mg.      Tumor assessment after the fourth cycle of treatment with marked response to therapy.  Tumor assessment after cycle 10 with very minimal activity in the LN in the abdomen.  Most recent restaging study was April 11, 2024 with stable mesenteric lymph node in the right mid abdomen with mild FDG activity compatible with stable treated disease.  Also patient had nonenlarged right paratracheal node measuring 7 mm which is mildly active which was likely reactive.  This will be followed on upcoming PET/CT 2024.    S/p cycle 19 maintenance  nivolumab 480mg every 4 weeks.       Proceed with cycle 20.  Given that the patient lives far, and FDA approved to the 480 mg every 4 weeks nivolumab, will switch his completion of his treatment to this dosing and schedule given his distance to the treatment center.    Restaging PET/CT due in July of 2024.    Hyperglycemia:  No drinking juices.  Monitor carbs and sugar intake.  Has follow up with PCP.     Goal is to complete a total of 18 months to 24 months of treatment from initiation of therapy.  24 months will be in August of 2025.    No orders of the defined types were placed in this encounter.    Call prn.  Follow-up in 4 weeks    MDM: high, malignancy, life threatening. Drug therapy requiring intensive monitoring for tox    Results From Past 48 Hours:  Recent Results (from the past 48 hour(s))   Comp Metabolic Panel (14)    Collection Time: 06/20/24  8:12 AM   Result Value Ref Range    Glucose 238 (H) 70 - 99 mg/dL    Sodium 136 136 - 145 mmol/L    Potassium 4.4 3.5 - 5.1 mmol/L    Chloride 104 98 - 112 mmol/L    CO2 26.0 21.0 - 32.0 mmol/L    Anion Gap 6 0 - 18 mmol/L    BUN 15 9 - 23 mg/dL    Creatinine 0.88 0.70 - 1.30 mg/dL    BUN/CREA Ratio 17.0 10.0 - 20.0    Calcium, Total 9.3 8.7 - 10.4 mg/dL    Calculated Osmolality 291 275 - 295 mOsm/kg    eGFR-Cr 99 >=60 mL/min/1.73m2    ALT 28 10 - 49 U/L    AST 18 <=34 U/L    Alkaline Phosphatase 87 45 - 117 U/L    Bilirubin, Total 0.4 0.3 - 1.2 mg/dL    Total Protein 7.2 5.7 - 8.2 g/dL    Albumin 4.4 3.2 - 4.8 g/dL    Globulin  2.8 2.0 - 3.5 g/dL    A/G Ratio 1.6 1.0 - 2.0    Patient Fasting for CMP? Patient not present    CBC W/ DIFFERENTIAL    Collection Time: 06/20/24  8:12 AM   Result Value Ref Range    WBC 6.0 4.0 - 11.0 x10(3) uL    RBC 4.78 4.30 - 5.70 x10(6)uL    HGB 15.7 13.0 - 17.5 g/dL    HCT 45.7 39.0 - 53.0 %    MCV 95.6 80.0 - 100.0 fL    MCH 32.8 26.0 - 34.0 pg    MCHC 34.4 31.0 - 37.0 g/dL    RDW-SD 43.8 35.1 - 46.3 fL    RDW 12.3 11.0 - 15.0 %     .0 150.0 - 450.0 10(3)uL    Neutrophil Absolute Prelim 3.22 1.50 - 7.70 x10 (3) uL    Neutrophil Absolute 3.22 1.50 - 7.70 x10(3) uL    Lymphocyte Absolute 1.98 1.00 - 4.00 x10(3) uL    Monocyte Absolute 0.58 0.10 - 1.00 x10(3) uL    Eosinophil Absolute 0.06 0.00 - 0.70 x10(3) uL    Basophil Absolute 0.08 0.00 - 0.20 x10(3) uL    Immature Granulocyte Absolute 0.04 0.00 - 1.00 x10(3) uL    Neutrophil % 54.1 %    Lymphocyte % 33.2 %    Monocyte % 9.7 %    Eosinophil % 1.0 %    Basophil % 1.3 %    Immature Granulocyte % 0.7 %

## 2024-06-20 NOTE — PROGRESS NOTES
Pt here for C20D1 Drug(s)OPdivo.  Arrives Ambulating independently, accompanied by Self     Patient was evaluated today by MD.    Oral medications included in this regimen:  no    Patient confirms comprehension of cancer treatment schedule:  yes    Pregnancy screening:  Not applicable    Modifications in dose or schedule:  No    Medications appearance and physical integrity checked by RN: yes.    Chemotherapy IV pump settings verified by 2 RNs:  No due to targeted therapy IV administration.  Frequency of blood return and site check throughout administration: Prior to administration and At completion of therapy     Infusion/treatment outcome:  patient tolerated treatment without incident    Education Record    Learner:  Patient  Barriers / Limitations:  Language  Method:  Brief focused and Discussion  Education / instructions given:  Plan of care for treatment today  Outcome:  Shows understanding    Discharged Home, Ambulating independently, accompanied by:Self    Patient/family verbalized understanding of future appointments: by printed AVS

## 2024-07-16 ENCOUNTER — TELEPHONE (OUTPATIENT)
Dept: HEMATOLOGY/ONCOLOGY | Facility: HOSPITAL | Age: 59
End: 2024-07-16

## 2024-07-16 DIAGNOSIS — C43.61 MALIGNANT MELANOMA OF RIGHT UPPER EXTREMITY INCLUDING SHOULDER (HCC): Primary | ICD-10-CM

## 2024-07-16 DIAGNOSIS — C78.89 MELANOMA METASTATIC TO DIGESTIVE ORGAN (HCC): ICD-10-CM

## 2024-07-16 NOTE — TELEPHONE ENCOUNTER
Austin Kirby is a 46year old female presenting congestion, runny nose, cough and sweating x 1 week. Pt is experiencing dysuria and urinary frequency. Medications verified, pharmacy verified     Allergies verified     Recent PHQ 2/9 Score    PHQ 2:  Date Adult PHQ 2 Score Adult PHQ 2 Interpretation   9/8/2021 0 No further screening needed       PHQ 9:        Health Maintenance Due   Topic Date Due   â¢ Breast Cancer Screening  03/05/2020   â¢ Shingles Vaccine (1 of 2) Never done   â¢ Hepatitis B Vaccine (2 of 3 - Risk 3-dose series) 10/06/2021   â¢ Diabetes Foot Exam  10/30/2021   â¢ COVID-19 Vaccine (3 - Booster for Pfizer series) 03/04/2022   â¢ Diabetes A1C  03/08/2022       Patient is due for topics as listed above but is not proceeding with Immunization(s) COVID-19, HIB and Shingles at this time. Patient would like communication of their results via:        Cell Phone:   Telephone Information:   Mobile [phone] to leave a message containing results?  Yes Patient called using language line Swazi ([phone] #[phone]). Patient informed PET scan was denied by insurance. Informed that CT scan ordered and rescheduled in place of PET on 7/18/24. Instructed to arrive at 6:30 am for CT appointment. Patient verbalizes understanding.

## 2024-07-18 ENCOUNTER — APPOINTMENT (OUTPATIENT)
Dept: NUCLEAR MEDICINE | Facility: HOSPITAL | Age: 59
End: 2024-07-18
Attending: INTERNAL MEDICINE
Payer: MEDICAID

## 2024-07-18 ENCOUNTER — OFFICE VISIT (OUTPATIENT)
Dept: HEMATOLOGY/ONCOLOGY | Facility: HOSPITAL | Age: 59
End: 2024-07-18
Attending: INTERNAL MEDICINE
Payer: MEDICAID

## 2024-07-18 ENCOUNTER — OFFICE VISIT (OUTPATIENT)
Dept: HEMATOLOGY/ONCOLOGY | Facility: HOSPITAL | Age: 59
End: 2024-07-18
Attending: NURSE PRACTITIONER
Payer: MEDICAID

## 2024-07-18 ENCOUNTER — HOSPITAL ENCOUNTER (OUTPATIENT)
Dept: CT IMAGING | Facility: HOSPITAL | Age: 59
Discharge: HOME | End: 2024-07-18
Attending: NURSE PRACTITIONER
Payer: MEDICAID

## 2024-07-18 VITALS
TEMPERATURE: 97 F | SYSTOLIC BLOOD PRESSURE: 149 MMHG | OXYGEN SATURATION: 99 % | BODY MASS INDEX: 36.36 KG/M2 | HEART RATE: 57 BPM | RESPIRATION RATE: 18 BRPM | HEIGHT: 67 IN | DIASTOLIC BLOOD PRESSURE: 89 MMHG | WEIGHT: 231.69 LBS

## 2024-07-18 DIAGNOSIS — C78.89 MELANOMA METASTATIC TO DIGESTIVE ORGAN (HCC): ICD-10-CM

## 2024-07-18 DIAGNOSIS — C43.61 MALIGNANT MELANOMA OF RIGHT UPPER EXTREMITY INCLUDING SHOULDER (HCC): ICD-10-CM

## 2024-07-18 DIAGNOSIS — Z51.12 ENCOUNTER FOR ANTINEOPLASTIC IMMUNOTHERAPY: ICD-10-CM

## 2024-07-18 DIAGNOSIS — C43.61 MALIGNANT MELANOMA OF RIGHT UPPER EXTREMITY INCLUDING SHOULDER (HCC): Primary | ICD-10-CM

## 2024-07-18 LAB
ALBUMIN SERPL-MCNC: 4.6 G/DL (ref 3.2–4.8)
ALBUMIN/GLOB SERPL: 1.9 {RATIO} (ref 1–2)
ALP LIVER SERPL-CCNC: 85 U/L
ALT SERPL-CCNC: 35 U/L
ANION GAP SERPL CALC-SCNC: 7 MMOL/L (ref 0–18)
AST SERPL-CCNC: 25 U/L (ref ?–34)
BASOPHILS # BLD AUTO: 0.07 X10(3) UL (ref 0–0.2)
BASOPHILS NFR BLD AUTO: 1.4 %
BILIRUB SERPL-MCNC: 0.6 MG/DL (ref 0.3–1.2)
BUN BLD-MCNC: 17 MG/DL (ref 9–23)
BUN/CREAT SERPL: 19.8 (ref 10–20)
CALCIUM BLD-MCNC: 9.1 MG/DL (ref 8.7–10.4)
CHLORIDE SERPL-SCNC: 106 MMOL/L (ref 98–112)
CO2 SERPL-SCNC: 26 MMOL/L (ref 21–32)
CREAT BLD-MCNC: 0.86 MG/DL
DEPRECATED RDW RBC AUTO: 42.9 FL (ref 35.1–46.3)
EGFRCR SERPLBLD CKD-EPI 2021: 100 ML/MIN/1.73M2 (ref 60–?)
EOSINOPHIL # BLD AUTO: 0.04 X10(3) UL (ref 0–0.7)
EOSINOPHIL NFR BLD AUTO: 0.8 %
ERYTHROCYTE [DISTWIDTH] IN BLOOD BY AUTOMATED COUNT: 12.3 % (ref 11–15)
GLOBULIN PLAS-MCNC: 2.4 G/DL (ref 2–3.5)
GLUCOSE BLD-MCNC: 173 MG/DL (ref 70–99)
HCT VFR BLD AUTO: 45.4 %
HGB BLD-MCNC: 15.9 G/DL
IMM GRANULOCYTES # BLD AUTO: 0.02 X10(3) UL (ref 0–1)
IMM GRANULOCYTES NFR BLD: 0.4 %
LYMPHOCYTES # BLD AUTO: 2.02 X10(3) UL (ref 1–4)
LYMPHOCYTES NFR BLD AUTO: 39.9 %
MCH RBC QN AUTO: 33.3 PG (ref 26–34)
MCHC RBC AUTO-ENTMCNC: 35 G/DL (ref 31–37)
MCV RBC AUTO: 95 FL
MONOCYTES # BLD AUTO: 0.56 X10(3) UL (ref 0.1–1)
MONOCYTES NFR BLD AUTO: 11.1 %
NEUTROPHILS # BLD AUTO: 2.35 X10 (3) UL (ref 1.5–7.7)
NEUTROPHILS # BLD AUTO: 2.35 X10(3) UL (ref 1.5–7.7)
NEUTROPHILS NFR BLD AUTO: 46.4 %
OSMOLALITY SERPL CALC.SUM OF ELEC: 294 MOSM/KG (ref 275–295)
PLATELET # BLD AUTO: 237 10(3)UL (ref 150–450)
POTASSIUM SERPL-SCNC: 4.2 MMOL/L (ref 3.5–5.1)
PROT SERPL-MCNC: 7 G/DL (ref 5.7–8.2)
RBC # BLD AUTO: 4.78 X10(6)UL
SODIUM SERPL-SCNC: 139 MMOL/L (ref 136–145)
T4 FREE SERPL-MCNC: 1.4 NG/DL (ref 0.8–1.7)
TSI SER-ACNC: 3.19 MIU/ML (ref 0.55–4.78)
WBC # BLD AUTO: 5.1 X10(3) UL (ref 4–11)

## 2024-07-18 PROCEDURE — 84443 ASSAY THYROID STIM HORMONE: CPT

## 2024-07-18 PROCEDURE — 84439 ASSAY OF FREE THYROXINE: CPT

## 2024-07-18 PROCEDURE — 96413 CHEMO IV INFUSION 1 HR: CPT

## 2024-07-18 PROCEDURE — 71260 CT THORAX DX C+: CPT | Performed by: NURSE PRACTITIONER

## 2024-07-18 PROCEDURE — 80053 COMPREHEN METABOLIC PANEL: CPT

## 2024-07-18 PROCEDURE — 74177 CT ABD & PELVIS W/CONTRAST: CPT | Performed by: NURSE PRACTITIONER

## 2024-07-18 PROCEDURE — 85025 COMPLETE CBC W/AUTO DIFF WBC: CPT

## 2024-07-18 PROCEDURE — 99215 OFFICE O/P EST HI 40 MIN: CPT | Performed by: NURSE PRACTITIONER

## 2024-07-18 NOTE — PROGRESS NOTES
Patient here for C21D1 Opdivo.  Arrives Ambulating independently, accompanied by Self. Patient denies new issues or complaints, labs reviewed. Patient seen by PANTERA Hernandez today.     Patient was evaluated today by EMIR and Treatment Nurse.    Oral medications included in this regimen:  no    Patient confirms comprehension of cancer treatment schedule:  yes    Pregnancy screening:  Not applicable    Modifications in dose or schedule:  No    Medications appearance and physical integrity checked by RN: yes.    Chemotherapy IV pump settings verified by 2 RNs:  No due to targeted therapy IV administration.  Frequency of blood return and site check throughout administration: Prior to administration and At completion of therapy     Infusion/treatment outcome:  patient tolerated treatment without incident    Education Record    Learner:  Patient  Barriers / Limitations:  Language  Method:  Reinforcement  Education / instructions given: Reinforced plan of care for day and reviewed upcoming appts via printed AVS.  Outcome:  Shows understanding    Discharged Home, Ambulating independently, accompanied by:Self    Patient/family verbalized understanding of future appointments: by printed AVS

## 2024-07-18 NOTE — PROGRESS NOTES
HPI   Cole Kendrick is a 59 year old male here for follow up of   Encounter Diagnoses   Name Primary?    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    Oncology history:   Extensive outside records from multiple facilities since March 2021 were reviewed.  This is a synopsis of those records, details can be found on Care Everywhere.  Patient had presented at [facility] in [facility] March 2021 due to a mass of the right shoulder.  At that time he underwent imaging with an MRI which showed a superficial right upper arm mass which appeared to be veins within the dermis.  It measured 3.9 cm x 8.3 cm x 5.9 cm it was felt that there was likely internal process of hemorrhage within the mass.  The mass demonstrated significant enhancement and it extended to the involved adjacent thickened areas of the dermis.  This mass was abutting the deltoid muscle and mildly effacing the deltoid muscle but not invading it.  There is also evidence of 13 mm short axis diameter right axillary lymph node which appeared to have a thickened cortex and was felt to be abnormal.  Dedicated ultrasound of the right axilla was recommended for further evaluation.  There were other smaller lymph nodes adjacent to the area.  Patient subsequently underwent right axillary lymph node ultrasound guided biopsy, the area contained multiple and large lymph nodes which were measuring between 3.5 and 3.2 cm.  There is at least 3 of them.  Pathology report showed lymph node tissue showing features consistent with nonspecific reactive lymphoid hyperplasia, there is no evidence of metastatic disease, nor a lymphoproliferative process or malignancy.     Patient was then referred to cancer center at [facility] in [facility], and was evaluated by her surgical oncologist Dr. Fabián Dawn.  Per review of his  consultation report, the patient had stated he had noted a new (mole) on the right arm 9 months prior to his original presentation and had enlarged over several months and began to bleed.  It was not painful and was not causing any neuromuscular issues.  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  We did discuss causes the results of the MRI above.  I was discussed for the patient to proceed with a radical resection of the right upper arm mass with placement of the wound VAC after performing the wide excision of the mass.  Surgical resection was performed on 5/26/2021 with en bloc resection of 12 cm with partial resection of the underlying right deltoid muscle.  Final wound measured 15 cm in diameter by 4 cm deep, and a wound VAC in place.  The pathology report showed that this was consistent with a malignant melanoma that measured 10.5 cm and was focally in the epidermis abutting ulcer with minute focus of possible melanoma in situ.  BRAF V600E was positive.  As the tumor staging summary, the histologic type was not determined.  The Breslow depth could not be determined.  There was ulceration present.  The mitotic rate was greater than 1/millimeter square.  Lymphovascular invasion was present.  Tumor infiltrating lymphocytes were present but nonbrisk.  There is no tumor regression noted.  The peripheral margins, and the deep margins were negative for invasive melanoma.  Tumor was 15 mm from the closest peripheral margin and 10 mm from the closest deep margin.  Plastic surgery was consulted for wound closure with skin grafting.  Patient underwent skin grafting on 6/8/2021.  Donor site was from the right thigh.     Patient was then seeking oncologic care in [facility] in [facility].  The records from that organization are not available on Care  Everywhere.  The patient states that he was receiving treatment with the local oncologist, Dr. Arce, with about 8 pills a day he was taking twice a day.  He states that he would pick them up at his doctor's office.  He states that he had had a PET scan which is showed positive lymph nodes in his right axilla.  On records available in care everywhere on 3/3/2022, there was a CT scan of the chest, abdomen and pelvis with contrast, which was compared to a PET/CT from September 16, 2021.  This states that on the prior PET/CT from September 2021 there had been enlarged right axillary lymphadenopathy that had measured 2.6 x 2.1 cm, and on the current study from March 3, 2022 it had resolved.  There was no other evidence seen of metastatic disease.  There was a 1 cm flash enhancing lesion in the right anterior lobe of the liver which was felt to be a hemangioma.     Then on July 25, 2022, it was noted that the patient had iron deficiency anemia.  At that time, on the note from the infusion center on 9/8/2022, at which time the patient was receiving Venofer infusions, the only medication listed on his med list was tramadol.  He did receive infusion of 300 mg of Venofer x6 doses as an outpatient.  Last dose was given on 10/27/2022.     On November 7, 2022, the patient received a blood transfusion for hemoglobin of 6.2, transfusion occurred on 11/8/2022.  Patient was then admitted on 12/21/2023 and discharged on 12/23/2023 to [facility], due to secondary acute anemia from blood loss secondary to GI bleed.  At that time he had presented with several weeks of dizziness, weakness, and left lower extremity swelling.  His oncologist recommended he go to the ED for evaluation.  He did a left lower extremity Doppler which was negative for DVT.  Hemoglobin at the time was 3.2.  Patient had complained of several weeks of painless red-maroon or black-colored stools which were not often.  He denied any abdominal  pain.  He denied any nausea or vomiting.  Patient received a total of 6 units of packed red cells and underwent an EGD which revealed 5 gastric ulcers with no active bleeding.  He was placed on 3 months of PPI therapy twice daily and was recommended for repeat EGD in 2 months.  At that time, the only medication listed was metformin.   Patient was then readmitted on 1/4/2023 and discharged on 1/6/2023, due to a recurrent GI bleed.  At that time, he had reported an abrupt onset of a sensation of fatigue, diaphoresis and dizziness, and then started having dark stools.  When he presented to the ED, with hypotension and hemoglobin of 8.1.  There was concern for recurrent GI bleed, however there is no evidence of overt GI bleed.  He was transfused 2 units of packed red cells, with hemoglobin up to 9.3 on day of discharge.  It was not recommended to repeat endoscopy at the time.  He was recommended to continue with the pantoprazole twice daily.  He had also had a tagged red cell scan during the first admission which was negative.       Patient presented back to his oncologist on 01/11/2023, with a hemoglobin of 6.5.  On 1/12/2023 he was then transfused 2 units of packed red cells at the outpatient infusion center at [facility].  Subsequent hemoglobin on 1/19/2023 was 7.1.  She did have repeat iron studies on 1/23/2023, with a ferritin of 12, percent iron saturation of 4%, LDH elevated to 364, with a total bilirubin of 0.4.  He again was managed with Venofer 300 mg infusions weekly x9 weeks.  With last dose administered on 5/4/2023.     Patient then had PET/CT on 2/02/2023, which at that time showed 2 thickened hypermetabolic small bowel loops with SUV max of 11.07 and 13.72.  They have felt that these findings were suspicious for an intussusception and a CT scan of the abdomen pelvis was recommended with and without contrast.  They still noted that there was no right axillary lymphadenopathy.     Patient was then  readmitted on 5/17/2023 through 5/18/2023, again due to acute on chronic blood loss anemia and received 2 units of packed red cells.  It was suspected to be an upper GI bleed and was referred for outpatient capsule endoscopy.  At the time of presentation on 5/17/2023 he had offered a 4-day history of black stools and dizziness in the morning.  When he presented to the ED his hemoglobin was 5.7.  After transfusion of 2 units of packed red cells his hemoglobin was up to 7.1.  Last hemoglobin on file as outpatient on 5/25/2023 was 8.4 with ferritin of 11.     Presented to [facility] ER on 6/28/23 with c/o weakness, epigastric pain and melanotic stools.  Hgb 4.6.  PMH c/w DM, gastric ulcers with h/o GI bleed s/p IV iron.  He underwent an EGD which did not show any evidence of upper GI bleeding. There were 2 small superficial ulcers/erosions in the duodenal bulb which were not felt to be the cause of the profound anemia with hemoglobin of less than 5 on admission. Patient was recommended to have a CT enterography due to PET/CT on 2/2/2023 with 2 thickened hypermetabolic masses and small bowel which were felt to be secondary to intussusception. Capsule endoscopy was to be considered pending results. CT enterography was performed on 6/29/2023, which showed an 8.7 cm area of masslike thickening involving a loop of small bowel within the right lower quadrant. There was an additional 7 cm area of masslike thickening involving a loop of small bowel within the left lower quadrant. These findings were felt to be suspicious of metastatic disease to small bowel given best history of melanoma.     S/p en bloc small bowel resection and partial omentectomy ([facility]) on 6/30/23 - two separate foci of metastatic melanoma, 7.5 cm and 6.5 cm, BRAF mutant.      S/p cycle 4 Ipilimumab with 3 mg/Nivolumab 1 mg.  Tolerated very well.    Currently s/p cycle 20 single agent nivolumab now at 480 mg every 4 weeks. Tolerating well.     Fatigue:   No    Fevers:  No    Appetite/taste changes:  No    Mucositis:  No    Weight changes:  No    Nausea/vomiting:  No    Diarrhea:  No    Constipation:  No    Peripheral neuropathy:  No    He saw his PCP recently and was checked for DM.  Told that had 1 month for diet and if not improved would start on meds for DM.     BP elevated here, states does not sleep night before appt. Checks BP at home 130/80.     Lives in [place], [place].    ECOG PS 0    Review of Systems:   Review of Systems   Eyes:  Negative for eye problems.   Respiratory:  Negative for cough and shortness of breath.    Cardiovascular:  Negative for chest pain, leg swelling and palpitations.   Gastrointestinal:  Negative for abdominal pain and blood in stool.   Endocrine:        No heat or cold intolerance   Genitourinary:  Negative for difficulty urinating, dysuria, frequency and hematuria.    Musculoskeletal:  Negative for arthralgias, back pain and neck pain.   Skin:  Negative for itching and rash.   Neurological:  Negative for dizziness, extremity weakness and headaches.   Hematological:  Negative for adenopathy. Does not bruise/bleed easily.   Psychiatric/Behavioral:  Negative for sleep disturbance.          No current outpatient medications on file.     Allergies:   No Known Allergies    Past Medical History:    Anemia    with multiple blood transfusion    GIB (gastrointestinal bleeding)    Melanoma (HCC)    right shoulder s/p surgical excision and skin graft, adjuvant BRAF/MEK inhibitors for 1.5 yrs    Personal history of antineoplastic chemotherapy     Past Surgical History:   Procedure Laterality Date    Other surgical history      small intestine surgery    Upper gi endoscopy,exam  12/01/2022     Social History     Socioeconomic History    Marital status: Single   Tobacco Use    Smoking status: Never    Smokeless tobacco: Never   Vaping Use    Vaping status: Never Used   Substance and Sexual Activity    Alcohol use: Not Currently    Drug use: Never        No family history on file.      PHYSICAL EXAM:    /89 (BP Location: Left arm, Patient Position: Sitting, Cuff Size: large)   Pulse 57   Temp 97.3 °F (36.3 °C) (Oral)   Resp 18   Ht 1.702 m (5' 7\")   Wt 105.1 kg (231 lb 11.2 oz)   SpO2 99%   BMI 36.29 kg/m²   Wt Readings from Last 6 Encounters:   06/20/24 105.7 kg (233 lb)   05/23/24 106.8 kg (235 lb 6.4 oz)   05/09/24 106.1 kg (234 lb)   04/25/24 105.8 kg (233 lb 3.2 oz)   04/11/24 105.7 kg (233 lb)   03/27/24 104.8 kg (231 lb)     Physical Exam  General: Patient is alert, not in acute distress.    HEENT: EOMs intact. PERRL, Anicteric.  MMM.   Neck: No JVD. No palpable lymphadenopathy. Neck is supple, surgical scar.  Chest: Clear to auscultation.  Heart: Regular rate and rhythm.   Abdomen: Soft, non tender with good bowel sounds.  Surgical scars.  Extremities: No edema.  Neurological: Grossly intact.   Psych/Depression: nl        ASSESSMENT/PLAN:     1. Malignant melanoma of right upper extremity including shoulder (HCC)    2. 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 Encounter for antineoplastic immunotherapy        Malignant melanoma of right upper extremity including shoulder (HCC)  Proceed with treatment with nivolumab and ipilimumab x 4 cycles followed by maintenance nivolumab for total of 18 to 24 months.  Discussed goal of treatment is still potentially curative with these agents.      S/p cycle 4 Ipilimumab 3 mg/Nivolumab 1 mg.      Tumor assessment after the fourth cycle of treatment with marked response to therapy.  Tumor assessment after cycle 10 with very minimal activity in the LN in the abdomen.  Most recent restaging study was April 11, 2024 with stable mesenteric lymph node in the right mid abdomen with mild FDG activity compatible with stable treated disease.  Also patient had nonenlarged right paratracheal node measuring 7 mm which is mildly active which was likely reactive.  This will be followed on upcoming PET/CT  2024.    S/p cycle 20 maintenance nivolumab 480mg every 4 weeks.       Proceed with cycle 21.  Given that the patient lives far, and FDA approved to the 480 mg every 4 weeks nivolumab, will switch his completion of his treatment to this dosing and schedule given his distance to the treatment center.    Restaging PET/CT due in July of 2024.Denied by insurance CT scan ordered . Results pending- stable    Hyperglycemia:  No drinking juices.  Monitor carbs and sugar intake.  Has follow up with PCP.     Goal is to complete a total of 18 months to 24 months of treatment from initiation of therapy.  24 months will be in August of 2025.    No orders of the defined types were placed in this encounter.    Call prn.  Follow-up in 4 weeks    MDM: high, malignancy, life threatening. Drug therapy requiring intensive monitoring for tox    Results From Past 48 Hours:  Recent Results (from the past 48 hour(s))   Comp Metabolic Panel (14)    Collection Time: 07/18/24  8:37 AM   Result Value Ref Range    Glucose 173 (H) 70 - 99 mg/dL    Sodium 139 136 - 145 mmol/L    Potassium 4.2 3.5 - 5.1 mmol/L    Chloride 106 98 - 112 mmol/L    CO2 26.0 21.0 - 32.0 mmol/L    Anion Gap 7 0 - 18 mmol/L    BUN 17 9 - 23 mg/dL    Creatinine 0.86 0.70 - 1.30 mg/dL    BUN/CREA Ratio 19.8 10.0 - 20.0    Calcium, Total 9.1 8.7 - 10.4 mg/dL    Calculated Osmolality 294 275 - 295 mOsm/kg    eGFR-Cr 100 >=60 mL/min/1.73m2    ALT 35 10 - 49 U/L    AST 25 <34 U/L    Alkaline Phosphatase 85 45 - 117 U/L    Bilirubin, Total 0.6 0.3 - 1.2 mg/dL    Total Protein 7.0 5.7 - 8.2 g/dL    Albumin 4.6 3.2 - 4.8 g/dL    Globulin  2.4 2.0 - 3.5 g/dL    A/G Ratio 1.9 1.0 - 2.0    Patient Fasting for CMP? Patient not present    TSH [E]    Collection Time: 07/18/24  8:37 AM   Result Value Ref Range    TSH 3.188 0.550 - 4.780 mIU/mL   T4 FREE [E]    Collection Time: 07/18/24  8:37 AM   Result Value Ref Range    Free T4 1.4 0.8 - 1.7 ng/dL   CBC W/ DIFFERENTIAL    Collection  Time: 07/18/24  8:37 AM   Result Value Ref Range    WBC 5.1 4.0 - 11.0 x10(3) uL    RBC 4.78 4.30 - 5.70 x10(6)uL    HGB 15.9 13.0 - 17.5 g/dL    HCT 45.4 39.0 - 53.0 %    MCV 95.0 80.0 - 100.0 fL    MCH 33.3 26.0 - 34.0 pg    MCHC 35.0 31.0 - 37.0 g/dL    RDW-SD 42.9 35.1 - 46.3 fL    RDW 12.3 11.0 - 15.0 %    .0 150.0 - 450.0 10(3)uL    Neutrophil Absolute Prelim 2.35 1.50 - 7.70 x10 (3) uL    Neutrophil Absolute 2.35 1.50 - 7.70 x10(3) uL    Lymphocyte Absolute 2.02 1.00 - 4.00 x10(3) uL    Monocyte Absolute 0.56 0.10 - 1.00 x10(3) uL    Eosinophil Absolute 0.04 0.00 - 0.70 x10(3) uL    Basophil Absolute 0.07 0.00 - 0.20 x10(3) uL    Immature Granulocyte Absolute 0.02 0.00 - 1.00 x10(3) uL    Neutrophil % 46.4 %    Lymphocyte % 39.9 %    Monocyte % 11.1 %    Eosinophil % 0.8 %    Basophil % 1.4 %    Immature Granulocyte % 0.4 %

## 2024-08-15 ENCOUNTER — OFFICE VISIT (OUTPATIENT)
Dept: HEMATOLOGY/ONCOLOGY | Facility: HOSPITAL | Age: 59
End: 2024-08-15
Attending: INTERNAL MEDICINE
Payer: MEDICAID

## 2024-08-15 VITALS
RESPIRATION RATE: 18 BRPM | SYSTOLIC BLOOD PRESSURE: 133 MMHG | TEMPERATURE: 98 F | OXYGEN SATURATION: 98 % | HEART RATE: 61 BPM | WEIGHT: 229 LBS | BODY MASS INDEX: 35.94 KG/M2 | HEIGHT: 67 IN | DIASTOLIC BLOOD PRESSURE: 89 MMHG

## 2024-08-15 DIAGNOSIS — Z51.12 ENCOUNTER FOR ANTINEOPLASTIC IMMUNOTHERAPY: ICD-10-CM

## 2024-08-15 DIAGNOSIS — C43.61 MALIGNANT MELANOMA OF RIGHT UPPER EXTREMITY INCLUDING SHOULDER (HCC): Primary | ICD-10-CM

## 2024-08-15 DIAGNOSIS — C78.89 MELANOMA METASTATIC TO DIGESTIVE ORGAN (HCC): ICD-10-CM

## 2024-08-15 LAB
ALBUMIN SERPL-MCNC: 4.5 G/DL (ref 3.2–4.8)
ALBUMIN/GLOB SERPL: 1.6 {RATIO} (ref 1–2)
ALP LIVER SERPL-CCNC: 85 U/L
ALT SERPL-CCNC: 42 U/L
ANION GAP SERPL CALC-SCNC: 7 MMOL/L (ref 0–18)
AST SERPL-CCNC: 29 U/L (ref ?–34)
BASOPHILS # BLD AUTO: 0.07 X10(3) UL (ref 0–0.2)
BASOPHILS NFR BLD AUTO: 1.2 %
BILIRUB SERPL-MCNC: 0.7 MG/DL (ref 0.3–1.2)
BUN BLD-MCNC: 13 MG/DL (ref 9–23)
BUN/CREAT SERPL: 15.3 (ref 10–20)
CALCIUM BLD-MCNC: 9.6 MG/DL (ref 8.7–10.4)
CHLORIDE SERPL-SCNC: 105 MMOL/L (ref 98–112)
CO2 SERPL-SCNC: 26 MMOL/L (ref 21–32)
CREAT BLD-MCNC: 0.85 MG/DL
DEPRECATED RDW RBC AUTO: 42.1 FL (ref 35.1–46.3)
EGFRCR SERPLBLD CKD-EPI 2021: 100 ML/MIN/1.73M2 (ref 60–?)
EOSINOPHIL # BLD AUTO: 0.05 X10(3) UL (ref 0–0.7)
EOSINOPHIL NFR BLD AUTO: 0.8 %
ERYTHROCYTE [DISTWIDTH] IN BLOOD BY AUTOMATED COUNT: 12.1 % (ref 11–15)
GLOBULIN PLAS-MCNC: 2.9 G/DL (ref 2–3.5)
GLUCOSE BLD-MCNC: 193 MG/DL (ref 70–99)
HCT VFR BLD AUTO: 45.7 %
HGB BLD-MCNC: 15.9 G/DL
IMM GRANULOCYTES # BLD AUTO: 0.01 X10(3) UL (ref 0–1)
IMM GRANULOCYTES NFR BLD: 0.2 %
LYMPHOCYTES # BLD AUTO: 2.25 X10(3) UL (ref 1–4)
LYMPHOCYTES NFR BLD AUTO: 37.4 %
MCH RBC QN AUTO: 32.9 PG (ref 26–34)
MCHC RBC AUTO-ENTMCNC: 34.8 G/DL (ref 31–37)
MCV RBC AUTO: 94.6 FL
MONOCYTES # BLD AUTO: 0.66 X10(3) UL (ref 0.1–1)
MONOCYTES NFR BLD AUTO: 11 %
NEUTROPHILS # BLD AUTO: 2.97 X10 (3) UL (ref 1.5–7.7)
NEUTROPHILS # BLD AUTO: 2.97 X10(3) UL (ref 1.5–7.7)
NEUTROPHILS NFR BLD AUTO: 49.4 %
OSMOLALITY SERPL CALC.SUM OF ELEC: 291 MOSM/KG (ref 275–295)
PLATELET # BLD AUTO: 223 10(3)UL (ref 150–450)
POTASSIUM SERPL-SCNC: 4.4 MMOL/L (ref 3.5–5.1)
PROT SERPL-MCNC: 7.4 G/DL (ref 5.7–8.2)
RBC # BLD AUTO: 4.83 X10(6)UL
SODIUM SERPL-SCNC: 138 MMOL/L (ref 136–145)
WBC # BLD AUTO: 6 X10(3) UL (ref 4–11)

## 2024-08-15 PROCEDURE — 96413 CHEMO IV INFUSION 1 HR: CPT

## 2024-08-15 PROCEDURE — 80053 COMPREHEN METABOLIC PANEL: CPT

## 2024-08-15 PROCEDURE — 85025 COMPLETE CBC W/AUTO DIFF WBC: CPT

## 2024-08-15 NOTE — PROGRESS NOTES
Patient here for C22D1 Opdivo.  Arrives Ambulating independently, accompanied by Self    Patient was evaluated today by MD and Treatment Nurse. No complaints today    Oral medications included in this regimen:  no    Patient confirms comprehension of cancer treatment schedule:  yes    Pregnancy screening:  Not applicable    Modifications in dose or schedule:  No    Medications appearance and physical integrity checked by RN: yes.    Chemotherapy IV pump settings verified by 2 RNs:  No due to targeted therapy IV administration.  Frequency of blood return and site check throughout administration: Prior to administration and At completion of therapy     Infusion/treatment outcome:  patient tolerated treatment without incident    Education Record    Learner:  Patient  Barriers / Limitations:  Language  Method:  Reinforcement  Education / instructions given: Reviewed plan of care and future appointments  Outcome:  Shows understanding    Discharged Home, Ambulating independently, accompanied by:Self    Patient/family verbalized understanding of future appointments: by printed AVS

## 2024-08-15 NOTE — PROGRESS NOTES
HPI   Cole Kendrick is a 59 year old male here for follow up of   Encounter Diagnoses   Name Primary?    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    Oncology history:   Extensive outside records from multiple facilities since March 2021 were reviewed.  This is a synopsis of those records, details can be found on Care Everywhere.  Patient had presented at [facility] in [facility] March 2021 due to a mass of the right shoulder.  At that time he underwent imaging with an MRI which showed a superficial right upper arm mass which appeared to be veins within the dermis.  It measured 3.9 cm x 8.3 cm x 5.9 cm it was felt that there was likely internal process of hemorrhage within the mass.  The mass demonstrated significant enhancement and it extended to the involved adjacent thickened areas of the dermis.  This mass was abutting the deltoid muscle and mildly effacing the deltoid muscle but not invading it.  There is also evidence of 13 mm short axis diameter right axillary lymph node which appeared to have a thickened cortex and was felt to be abnormal.  Dedicated ultrasound of the right axilla was recommended for further evaluation.  There were other smaller lymph nodes adjacent to the area.  Patient subsequently underwent right axillary lymph node ultrasound guided biopsy, the area contained multiple and large lymph nodes which were measuring between 3.5 and 3.2 cm.  There is at least 3 of them.  Pathology report showed lymph node tissue showing features consistent with nonspecific reactive lymphoid hyperplasia, there is no evidence of metastatic disease, nor a lymphoproliferative process or malignancy.     Patient was then referred to cancer center at [facility] in [facility], and was evaluated by her surgical oncologist Dr. Fabián Dawn.  Per review of his  consultation report, the patient had stated he had noted a new (mole) on the right arm 9 months prior to his original presentation and had enlarged over several months and began to bleed.  It was not painful and was not causing any neuromuscular issues.  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  We did discuss causes the results of the MRI above.  I was discussed for the patient to proceed with a radical resection of the right upper arm mass with placement of the wound VAC after performing the wide excision of the mass.  Surgical resection was performed on 5/26/2021 with en bloc resection of 12 cm with partial resection of the underlying right deltoid muscle.  Final wound measured 15 cm in diameter by 4 cm deep, and a wound VAC in place.  The pathology report showed that this was consistent with a malignant melanoma that measured 10.5 cm and was focally in the epidermis abutting ulcer with minute focus of possible melanoma in situ.  BRAF V600E was positive.  As the tumor staging summary, the histologic type was not determined.  The Breslow depth could not be determined.  There was ulceration present.  The mitotic rate was greater than 1/millimeter square.  Lymphovascular invasion was present.  Tumor infiltrating lymphocytes were present but nonbrisk.  There is no tumor regression noted.  The peripheral margins, and the deep margins were negative for invasive melanoma.  Tumor was 15 mm from the closest peripheral margin and 10 mm from the closest deep margin.  Plastic surgery was consulted for wound closure with skin grafting.  Patient underwent skin grafting on 6/8/2021.  Donor site was from the right thigh.     Patient was then seeking oncologic care in [facility] in [facility].  The records from that organization are not available on Care  Everywhere.  The patient states that he was receiving treatment with the local oncologist, Dr. Arce, with about 8 pills a day he was taking twice a day.  He states that he would pick them up at his doctor's office.  He states that he had had a PET scan which is showed positive lymph nodes in his right axilla.  On records available in care everywhere on 3/3/2022, there was a CT scan of the chest, abdomen and pelvis with contrast, which was compared to a PET/CT from September 16, 2021.  This states that on the prior PET/CT from September 2021 there had been enlarged right axillary lymphadenopathy that had measured 2.6 x 2.1 cm, and on the current study from March 3, 2022 it had resolved.  There was no other evidence seen of metastatic disease.  There was a 1 cm flash enhancing lesion in the right anterior lobe of the liver which was felt to be a hemangioma.     Then on July 25, 2022, it was noted that the patient had iron deficiency anemia.  At that time, on the note from the infusion center on 9/8/2022, at which time the patient was receiving Venofer infusions, the only medication listed on his med list was tramadol.  He did receive infusion of 300 mg of Venofer x6 doses as an outpatient.  Last dose was given on 10/27/2022.     On November 7, 2022, the patient received a blood transfusion for hemoglobin of 6.2, transfusion occurred on 11/8/2022.  Patient was then admitted on 12/21/2023 and discharged on 12/23/2023 to [facility], due to secondary acute anemia from blood loss secondary to GI bleed.  At that time he had presented with several weeks of dizziness, weakness, and left lower extremity swelling.  His oncologist recommended he go to the ED for evaluation.  He did a left lower extremity Doppler which was negative for DVT.  Hemoglobin at the time was 3.2.  Patient had complained of several weeks of painless red-maroon or black-colored stools which were not often.  He denied any abdominal  pain.  He denied any nausea or vomiting.  Patient received a total of 6 units of packed red cells and underwent an EGD which revealed 5 gastric ulcers with no active bleeding.  He was placed on 3 months of PPI therapy twice daily and was recommended for repeat EGD in 2 months.  At that time, the only medication listed was metformin.   Patient was then readmitted on 1/4/2023 and discharged on 1/6/2023, due to a recurrent GI bleed.  At that time, he had reported an abrupt onset of a sensation of fatigue, diaphoresis and dizziness, and then started having dark stools.  When he presented to the ED, with hypotension and hemoglobin of 8.1.  There was concern for recurrent GI bleed, however there is no evidence of overt GI bleed.  He was transfused 2 units of packed red cells, with hemoglobin up to 9.3 on day of discharge.  It was not recommended to repeat endoscopy at the time.  He was recommended to continue with the pantoprazole twice daily.  He had also had a tagged red cell scan during the first admission which was negative.       Patient presented back to his oncologist on 01/11/2023, with a hemoglobin of 6.5.  On 1/12/2023 he was then transfused 2 units of packed red cells at the outpatient infusion center at [facility].  Subsequent hemoglobin on 1/19/2023 was 7.1.  She did have repeat iron studies on 1/23/2023, with a ferritin of 12, percent iron saturation of 4%, LDH elevated to 364, with a total bilirubin of 0.4.  He again was managed with Venofer 300 mg infusions weekly x9 weeks.  With last dose administered on 5/4/2023.     Patient then had PET/CT on 2/02/2023, which at that time showed 2 thickened hypermetabolic small bowel loops with SUV max of 11.07 and 13.72.  They have felt that these findings were suspicious for an intussusception and a CT scan of the abdomen pelvis was recommended with and without contrast.  They still noted that there was no right axillary lymphadenopathy.     Patient was then  readmitted on 5/17/2023 through 5/18/2023, again due to acute on chronic blood loss anemia and received 2 units of packed red cells.  It was suspected to be an upper GI bleed and was referred for outpatient capsule endoscopy.  At the time of presentation on 5/17/2023 he had offered a 4-day history of black stools and dizziness in the morning.  When he presented to the ED his hemoglobin was 5.7.  After transfusion of 2 units of packed red cells his hemoglobin was up to 7.1.  Last hemoglobin on file as outpatient on 5/25/2023 was 8.4 with ferritin of 11.     Presented to [facility] on 6/28/23 with c/o weakness, epigastric pain and melanotic stools.  Hgb 4.6.  PMH c/w DM, gastric ulcers with h/o GI bleed s/p IV iron.  He underwent an EGD which did not show any evidence of upper GI bleeding. There were 2 small superficial ulcers/erosions in the duodenal bulb which were not felt to be the cause of the profound anemia with hemoglobin of less than 5 on admission. Patient was recommended to have a CT enterography due to PET/CT on 2/2/2023 with 2 thickened hypermetabolic masses and small bowel which were felt to be secondary to intussusception. Capsule endoscopy was to be considered pending results. CT enterography was performed on 6/29/2023, which showed an 8.7 cm area of masslike thickening involving a loop of small bowel within the right lower quadrant. There was an additional 7 cm area of masslike thickening involving a loop of small bowel within the left lower quadrant. These findings were felt to be suspicious of metastatic disease to small bowel given best history of melanoma.     S/p en bloc small bowel resection and partial omentectomy ([facility]) on 6/30/23 - two separate foci of metastatic melanoma, 7.5 cm and 6.5 cm, BRAF mutant.      S/p cycle 4 Ipilimumab with 3 mg/Nivolumab 1 mg.  Tolerated very well.    Currently s/p cycle 21 single agent nivolumab now at 480 mg every 4 weeks. Tolerating well.     Fatigue:   No    Fevers:  No    Appetite/taste changes:  No    Mucositis:  No    Weight changes:  No    Nausea/vomiting:  No    Diarrhea:  No    Constipation:  No    Peripheral neuropathy:  No    He saw his PCP recently and was checked for DM.  Told that had 1 month for diet and if not improved would start on meds for DM.     Lives in [place], [place].    ECOG PS 0    Review of Systems:   Review of Systems   Eyes:  Negative for eye problems.   Respiratory:  Negative for cough and shortness of breath.    Cardiovascular:  Negative for chest pain, leg swelling and palpitations.   Gastrointestinal:  Negative for abdominal pain and blood in stool.   Endocrine:        No heat or cold intolerance   Genitourinary:  Negative for difficulty urinating, dysuria, frequency and hematuria.    Musculoskeletal:  Negative for arthralgias, back pain and neck pain.   Skin:  Negative for itching and rash.   Neurological:  Negative for dizziness, extremity weakness and headaches.   Hematological:  Negative for adenopathy. Does not bruise/bleed easily.   Psychiatric/Behavioral:  Negative for sleep disturbance.          No current outpatient medications on file.     Allergies:   No Known Allergies    Past Medical History:    Anemia    with multiple blood transfusion    GIB (gastrointestinal bleeding)    Melanoma (HCC)    right shoulder s/p surgical excision and skin graft, adjuvant BRAF/MEK inhibitors for 1.5 yrs    Personal history of antineoplastic chemotherapy     Past Surgical History:   Procedure Laterality Date    Other surgical history      small intestine surgery    Upper gi endoscopy,exam  12/01/2022     Social History     Socioeconomic History    Marital status: Single   Tobacco Use    Smoking status: Never    Smokeless tobacco: Never   Vaping Use    Vaping status: Never Used   Substance and Sexual Activity    Alcohol use: Not Currently    Drug use: Never       No family history on file.      PHYSICAL EXAM:    /89 (BP Location: Right arm,  Patient Position: Sitting, Cuff Size: large)   Pulse 61   Temp 97.7 °F (36.5 °C) (Oral)   Resp 18   Ht 1.702 m (5' 7\")   Wt 103.9 kg (229 lb)   SpO2 98%   BMI 35.87 kg/m²   Wt Readings from Last 6 Encounters:   08/15/24 103.9 kg (229 lb)   07/18/24 105.1 kg (231 lb 11.2 oz)   06/20/24 105.7 kg (233 lb)   05/23/24 106.8 kg (235 lb 6.4 oz)   05/09/24 106.1 kg (234 lb)   04/25/24 105.8 kg (233 lb 3.2 oz)     Physical Exam  General: Patient is alert, not in acute distress.    HEENT: EOMs intact. PERRL, Anicteric.  MMM.   Neck: No JVD. No palpable lymphadenopathy. Neck is supple, surgical scar.  Chest: Clear to auscultation.  Heart: Regular rate and rhythm.   Abdomen: Soft, non tender with good bowel sounds.  Surgical scars.  Extremities: No edema.  Neurological: Grossly intact.   Psych/Depression: nl        ASSESSMENT/PLAN:     1. Malignant melanoma of right upper extremity including shoulder (HCC)    2. 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 Encounter for antineoplastic immunotherapy        Malignant melanoma of right upper extremity including shoulder (HCC)  Proceed with treatment with nivolumab and ipilimumab x 4 cycles followed by maintenance nivolumab for total of 18 to 24 months.  Discussed goal of treatment is still potentially curative with these agents.      S/p cycle 4 Ipilimumab 3 mg/Nivolumab 1 mg.      Tumor assessment after the fourth cycle of treatment with marked response to therapy.  Tumor assessment after cycle 10 with very minimal activity in the LN in the abdomen.  Most recent restaging study was April 11, 2024 with stable mesenteric lymph node in the right mid abdomen with mild FDG activity compatible with stable treated disease.  Also patient had nonenlarged right paratracheal node measuring 7 mm which is mildly active which was likely reactive.  This will be followed on upcoming PET/CT 2024.    S/p cycle 21 maintenance nivolumab 480mg every 4 weeks.       Proceed with cycle 22.   Given that the patient lives far, and FDA approved to the 480 mg every 4 weeks nivolumab, will switch his completion of his treatment to this dosing and schedule given his distance to the treatment center.    Restaging PET/CT due in July of 2024. Denied by insurance CT scan ordered.  He does not show any evidence of recurrence.  There are some findings on the CT scan, which would be best evaluated for disease recurrence on PET/CT which is a superior imaging modality for evaluation of melanoma.  Patient will be due for imaging in 3 months which may October 2024 and this should be with a PET/CT.     Hyperglycemia:  No drinking juices.  Monitor carbs and sugar intake.  Has follow up with PCP.     Goal is to complete a total of 18 months to 24 months of treatment from initiation of therapy.  24 months will be in August of 2025.    No orders of the defined types were placed in this encounter.    Call prn.  Follow-up in 4 weeks    MDM: high risk    Results From Past 48 Hours:  Recent Results (from the past 48 hour(s))   CBC W Differential W Platelet    Collection Time: 08/15/24 10:44 AM   Result Value Ref Range    WBC 6.0 4.0 - 11.0 x10(3) uL    RBC 4.83 4.30 - 5.70 x10(6)uL    HGB 15.9 13.0 - 17.5 g/dL    HCT 45.7 39.0 - 53.0 %    MCV 94.6 80.0 - 100.0 fL    MCH 32.9 26.0 - 34.0 pg    MCHC 34.8 31.0 - 37.0 g/dL    RDW-SD 42.1 35.1 - 46.3 fL    RDW 12.1 11.0 - 15.0 %    .0 150.0 - 450.0 10(3)uL    Neutrophil Absolute Prelim 2.97 1.50 - 7.70 x10 (3) uL    Neutrophil Absolute 2.97 1.50 - 7.70 x10(3) uL    Lymphocyte Absolute 2.25 1.00 - 4.00 x10(3) uL    Monocyte Absolute 0.66 0.10 - 1.00 x10(3) uL    Eosinophil Absolute 0.05 0.00 - 0.70 x10(3) uL    Basophil Absolute 0.07 0.00 - 0.20 x10(3) uL    Immature Granulocyte Absolute 0.01 0.00 - 1.00 x10(3) uL    Neutrophil % 49.4 %    Lymphocyte % 37.4 %    Monocyte % 11.0 %    Eosinophil % 0.8 %    Basophil % 1.2 %    Immature Granulocyte % 0.2 %   Comp Metabolic Panel  (14)    Collection Time: 08/15/24 10:44 AM   Result Value Ref Range    Glucose 193 (H) 70 - 99 mg/dL    Sodium 138 136 - 145 mmol/L    Potassium 4.4 3.5 - 5.1 mmol/L    Chloride 105 98 - 112 mmol/L    CO2 26.0 21.0 - 32.0 mmol/L    Anion Gap 7 0 - 18 mmol/L    BUN 13 9 - 23 mg/dL    Creatinine 0.85 0.70 - 1.30 mg/dL    BUN/CREA Ratio 15.3 10.0 - 20.0    Calcium, Total 9.6 8.7 - 10.4 mg/dL    Calculated Osmolality 291 275 - 295 mOsm/kg    eGFR-Cr 100 >=60 mL/min/1.73m2    ALT 42 10 - 49 U/L    AST 29 <34 U/L    Alkaline Phosphatase 85 45 - 117 U/L    Bilirubin, Total 0.7 0.3 - 1.2 mg/dL    Total Protein 7.4 5.7 - 8.2 g/dL    Albumin 4.5 3.2 - 4.8 g/dL    Globulin  2.9 2.0 - 3.5 g/dL    A/G Ratio 1.6 1.0 - 2.0    Patient Fasting for CMP? Patient not present      PROCEDURE: CT CHEST ABDOMEN PELVIS (ALL CONTRAST ONLY) (CPT=71260/29253)     COMPARISON: [facility], CT ENTEROGRAPHY ABD PEL W CONTRAST SH (CPT-01728), 6/29/2023, 4:15 PM.  [facility], PET (NON-STAND) SCANS(SKULL TO TOES) (CPT=78816), 4/11/2024, 8:27 AM.  [facility], PET (NON-STAND) SCANS(SKULL TO TOES) (CPT=78816), 1/09/2024, 9:01 AM.     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  Automated exposure control for dose reduction was used. Adjustment of the mA and/or kV was done based on the patient's size. Use of iterative  reconstruction technique for dose reduction was used.  Dose information is transmitted to the ACR (American College of Radiology) NRDR (National Radiology Data Registry) which includes the Dose Index Registry.     FINDINGS:     CARDIAC:   The heart is within normal limits of size.  No visualized coronary artery calcifications.  No pericardial effusion.      MEDIASTINUM/KIERSTEN:   No significant change in the 7 mm right paratracheal lymph node (2:39).  Multiple additional smaller mediastinal and hilar lymph nodes are also not significantly changed.     LUNGS:   Minimal bibasilar atelectasis/scarring.  There is scattered peribronchial thickening.  There is a subcentimeter calcified granuloma abutting the pleura in the right upper lobe (3:28).  Stable 3 mm pulmonary nodule involving the anterior aspect  of the right upper lobe (2:48).  Stable 2 mm pulmonary nodule in the lateral aspect of the right upper lobe (2:46).  Stable 2 mm pulmonary nodule along the right minor fissure (2:56).  This nodule is triangular shaped and likely reflects a intrapulmonary   lymph node.  No new or enlarging pulmonary nodule.     VASCULATURE:   The main pulmonary artery is normal in caliber.  No acute pulmonary embolism through the lobar pulmonary arteries.     THORACIC AORTA: No aneurysm or dissection.       PLEURA:   No mass or effusion.       CHEST WALL: No axillary mass or enlarged adenopathy.       LOWER CHEST: The lung bases are clear.     HEPATOBILIARY:   The liver is decreased in attenuation/enhancement when compared to the spleen, which may reflect hepatic steatosis or be secondary to phase of contrast.  There is a 5 mm region of hyperenhancement involving the periphery of the right  hepatic lobe (2:83, 5:38); no FDG-avidity was appreciated in this region on the prior PET-CT in the lesion may have been present on the prior CT enterography dated 06/29/2025.  No acute cholecystitis.  No biliary ductal dilatation.     SPLEEN:   No enlargement or focal lesion.       PANCREAS:   No lesion, fluid collection, ductal dilatation, or atrophy.       ADRENALS:   No mass or enlargement.       GENITOURINARY:   No hydronephrosis or urinary calculus.  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  No enhancing renal mass.  There is a 3.0 cm right lower pole renal cyst (5:68).  The bladder is  unremarkable.     GI TRACT:   There are postoperative changes from prior partial small bowel resection.  There is a short segment region of circumferential bowel wall thickening near the inferior small bowel-small bowel anastomosis (2:140).  No definitive FDG-avidity was   demonstrated in this region on the prior PET-CT.  No bowel obstruction.  The appendix is unremarkable.  No acute appendicitis.  Moderate colonic stool burden.     PELVIC ORGANS: The prostate is unchanged in size.     AORTA/VASCULAR:   No aneurysm or dissection.  Minimal atherosclerotic calcification of the abdominal aorta and its branching vessels.     RETROPERITONEUM:   No mass or enlarged adenopathy.       PERITONEUM: No pneumoperitoneum or ascites.     LYMPH NODES: No evidence of lymphadenopathy.  Stable 7 mm gastrohepatic ligament lymph node (2:97).  There is a 8 x 10 mm mesenteric lymph node, which previously measured 12 x 18 mm (2:133).  No new or enlarging mesenteric lymph node.  Multiple  additional subcentimeter mesenteric lymph nodes are not significantly changed when accounting for differences in technique.     BONES: No acute fracture. No aggressive osseous lesion.  Multilevel degenerative changes of the thoracic spine.  Multilevel degenerative changes of the lumbar spine, which are most advanced at L4-L5 and L5-S1.  Mild multifocal degenerative change  involving the osseous pelvis.     OTHER:   There is diastasis rectus with a few small fat containing ventral abdominal hernias.  There are bilateral fat containing inguinal hernias.                  Impression   CONCLUSION:     CT CHEST:     1. Stable 7 mm right paratracheal lymph node.  No new or enlarging mediastinal lymph nodes.  2. Multiple stable micronodules in the right lung measuring up to 3 mm.  These findings are favored to be infectious/inflammatory.  3. No new or progressive metastatic disease in the chest.     CT ABDOMEN AND PELVIS:     1. Postoperative changes from prior  partial small bowel resection with a short segment of mild circumferential bowel wall thickening near the inferior anastomosis site.  This finding is nonspecific, but favored to be secondary to an intussusception in  this region, although residual/recurrent disease is not entirely excluded.  Recommend continued attention on follow-up imaging.  2. A 5 mm hyperenhancing lesion involving the periphery of the right hepatic lobe, which is incompletely characterized but favored to reflect a perfusion variant or flash filling hemangioma.  No FDG-avidity was demonstrated in this region on the prior  PET-CT and the lesion may have been present on the prior CT enterography.    3. Mild decrease in size of the dominant mesenteric lymph node measuring 8 mm in short axis.  No new or enlarging abdominopelvic lymph node.  4. Lesser incidental findings described above.             Dictated by (CST): Fabián Chaudhari MD on 7/18/2024 at 12:19 PM      Finalized by (CST): Fabián Chaudhari MD on 7/18/2024 at 12:46 PM

## 2024-09-12 ENCOUNTER — NURSE ONLY (OUTPATIENT)
Dept: HEMATOLOGY/ONCOLOGY | Facility: HOSPITAL | Age: 59
End: 2024-09-12
Attending: INTERNAL MEDICINE

## 2024-09-12 ENCOUNTER — OFFICE VISIT (OUTPATIENT)
Dept: HEMATOLOGY/ONCOLOGY | Facility: HOSPITAL | Age: 59
End: 2024-09-12
Attending: NURSE PRACTITIONER

## 2024-09-12 VITALS
HEIGHT: 67 IN | HEART RATE: 58 BPM | OXYGEN SATURATION: 99 % | BODY MASS INDEX: 36.73 KG/M2 | RESPIRATION RATE: 18 BRPM | DIASTOLIC BLOOD PRESSURE: 72 MMHG | TEMPERATURE: 98 F | WEIGHT: 234 LBS | SYSTOLIC BLOOD PRESSURE: 127 MMHG

## 2024-09-12 DIAGNOSIS — Z51.12 ENCOUNTER FOR ANTINEOPLASTIC IMMUNOTHERAPY: ICD-10-CM

## 2024-09-12 DIAGNOSIS — C43.61 MALIGNANT MELANOMA OF RIGHT UPPER EXTREMITY INCLUDING SHOULDER (HCC): Primary | ICD-10-CM

## 2024-09-12 DIAGNOSIS — C78.89 MELANOMA METASTATIC TO DIGESTIVE ORGAN (HCC): ICD-10-CM

## 2024-09-12 LAB
ALBUMIN SERPL-MCNC: 4.3 G/DL (ref 3.2–4.8)
ALBUMIN/GLOB SERPL: 1.4 {RATIO} (ref 1–2)
ALP LIVER SERPL-CCNC: 83 U/L
ALT SERPL-CCNC: 31 U/L
ALT SERPL-CCNC: 34 U/L
ANION GAP SERPL CALC-SCNC: 8 MMOL/L (ref 0–18)
AST SERPL-CCNC: 18 U/L (ref ?–34)
BASOPHILS # BLD AUTO: 0.08 X10(3) UL (ref 0–0.2)
BASOPHILS NFR BLD AUTO: 1.3 %
BILIRUB SERPL-MCNC: 0.5 MG/DL (ref 0.3–1.2)
BUN BLD-MCNC: 19 MG/DL (ref 9–23)
CALCIUM BLD-MCNC: 9.1 MG/DL (ref 8.7–10.4)
CHLORIDE SERPL-SCNC: 104 MMOL/L (ref 98–112)
CO2 SERPL-SCNC: 24 MMOL/L (ref 21–32)
CREAT BLD-MCNC: 0.88 MG/DL
DEPRECATED RDW RBC AUTO: 43.9 FL (ref 35.1–46.3)
EGFRCR SERPLBLD CKD-EPI 2021: 99 ML/MIN/1.73M2 (ref 60–?)
EOSINOPHIL # BLD AUTO: 0.08 X10(3) UL (ref 0–0.7)
EOSINOPHIL NFR BLD AUTO: 1.3 %
ERYTHROCYTE [DISTWIDTH] IN BLOOD BY AUTOMATED COUNT: 12.5 % (ref 11–15)
GLOBULIN PLAS-MCNC: 3 G/DL (ref 2–3.5)
GLUCOSE BLD-MCNC: 270 MG/DL (ref 70–99)
HCT VFR BLD AUTO: 44.6 %
HGB BLD-MCNC: 15.4 G/DL
IMM GRANULOCYTES # BLD AUTO: 0.04 X10(3) UL (ref 0–1)
IMM GRANULOCYTES NFR BLD: 0.7 %
LYMPHOCYTES # BLD AUTO: 2.06 X10(3) UL (ref 1–4)
LYMPHOCYTES NFR BLD AUTO: 33.8 %
MCH RBC QN AUTO: 32.9 PG (ref 26–34)
MCHC RBC AUTO-ENTMCNC: 34.5 G/DL (ref 31–37)
MCV RBC AUTO: 95.3 FL
MONOCYTES # BLD AUTO: 0.61 X10(3) UL (ref 0.1–1)
MONOCYTES NFR BLD AUTO: 10 %
NEUTROPHILS # BLD AUTO: 3.22 X10 (3) UL (ref 1.5–7.7)
NEUTROPHILS # BLD AUTO: 3.22 X10(3) UL (ref 1.5–7.7)
NEUTROPHILS NFR BLD AUTO: 52.9 %
PLATELET # BLD AUTO: 264 10(3)UL (ref 150–450)
PLATELETS.RETICULATED NFR BLD AUTO: 1.4 % (ref 0–7)
POTASSIUM SERPL-SCNC: 4.3 MMOL/L (ref 3.5–5.1)
PROT SERPL-MCNC: 7.3 G/DL (ref 5.7–8.2)
RBC # BLD AUTO: 4.68 X10(6)UL
SODIUM SERPL-SCNC: 136 MMOL/L (ref 136–145)
TSI SER-ACNC: 2.68 MIU/ML (ref 0.55–4.78)
WBC # BLD AUTO: 6.1 X10(3) UL (ref 4–11)

## 2024-09-12 PROCEDURE — 99215 OFFICE O/P EST HI 40 MIN: CPT | Performed by: NURSE PRACTITIONER

## 2024-09-12 PROCEDURE — 96413 CHEMO IV INFUSION 1 HR: CPT

## 2024-09-12 PROCEDURE — 84443 ASSAY THYROID STIM HORMONE: CPT

## 2024-09-12 PROCEDURE — 85025 COMPLETE CBC W/AUTO DIFF WBC: CPT

## 2024-09-12 PROCEDURE — 80053 COMPREHEN METABOLIC PANEL: CPT

## 2024-09-12 NOTE — PROGRESS NOTES
Patient here for C23D1 Opdivo.  Arrives Ambulating independently, accompanied by Self    Patient was evaluated today by MD and Treatment Nurse. No complaints today    Oral medications included in this regimen:  no    Patient confirms comprehension of cancer treatment schedule:  yes    Pregnancy screening:  Not applicable    Modifications in dose or schedule:  No    Medications appearance and physical integrity checked by RN: yes.    Chemotherapy IV pump settings verified by 2 RNs:  No due to targeted therapy IV administration.  Frequency of blood return and site check throughout administration: Prior to administration and At completion of therapy     Infusion/treatment outcome:  patient tolerated treatment without incident    Education Record    Learner:  Patient  Barriers / Limitations:  Language  Method:  Reinforcement  Education / instructions given: Reviewed plan of care and future appointments  Outcome:  Shows understanding    Discharged Home, Ambulating independently, accompanied by:Self    Patient/family verbalized understanding of future appointments: by printed AVS

## 2024-09-12 NOTE — PROGRESS NOTES
HPI   Cole Kendrick is a 59 year old male here for follow up of   Encounter Diagnoses   Name Primary?    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    Oncology history:   Extensive outside records from multiple facilities since March 2021 were reviewed.  This is a synopsis of those records, details can be found on Care Everywhere.  Patient had presented at [facility] in [facility] March 2021 due to a mass of the right shoulder.  At that time he underwent imaging with an MRI which showed a superficial right upper arm mass which appeared to be veins within the dermis.  It measured 3.9 cm x 8.3 cm x 5.9 cm it was felt that there was likely internal process of hemorrhage within the mass.  The mass demonstrated significant enhancement and it extended to the involved adjacent thickened areas of the dermis.  This mass was abutting the deltoid muscle and mildly effacing the deltoid muscle but not invading it.  There is also evidence of 13 mm short axis diameter right axillary lymph node which appeared to have a thickened cortex and was felt to be abnormal.  Dedicated ultrasound of the right axilla was recommended for further evaluation.  There were other smaller lymph nodes adjacent to the area.  Patient subsequently underwent right axillary lymph node ultrasound guided biopsy, the area contained multiple and large lymph nodes which were measuring between 3.5 and 3.2 cm.  There is at least 3 of them.  Pathology report showed lymph node tissue showing features consistent with nonspecific reactive lymphoid hyperplasia, there is no evidence of metastatic disease, nor a lymphoproliferative process or malignancy.     Patient was then referred to cancer center at [facility] in [facility], and was evaluated by her surgical oncologist Dr. Fabián Dawn.  Per review of his  consultation report, the patient had stated he had noted a new (mole) on the right arm 9 months prior to his original presentation and had enlarged over several months and began to bleed.  It was not painful and was not causing any neuromuscular issues.  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  We did discuss causes the results of the MRI above.  I was discussed for the patient to proceed with a radical resection of the right upper arm mass with placement of the wound VAC after performing the wide excision of the mass.  Surgical resection was performed on 5/26/2021 with en bloc resection of 12 cm with partial resection of the underlying right deltoid muscle.  Final wound measured 15 cm in diameter by 4 cm deep, and a wound VAC in place.  The pathology report showed that this was consistent with a malignant melanoma that measured 10.5 cm and was focally in the epidermis abutting ulcer with minute focus of possible melanoma in situ.  BRAF V600E was positive.  As the tumor staging summary, the histologic type was not determined.  The Breslow depth could not be determined.  There was ulceration present.  The mitotic rate was greater than 1/millimeter square.  Lymphovascular invasion was present.  Tumor infiltrating lymphocytes were present but nonbrisk.  There is no tumor regression noted.  The peripheral margins, and the deep margins were negative for invasive melanoma.  Tumor was 15 mm from the closest peripheral margin and 10 mm from the closest deep margin.  Plastic surgery was consulted for wound closure with skin grafting.  Patient underwent skin grafting on 6/8/2021.  Donor site was from the right thigh.     Patient was then seeking oncologic care in [facility] in [facility].  The records from that organization are not available on Care  Everywhere.  The patient states that he was receiving treatment with the local oncologist, Dr. Arce, with about 8 pills a day he was taking twice a day.  He states that he would pick them up at his doctor's office.  He states that he had had a PET scan which is showed positive lymph nodes in his right axilla.  On records available in care everywhere on 3/3/2022, there was a CT scan of the chest, abdomen and pelvis with contrast, which was compared to a PET/CT from September 16, 2021.  This states that on the prior PET/CT from September 2021 there had been enlarged right axillary lymphadenopathy that had measured 2.6 x 2.1 cm, and on the current study from March 3, 2022 it had resolved.  There was no other evidence seen of metastatic disease.  There was a 1 cm flash enhancing lesion in the right anterior lobe of the liver which was felt to be a hemangioma.     Then on July 25, 2022, it was noted that the patient had iron deficiency anemia.  At that time, on the note from the infusion center on 9/8/2022, at which time the patient was receiving Venofer infusions, the only medication listed on his med list was tramadol.  He did receive infusion of 300 mg of Venofer x6 doses as an outpatient.  Last dose was given on 10/27/2022.     On November 7, 2022, the patient received a blood transfusion for hemoglobin of 6.2, transfusion occurred on 11/8/2022.  Patient was then admitted on 12/21/2023 and discharged on 12/23/2023 to [facility], due to secondary acute anemia from blood loss secondary to GI bleed.  At that time he had presented with several weeks of dizziness, weakness, and left lower extremity swelling.  His oncologist recommended he go to the ED for evaluation.  He did a left lower extremity Doppler which was negative for DVT.  Hemoglobin at the time was 3.2.  Patient had complained of several weeks of painless red-maroon or black-colored stools which were not often.  He denied any abdominal  pain.  He denied any nausea or vomiting.  Patient received a total of 6 units of packed red cells and underwent an EGD which revealed 5 gastric ulcers with no active bleeding.  He was placed on 3 months of PPI therapy twice daily and was recommended for repeat EGD in 2 months.  At that time, the only medication listed was metformin.   Patient was then readmitted on 1/4/2023 and discharged on 1/6/2023, due to a recurrent GI bleed.  At that time, he had reported an abrupt onset of a sensation of fatigue, diaphoresis and dizziness, and then started having dark stools.  When he presented to the ED, with hypotension and hemoglobin of 8.1.  There was concern for recurrent GI bleed, however there is no evidence of overt GI bleed.  He was transfused 2 units of packed red cells, with hemoglobin up to 9.3 on day of discharge.  It was not recommended to repeat endoscopy at the time.  He was recommended to continue with the pantoprazole twice daily.  He had also had a tagged red cell scan during the first admission which was negative.       Patient presented back to his oncologist on 01/11/2023, with a hemoglobin of 6.5.  On 1/12/2023 he was then transfused 2 units of packed red cells at the outpatient infusion center at [facility].  Subsequent hemoglobin on 1/19/2023 was 7.1.  She did have repeat iron studies on 1/23/2023, with a ferritin of 12, percent iron saturation of 4%, LDH elevated to 364, with a total bilirubin of 0.4.  He again was managed with Venofer 300 mg infusions weekly x9 weeks.  With last dose administered on 5/4/2023.     Patient then had PET/CT on 2/02/2023, which at that time showed 2 thickened hypermetabolic small bowel loops with SUV max of 11.07 and 13.72.  They have felt that these findings were suspicious for an intussusception and a CT scan of the abdomen pelvis was recommended with and without contrast.  They still noted that there was no right axillary lymphadenopathy.     Patient was then  readmitted on 5/17/2023 through 5/18/2023, again due to acute on chronic blood loss anemia and received 2 units of packed red cells.  It was suspected to be an upper GI bleed and was referred for outpatient capsule endoscopy.  At the time of presentation on 5/17/2023 he had offered a 4-day history of black stools and dizziness in the morning.  When he presented to the ED his hemoglobin was 5.7.  After transfusion of 2 units of packed red cells his hemoglobin was up to 7.1.  Last hemoglobin on file as outpatient on 5/25/2023 was 8.4 with ferritin of 11.     Presented to [facility] ER on 6/28/23 with c/o weakness, epigastric pain and melanotic stools.  Hgb 4.6.  PMH c/w DM, gastric ulcers with h/o GI bleed s/p IV iron.  He underwent an EGD which did not show any evidence of upper GI bleeding. There were 2 small superficial ulcers/erosions in the duodenal bulb which were not felt to be the cause of the profound anemia with hemoglobin of less than 5 on admission. Patient was recommended to have a CT enterography due to PET/CT on 2/2/2023 with 2 thickened hypermetabolic masses and small bowel which were felt to be secondary to intussusception. Capsule endoscopy was to be considered pending results. CT enterography was performed on 6/29/2023, which showed an 8.7 cm area of masslike thickening involving a loop of small bowel within the right lower quadrant. There was an additional 7 cm area of masslike thickening involving a loop of small bowel within the left lower quadrant. These findings were felt to be suspicious of metastatic disease to small bowel given best history of melanoma.     S/p en bloc small bowel resection and partial omentectomy ([facility]) on 6/30/23 - two separate foci of metastatic melanoma, 7.5 cm and 6.5 cm, BRAF mutant.      S/p cycle 4 Ipilimumab with 3 mg/Nivolumab 1 mg.  Tolerated very well.    Currently s/p cycle 22 single agent nivolumab now at 480 mg every 4 weeks. Tolerating well.     Fatigue:   No    Fevers:  No    Appetite/taste changes:  No    Mucositis:  No    Weight changes:  No    Nausea/vomiting:  No    Diarrhea:  No    Constipation:  No    Peripheral neuropathy:  No    He saw his PCP recently and was checked for DM.  Told that had 1 month for diet and if not improved would start on meds for DM.     Lives in [place], [place].    Feeling well today, no complaints.     ECOG PS 0    Review of Systems:   Review of Systems   Eyes:  Negative for eye problems.   Respiratory:  Negative for cough and shortness of breath.    Cardiovascular:  Negative for chest pain, leg swelling and palpitations.   Gastrointestinal:  Negative for abdominal pain and blood in stool.   Endocrine:        No heat or cold intolerance   Genitourinary:  Negative for difficulty urinating, dysuria, frequency and hematuria.    Musculoskeletal:  Negative for arthralgias, back pain and neck pain.   Skin:  Negative for itching and rash.   Neurological:  Negative for dizziness, extremity weakness and headaches.   Hematological:  Negative for adenopathy. Does not bruise/bleed easily.   Psychiatric/Behavioral:  Negative for sleep disturbance.          No current outpatient medications on file.     Allergies:   No Known Allergies    Past Medical History:    Anemia    with multiple blood transfusion    GIB (gastrointestinal bleeding)    Melanoma (HCC)    right shoulder s/p surgical excision and skin graft, adjuvant BRAF/MEK inhibitors for 1.5 yrs    Personal history of antineoplastic chemotherapy     Past Surgical History:   Procedure Laterality Date    Other surgical history      small intestine surgery    Upper gi endoscopy,exam  12/01/2022     Social History     Socioeconomic History    Marital status: Single   Tobacco Use    Smoking status: Never    Smokeless tobacco: Never   Vaping Use    Vaping status: Never Used   Substance and Sexual Activity    Alcohol use: Not Currently    Drug use: Never       No family history on file.      PHYSICAL EXAM:     /72 (BP Location: Right arm, Patient Position: Sitting, Cuff Size: large)   Pulse 58   Temp 97.6 °F (36.4 °C) (Oral)   Resp 18   Ht 1.702 m (5' 7\")   Wt 106.1 kg (234 lb)   SpO2 99%   BMI 36.65 kg/m²   Wt Readings from Last 6 Encounters:   09/12/24 106.1 kg (234 lb)   08/15/24 103.9 kg (229 lb)   07/18/24 105.1 kg (231 lb 11.2 oz)   06/20/24 105.7 kg (233 lb)   05/23/24 106.8 kg (235 lb 6.4 oz)   05/09/24 106.1 kg (234 lb)     Physical Exam  General: Patient is alert, not in acute distress.    HEENT: EOMs intact. PERRL, Anicteric.  MMM.   Neck: No JVD. No palpable lymphadenopathy. Neck is supple, surgical scar.  Chest: Clear to auscultation.  Heart: Regular rate and rhythm.   Abdomen: Soft, non tender with good bowel sounds.  Surgical scars.  Extremities: No edema.  Neurological: Grossly intact.   Psych/Depression: nl        ASSESSMENT/PLAN:     1. Malignant melanoma of right upper extremity including shoulder (HCC)    2. 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 Encounter for antineoplastic immunotherapy        Malignant melanoma of right upper extremity including shoulder (HCC)  Proceed with treatment with nivolumab and ipilimumab x 4 cycles followed by maintenance nivolumab for total of 18 to 24 months.  Discussed goal of treatment is still potentially curative with these agents.      S/p cycle 4 Ipilimumab 3 mg/Nivolumab 1 mg.      Tumor assessment after the fourth cycle of treatment with marked response to therapy.  Tumor assessment after cycle 10 with very minimal activity in the LN in the abdomen.  Most recent restaging study was April 11, 2024 with stable mesenteric lymph node in the right mid abdomen with mild FDG activity compatible with stable treated disease.  Also patient had nonenlarged right paratracheal node measuring 7 mm which is mildly active which was likely reactive.  This will be followed on upcoming PET/CT 2024.    S/p cycle 22 maintenance nivolumab 480mg every 4  weeks.       Proceed with cycle 23.  Given that the patient lives far, and FDA approved to the 480 mg every 4 weeks nivolumab, will switch his completion of his treatment to this dosing and schedule given his distance to the treatment center.    Restaging PET/CT due in July of 2024. Denied by insurance CT scan ordered.  He does not show any evidence of recurrence.  There are some findings on the CT scan, which would be best evaluated for disease recurrence on PET/CT which is a superior imaging modality for evaluation of melanoma.  Patient will be due for imaging in 3 months which may October 2024 and this should be with a PET/CT. Will order after C 24     Hyperglycemia:  No drinking juices.  Monitor carbs and sugar intake.  Has follow up with PCP.     Goal is to complete a total of 18 months to 24 months of treatment from initiation of therapy.  24 months will be in August of 2025.    No orders of the defined types were placed in this encounter.    Call prn.  Follow-up in 4 weeks    MDM: high risk    Results From Past 48 Hours:  Recent Results (from the past 48 hour(s))   CBC W Differential W Platelet    Collection Time: 09/12/24  9:11 AM   Result Value Ref Range    WBC 6.1 4.0 - 11.0 x10(3) uL    RBC 4.68 4.30 - 5.70 x10(6)uL    HGB 15.4 13.0 - 17.5 g/dL    HCT 44.6 39.0 - 53.0 %    MCV 95.3 80.0 - 100.0 fL    MCH 32.9 26.0 - 34.0 pg    MCHC 34.5 31.0 - 37.0 g/dL    RDW-SD 43.9 35.1 - 46.3 fL    RDW 12.5 11.0 - 15.0 %    .0 150.0 - 450.0 10(3)uL    Immature Platelet Fraction 1.4 0.0 - 7.0 %    Neutrophil Absolute Prelim 3.22 1.50 - 7.70 x10 (3) uL    Neutrophil Absolute 3.22 1.50 - 7.70 x10(3) uL    Lymphocyte Absolute 2.06 1.00 - 4.00 x10(3) uL    Monocyte Absolute 0.61 0.10 - 1.00 x10(3) uL    Eosinophil Absolute 0.08 0.00 - 0.70 x10(3) uL    Basophil Absolute 0.08 0.00 - 0.20 x10(3) uL    Immature Granulocyte Absolute 0.04 0.00 - 1.00 x10(3) uL    Neutrophil % 52.9 %    Lymphocyte % 33.8 %    Monocyte %  10.0 %    Eosinophil % 1.3 %    Basophil % 1.3 %    Immature Granulocyte % 0.7 %   Comp Metabolic Panel (14)    Collection Time: 09/12/24  9:11 AM   Result Value Ref Range    Glucose 270 (H) 70 - 99 mg/dL    Sodium 136 136 - 145 mmol/L    Potassium      Chloride 104 98 - 112 mmol/L    CO2 24.0 21.0 - 32.0 mmol/L    Anion Gap 8 0 - 18 mmol/L    BUN      Creatinine 0.88 0.70 - 1.30 mg/dL    BUN/CREA Ratio      Calcium, Total 9.1 8.7 - 10.4 mg/dL    eGFR-Cr 99 >=60 mL/min/1.73m2    ALT 34 10 - 49 U/L    AST      Alkaline Phosphatase 83 45 - 117 U/L    Bilirubin, Total 0.5 0.3 - 1.2 mg/dL    Total Protein 7.3 5.7 - 8.2 g/dL    Albumin 4.3 3.2 - 4.8 g/dL    Globulin  3.0 2.0 - 3.5 g/dL    A/G Ratio 1.4 1.0 - 2.0    Patient Fasting for CMP? Patient not present    TSH W REFLEX TO FREE T4 [E]    Collection Time: 09/12/24  9:11 AM   Result Value Ref Range    TSH 2.684 0.550 - 4.780 mIU/mL   Scan slide    Collection Time: 09/12/24  9:11 AM   Result Value Ref Range    Slide Review       Platelets reviewed on smear. No giant platelets or platelet clumps observed.     PROCEDURE: CT CHEST ABDOMEN PELVIS (ALL CONTRAST ONLY) (CPT=71260/07292)     COMPARISON: [facility], CT ENTEROGRAPHY ABD PEL W CONTRAST SH (CPT-15178), 6/29/2023, 4:15 PM.  [facility], PET (NON-STAND) SCANS(SKULL TO TOES) (CPT=78816), 4/11/2024, 8:27 AM.  [facility], PET (NON-STAND) SCANS(SKULL TO TOES) (CPT=78816), 1/09/2024, 9:01 AM.     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  Automated exposure control for dose reduction was used. Adjustment of the mA and/or kV was done based on the patient's size. Use of iterative  reconstruction technique for dose reduction was used.  Dose information  is transmitted to the ACR (American College of Radiology) NRDR (National Radiology Data Registry) which includes the Dose Index Registry.     FINDINGS:     CARDIAC:   The heart is within normal limits of size.  No visualized coronary artery calcifications.  No pericardial effusion.     MEDIASTINUM/KIERSTEN:   No significant change in the 7 mm right paratracheal lymph node (2:39).  Multiple additional smaller mediastinal and hilar lymph nodes are also not significantly changed.     LUNGS:   Minimal bibasilar atelectasis/scarring.  There is scattered peribronchial thickening.  There is a subcentimeter calcified granuloma abutting the pleura in the right upper lobe (3:28).  Stable 3 mm pulmonary nodule involving the anterior aspect  of the right upper lobe (2:48).  Stable 2 mm pulmonary nodule in the lateral aspect of the right upper lobe (2:46).  Stable 2 mm pulmonary nodule along the right minor fissure (2:56).  This nodule is triangular shaped and likely reflects a intrapulmonary   lymph node.  No new or enlarging pulmonary nodule.     VASCULATURE:   The main pulmonary artery is normal in caliber.  No acute pulmonary embolism through the lobar pulmonary arteries.     THORACIC AORTA: No aneurysm or dissection.       PLEURA:   No mass or effusion.       CHEST WALL: No axillary mass or enlarged adenopathy.       LOWER CHEST: The lung bases are clear.     HEPATOBILIARY:   The liver is decreased in attenuation/enhancement when compared to the spleen, which may reflect hepatic steatosis or be secondary to phase of contrast.  There is a 5 mm region of hyperenhancement involving the periphery of the right  hepatic lobe (2:83, 5:38); no FDG-avidity was appreciated in this region on the prior PET-CT in the lesion may have been present on the prior CT enterography dated 06/29/2025.  No acute cholecystitis.  No biliary ductal dilatation.     SPLEEN:   No enlargement or focal lesion.       PANCREAS:   No lesion, fluid collection,  ductal dilatation, or atrophy.       ADRENALS:   No mass or enlargement.       GENITOURINARY:   No hydronephrosis or urinary calculus.  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  No enhancing renal mass.  There is a 3.0 cm right lower pole renal cyst (5:68).  The bladder is unremarkable.     GI TRACT:   There are postoperative changes from prior partial small bowel resection.  There is a short segment region of circumferential bowel wall thickening near the inferior small bowel-small bowel anastomosis (2:140).  No definitive FDG-avidity was   demonstrated in this region on the prior PET-CT.  No bowel obstruction.  The appendix is unremarkable.  No acute appendicitis.  Moderate colonic stool burden.     PELVIC ORGANS: The prostate is unchanged in size.     AORTA/VASCULAR:   No aneurysm or dissection.  Minimal atherosclerotic calcification of the abdominal aorta and its branching vessels.     RETROPERITONEUM:   No mass or enlarged adenopathy.       PERITONEUM: No pneumoperitoneum or ascites.     LYMPH NODES: No evidence of lymphadenopathy.  Stable 7 mm gastrohepatic ligament lymph node (2:97).  There is a 8 x 10 mm mesenteric lymph node, which previously measured 12 x 18 mm (2:133).  No new or enlarging mesenteric lymph node.  Multiple  additional subcentimeter mesenteric lymph nodes are not significantly changed when accounting for differences in technique.     BONES: No acute fracture. No aggressive osseous lesion.  Multilevel degenerative changes of the thoracic spine.  Multilevel degenerative changes of the lumbar spine, which are most advanced at L4-L5 and L5-S1.  Mild multifocal degenerative change  involving the osseous pelvis.     OTHER:   There is diastasis rectus with a few small fat containing ventral abdominal hernias.  There are bilateral fat containing inguinal hernias.                  Impression   CONCLUSION:     CT CHEST:     1. Stable 7 mm right paratracheal lymph node.  No new or enlarging  mediastinal lymph nodes.  2. Multiple stable micronodules in the right lung measuring up to 3 mm.  These findings are favored to be infectious/inflammatory.  3. No new or progressive metastatic disease in the chest.     CT ABDOMEN AND PELVIS:     1. Postoperative changes from prior partial small bowel resection with a short segment of mild circumferential bowel wall thickening near the inferior anastomosis site.  This finding is nonspecific, but favored to be secondary to an intussusception in  this region, although residual/recurrent disease is not entirely excluded.  Recommend continued attention on follow-up imaging.  2. A 5 mm hyperenhancing lesion involving the periphery of the right hepatic lobe, which is incompletely characterized but favored to reflect a perfusion variant or flash filling hemangioma.  No FDG-avidity was demonstrated in this region on the prior  PET-CT and the lesion may have been present on the prior CT enterography.    3. Mild decrease in size of the dominant mesenteric lymph node measuring 8 mm in short axis.  No new or enlarging abdominopelvic lymph node.  4. Lesser incidental findings described above.             Dictated by (CST): Fabián Chaudhari MD on 7/18/2024 at 12:19 PM      Finalized by (CST): Fabián Chaudhari MD on 7/18/2024 at 12:46 PM

## 2024-10-09 ENCOUNTER — TELEPHONE (OUTPATIENT)
Dept: HEMATOLOGY/ONCOLOGY | Facility: HOSPITAL | Age: 59
End: 2024-10-09

## 2024-10-09 NOTE — TELEPHONE ENCOUNTER
Called patients niece to inform her that we would need to reschedule patient appointments tomorrow due to insurance being rejected. Milka states she will call patients son and have him give a call back to reschedule appointment.

## 2024-10-11 ENCOUNTER — SOCIAL WORK SERVICES (OUTPATIENT)
Dept: HEMATOLOGY/ONCOLOGY | Facility: HOSPITAL | Age: 59
End: 2024-10-11

## 2024-10-11 NOTE — PROGRESS NOTES
SW called using  ID [id number] and spoke with patient about financial assistance. Patient has been denied medicaid due to being undocumented. SW explained that he will need to fill out the financial assistance application provided to him back in May. Patient confirmed that he does still have the forms. SW explained that he will need to also provide a letter from medicaid stating that he has been denied. Patient was prompted to drop off his financial assistance application to the [facility]

## 2024-10-14 ENCOUNTER — SOCIAL WORK SERVICES (OUTPATIENT)
Dept: HEMATOLOGY/ONCOLOGY | Facility: HOSPITAL | Age: 59
End: 2024-10-14

## 2024-10-14 NOTE — PROGRESS NOTES
SIOBHAN met with patient in person today in consult room 2 to assist him with the [facility] Financial Assistance application. SW used the  service  and was assigned Wayne  ID # [id number]. SIOBHAN explained that since patient is living with a family member(niece), he would need to have her complete the room and board statement/confirmation of support letter. Patient will have his niece fill out this portion of the application and return to SIOBHAN as soon as possible. SIOBHAN will submit the entire application to financial assistance once all required paperwork is completed.    SIOBHAN received back the signed room and board statement/ confirmation of support letter. SIOBHAN forwarded patient's application to the financial assistance department to [email or web address].

## 2024-10-16 ENCOUNTER — TELEPHONE (OUTPATIENT)
Dept: HEMATOLOGY/ONCOLOGY | Facility: HOSPITAL | Age: 59
End: 2024-10-16

## 2024-10-17 ENCOUNTER — HOSPITAL ENCOUNTER (OUTPATIENT)
Dept: NUCLEAR MEDICINE | Facility: HOSPITAL | Age: 59
Discharge: HOME | End: 2024-10-17
Attending: INTERNAL MEDICINE

## 2024-10-17 ENCOUNTER — APPOINTMENT (OUTPATIENT)
Dept: NUCLEAR MEDICINE | Facility: HOSPITAL | Age: 59
End: 2024-10-17
Attending: INTERNAL MEDICINE

## 2024-10-17 DIAGNOSIS — Z51.12 ENCOUNTER FOR ANTINEOPLASTIC IMMUNOTHERAPY: ICD-10-CM

## 2024-10-17 DIAGNOSIS — C78.89 MELANOMA METASTATIC TO DIGESTIVE ORGAN (HCC): ICD-10-CM

## 2024-10-17 DIAGNOSIS — C43.9 MALIGNANT MELANOMA, UNSPECIFIED SITE (HCC): ICD-10-CM

## 2024-10-17 LAB — GLUCOSE BLDC GLUCOMTR-MCNC: 261 MG/DL (ref 70–99)

## 2024-10-17 PROCEDURE — 82962 GLUCOSE BLOOD TEST: CPT

## 2024-10-24 ENCOUNTER — HOSPITAL ENCOUNTER (OUTPATIENT)
Dept: NUCLEAR MEDICINE | Facility: HOSPITAL | Age: 59
Discharge: HOME | End: 2024-10-24
Attending: INTERNAL MEDICINE

## 2024-10-24 LAB — GLUCOSE BLDC GLUCOMTR-MCNC: 156 MG/DL (ref 70–99)

## 2024-10-24 PROCEDURE — 82962 GLUCOSE BLOOD TEST: CPT

## 2024-10-24 PROCEDURE — 78816 PET IMAGE W/CT FULL BODY: CPT | Performed by: INTERNAL MEDICINE

## 2024-11-07 ENCOUNTER — NURSE ONLY (OUTPATIENT)
Dept: HEMATOLOGY/ONCOLOGY | Facility: HOSPITAL | Age: 59
End: 2024-11-07
Attending: INTERNAL MEDICINE

## 2024-11-07 VITALS
WEIGHT: 231 LBS | BODY MASS INDEX: 36.26 KG/M2 | HEIGHT: 67 IN | OXYGEN SATURATION: 99 % | TEMPERATURE: 98 F | HEART RATE: 58 BPM | SYSTOLIC BLOOD PRESSURE: 151 MMHG | RESPIRATION RATE: 18 BRPM | DIASTOLIC BLOOD PRESSURE: 78 MMHG

## 2024-11-07 DIAGNOSIS — C43.61 MALIGNANT MELANOMA OF RIGHT UPPER EXTREMITY INCLUDING SHOULDER (HCC): ICD-10-CM

## 2024-11-07 DIAGNOSIS — Z51.12 ENCOUNTER FOR ANTINEOPLASTIC IMMUNOTHERAPY: Primary | ICD-10-CM

## 2024-11-07 DIAGNOSIS — C43.61 MALIGNANT MELANOMA OF RIGHT UPPER EXTREMITY INCLUDING SHOULDER (HCC): Primary | ICD-10-CM

## 2024-11-07 DIAGNOSIS — Z51.12 ENCOUNTER FOR ANTINEOPLASTIC IMMUNOTHERAPY: ICD-10-CM

## 2024-11-07 DIAGNOSIS — C78.89 MELANOMA METASTATIC TO DIGESTIVE ORGAN (HCC): ICD-10-CM

## 2024-11-07 LAB
ALBUMIN SERPL-MCNC: 4.2 G/DL (ref 3.2–4.8)
ALBUMIN/GLOB SERPL: 1.4 {RATIO} (ref 1–2)
ALP LIVER SERPL-CCNC: 93 U/L
ALT SERPL-CCNC: 27 U/L
ANION GAP SERPL CALC-SCNC: 7 MMOL/L (ref 0–18)
AST SERPL-CCNC: 17 U/L (ref ?–34)
BASOPHILS # BLD AUTO: 0.06 X10(3) UL (ref 0–0.2)
BASOPHILS NFR BLD AUTO: 1.1 %
BILIRUB SERPL-MCNC: 0.4 MG/DL (ref 0.3–1.2)
BUN BLD-MCNC: 18 MG/DL (ref 9–23)
BUN/CREAT SERPL: 20.7 (ref 10–20)
CALCIUM BLD-MCNC: 9.3 MG/DL (ref 8.7–10.4)
CHLORIDE SERPL-SCNC: 106 MMOL/L (ref 98–112)
CO2 SERPL-SCNC: 26 MMOL/L (ref 21–32)
CREAT BLD-MCNC: 0.87 MG/DL
DEPRECATED RDW RBC AUTO: 42.7 FL (ref 35.1–46.3)
EGFRCR SERPLBLD CKD-EPI 2021: 99 ML/MIN/1.73M2 (ref 60–?)
EOSINOPHIL # BLD AUTO: 0.08 X10(3) UL (ref 0–0.7)
EOSINOPHIL NFR BLD AUTO: 1.4 %
ERYTHROCYTE [DISTWIDTH] IN BLOOD BY AUTOMATED COUNT: 12.3 % (ref 11–15)
FASTING STATUS PATIENT QL REPORTED: NO
GLOBULIN PLAS-MCNC: 3 G/DL (ref 2–3.5)
GLUCOSE BLD-MCNC: 219 MG/DL (ref 70–99)
HCT VFR BLD AUTO: 44.6 %
HGB BLD-MCNC: 15.3 G/DL
IMM GRANULOCYTES # BLD AUTO: 0.03 X10(3) UL (ref 0–1)
IMM GRANULOCYTES NFR BLD: 0.5 %
LYMPHOCYTES # BLD AUTO: 1.82 X10(3) UL (ref 1–4)
LYMPHOCYTES NFR BLD AUTO: 32.6 %
MCH RBC QN AUTO: 32.5 PG (ref 26–34)
MCHC RBC AUTO-ENTMCNC: 34.3 G/DL (ref 31–37)
MCV RBC AUTO: 94.7 FL
MONOCYTES # BLD AUTO: 0.56 X10(3) UL (ref 0.1–1)
MONOCYTES NFR BLD AUTO: 10 %
NEUTROPHILS # BLD AUTO: 3.04 X10 (3) UL (ref 1.5–7.7)
NEUTROPHILS # BLD AUTO: 3.04 X10(3) UL (ref 1.5–7.7)
NEUTROPHILS NFR BLD AUTO: 54.4 %
OSMOLALITY SERPL CALC.SUM OF ELEC: 297 MOSM/KG (ref 275–295)
PLATELET # BLD AUTO: 243 10(3)UL (ref 150–450)
POTASSIUM SERPL-SCNC: 4.3 MMOL/L (ref 3.5–5.1)
PROT SERPL-MCNC: 7.2 G/DL (ref 5.7–8.2)
RBC # BLD AUTO: 4.71 X10(6)UL
SODIUM SERPL-SCNC: 139 MMOL/L (ref 136–145)
TSI SER-ACNC: 2.82 UIU/ML (ref 0.55–4.78)
WBC # BLD AUTO: 5.6 X10(3) UL (ref 4–11)

## 2024-11-07 PROCEDURE — 80053 COMPREHEN METABOLIC PANEL: CPT

## 2024-11-07 PROCEDURE — 96413 CHEMO IV INFUSION 1 HR: CPT

## 2024-11-07 PROCEDURE — 84443 ASSAY THYROID STIM HORMONE: CPT

## 2024-11-07 PROCEDURE — 85025 COMPLETE CBC W/AUTO DIFF WBC: CPT

## 2024-11-07 PROCEDURE — 99215 OFFICE O/P EST HI 40 MIN: CPT | Performed by: INTERNAL MEDICINE

## 2024-11-07 NOTE — PROGRESS NOTES
Pt here for C24 D1 Opdivo.  Arrives Ambulating independently, accompanied by Self     Patient was evaluated today by MD.    Oral medications included in this regimen:  no    Patient confirms comprehension of cancer treatment schedule:  yes    Pregnancy screening:  Not applicable    Modifications in dose or schedule:  No    Medications appearance and physical integrity checked by RN: yes.    Chemotherapy IV pump settings verified by 2 RNs: N/A; Opdivo does not require dual sign off.   Frequency of blood return and site check throughout administration: Prior to administration, Prior to each drug, and At completion of therapy     Infusion/treatment outcome:  patient tolerated treatment without incident    Education Record    Learner:  Patient  Barriers / Limitations:  Language  Method:  Discussion and Reinforcement  Education / instructions given:  Plan of care reviewed and discussed.  Outcome:  Shows understanding    Discharged Home, Ambulating independently, accompanied by:Self    Patient/family verbalized understanding of future appointments: by printed AVS

## 2024-11-07 NOTE — PROGRESS NOTES
HPI   Cole Kendrick is a 59 year old male here for follow up of   Encounter Diagnoses   Name Primary?    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    Oncology history:   Extensive outside records from multiple facilities since March 2021 were reviewed.  This is a synopsis of those records, details can be found on Care Everywhere.  Patient had presented at [facility] in [facility] March 2021 due to a mass of the right shoulder.  At that time he underwent imaging with an MRI which showed a superficial right upper arm mass which appeared to be veins within the dermis.  It measured 3.9 cm x 8.3 cm x 5.9 cm it was felt that there was likely internal process of hemorrhage within the mass.  The mass demonstrated significant enhancement and it extended to the involved adjacent thickened areas of the dermis.  This mass was abutting the deltoid muscle and mildly effacing the deltoid muscle but not invading it.  There is also evidence of 13 mm short axis diameter right axillary lymph node which appeared to have a thickened cortex and was felt to be abnormal.  Dedicated ultrasound of the right axilla was recommended for further evaluation.  There were other smaller lymph nodes adjacent to the area.  Patient subsequently underwent right axillary lymph node ultrasound guided biopsy, the area contained multiple and large lymph nodes which were measuring between 3.5 and 3.2 cm.  There is at least 3 of them.  Pathology report showed lymph node tissue showing features consistent with nonspecific reactive lymphoid hyperplasia, there is no evidence of metastatic disease, nor a lymphoproliferative process or malignancy.     Patient was then referred to cancer center at [facility] in [facility], and was evaluated by her surgical oncologist Dr. Fabián Dawn.  Per review of his  consultation report, the patient had stated he had noted a new (mole) on the right arm 9 months prior to his original presentation and had enlarged over several months and began to bleed.  It was not painful and was not causing any neuromuscular issues.  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  We did discuss causes the results of the MRI above.  I was discussed for the patient to proceed with a radical resection of the right upper arm mass with placement of the wound VAC after performing the wide excision of the mass.  Surgical resection was performed on 5/26/2021 with en bloc resection of 12 cm with partial resection of the underlying right deltoid muscle.  Final wound measured 15 cm in diameter by 4 cm deep, and a wound VAC in place.  The pathology report showed that this was consistent with a malignant melanoma that measured 10.5 cm and was focally in the epidermis abutting ulcer with minute focus of possible melanoma in situ.  BRAF V600E was positive.  As the tumor staging summary, the histologic type was not determined.  The Breslow depth could not be determined.  There was ulceration present.  The mitotic rate was greater than 1/millimeter square.  Lymphovascular invasion was present.  Tumor infiltrating lymphocytes were present but nonbrisk.  There is no tumor regression noted.  The peripheral margins, and the deep margins were negative for invasive melanoma.  Tumor was 15 mm from the closest peripheral margin and 10 mm from the closest deep margin.  Plastic surgery was consulted for wound closure with skin grafting.  Patient underwent skin grafting on 6/8/2021.  Donor site was from the right thigh.     Patient was then seeking oncologic care in [facility] in [facility].  The records from that organization are not available on Care  Everywhere.  The patient states that he was receiving treatment with the local oncologist, Dr. Arce, with about 8 pills a day he was taking twice a day.  He states that he would pick them up at his doctor's office.  He states that he had had a PET scan which is showed positive lymph nodes in his right axilla.  On records available in care everywhere on 3/3/2022, there was a CT scan of the chest, abdomen and pelvis with contrast, which was compared to a PET/CT from September 16, 2021.  This states that on the prior PET/CT from September 2021 there had been enlarged right axillary lymphadenopathy that had measured 2.6 x 2.1 cm, and on the current study from March 3, 2022 it had resolved.  There was no other evidence seen of metastatic disease.  There was a 1 cm flash enhancing lesion in the right anterior lobe of the liver which was felt to be a hemangioma.     Then on July 25, 2022, it was noted that the patient had iron deficiency anemia.  At that time, on the note from the infusion center on 9/8/2022, at which time the patient was receiving Venofer infusions, the only medication listed on his med list was tramadol.  He did receive infusion of 300 mg of Venofer x6 doses as an outpatient.  Last dose was given on 10/27/2022.     On November 7, 2022, the patient received a blood transfusion for hemoglobin of 6.2, transfusion occurred on 11/8/2022.  Patient was then admitted on 12/21/2023 and discharged on 12/23/2023 to [facility], due to secondary acute anemia from blood loss secondary to GI bleed.  At that time he had presented with several weeks of dizziness, weakness, and left lower extremity swelling.  His oncologist recommended he go to the ED for evaluation.  He did a left lower extremity Doppler which was negative for DVT.  Hemoglobin at the time was 3.2.  Patient had complained of several weeks of painless red-maroon or black-colored stools which were not often.  He denied any abdominal  pain.  He denied any nausea or vomiting.  Patient received a total of 6 units of packed red cells and underwent an EGD which revealed 5 gastric ulcers with no active bleeding.  He was placed on 3 months of PPI therapy twice daily and was recommended for repeat EGD in 2 months.  At that time, the only medication listed was metformin.   Patient was then readmitted on 1/4/2023 and discharged on 1/6/2023, due to a recurrent GI bleed.  At that time, he had reported an abrupt onset of a sensation of fatigue, diaphoresis and dizziness, and then started having dark stools.  When he presented to the ED, with hypotension and hemoglobin of 8.1.  There was concern for recurrent GI bleed, however there is no evidence of overt GI bleed.  He was transfused 2 units of packed red cells, with hemoglobin up to 9.3 on day of discharge.  It was not recommended to repeat endoscopy at the time.  He was recommended to continue with the pantoprazole twice daily.  He had also had a tagged red cell scan during the first admission which was negative.       Patient presented back to his oncologist on 01/11/2023, with a hemoglobin of 6.5.  On 1/12/2023 he was then transfused 2 units of packed red cells at the outpatient infusion center at [facility].  Subsequent hemoglobin on 1/19/2023 was 7.1.  She did have repeat iron studies on 1/23/2023, with a ferritin of 12, percent iron saturation of 4%, LDH elevated to 364, with a total bilirubin of 0.4.  He again was managed with Venofer 300 mg infusions weekly x9 weeks.  With last dose administered on 5/4/2023.     Patient then had PET/CT on 2/02/2023, which at that time showed 2 thickened hypermetabolic small bowel loops with SUV max of 11.07 and 13.72.  They have felt that these findings were suspicious for an intussusception and a CT scan of the abdomen pelvis was recommended with and without contrast.  They still noted that there was no right axillary lymphadenopathy.     Patient was then  readmitted on 5/17/2023 through 5/18/2023, again due to acute on chronic blood loss anemia and received 2 units of packed red cells.  It was suspected to be an upper GI bleed and was referred for outpatient capsule endoscopy.  At the time of presentation on 5/17/2023 he had offered a 4-day history of black stools and dizziness in the morning.  When he presented to the ED his hemoglobin was 5.7.  After transfusion of 2 units of packed red cells his hemoglobin was up to 7.1.  Last hemoglobin on file as outpatient on 5/25/2023 was 8.4 with ferritin of 11.     Presented to [facility] ER on 6/28/23 with c/o weakness, epigastric pain and melanotic stools.  Hgb 4.6.  PMH c/w DM, gastric ulcers with h/o GI bleed s/p IV iron.  He underwent an EGD which did not show any evidence of upper GI bleeding. There were 2 small superficial ulcers/erosions in the duodenal bulb which were not felt to be the cause of the profound anemia with hemoglobin of less than 5 on admission. Patient was recommended to have a CT enterography due to PET/CT on 2/2/2023 with 2 thickened hypermetabolic masses and small bowel which were felt to be secondary to intussusception. Capsule endoscopy was to be considered pending results. CT enterography was performed on 6/29/2023, which showed an 8.7 cm area of masslike thickening involving a loop of small bowel within the right lower quadrant. There was an additional 7 cm area of masslike thickening involving a loop of small bowel within the left lower quadrant. These findings were felt to be suspicious of metastatic disease to small bowel given best history of melanoma.     S/p en bloc small bowel resection and partial omentectomy ([facility]) on 6/30/23 - two separate foci of metastatic melanoma, 7.5 cm and 6.5 cm, BRAF mutant.      S/p cycle 4 Ipilimumab with 3 mg/Nivolumab 1 mg.  Tolerated very well.    Currently s/p cycle 23 single agent nivolumab now at 480 mg every 4 weeks. Tolerating well.     Fatigue:   No    Fevers:  No    Appetite/taste changes:  No    Mucositis:  No    Weight changes:  No    Nausea/vomiting:  No    Diarrhea:  No    Constipation:  No    Peripheral neuropathy:  No    Follows with his PCP.  Was not started on DM meds.  Monitoring BP.    Lives in [place], [place].    Feeling well today, no complaints.     ECOG PS 0    Review of Systems:   Review of Systems   Eyes:  Negative for eye problems.   Respiratory:  Negative for cough and shortness of breath.    Cardiovascular:  Negative for chest pain, leg swelling and palpitations.   Gastrointestinal:  Positive for abdominal pain (At the RLQ for the past month, feels like pins and numb.  Does not last long.). Negative for blood in stool.        Feels the right abdomen is larger than the Left.    Endocrine:        No heat or cold intolerance   Genitourinary:  Negative for difficulty urinating, dysuria, frequency and hematuria.    Musculoskeletal:  Negative for arthralgias, back pain and neck pain.   Skin:  Negative for itching and rash.   Neurological:  Negative for dizziness, extremity weakness and headaches.   Hematological:  Negative for adenopathy. Does not bruise/bleed easily.   Psychiatric/Behavioral:  Negative for sleep disturbance.          No current outpatient medications on file.     Allergies:   No Known Allergies    Past Medical History:    Anemia    with multiple blood transfusion    GIB (gastrointestinal bleeding)    Melanoma (HCC)    right shoulder s/p surgical excision and skin graft, adjuvant BRAF/MEK inhibitors for 1.5 yrs    Personal history of antineoplastic chemotherapy     Past Surgical History:   Procedure Laterality Date    Other surgical history      small intestine surgery    Upper gi endoscopy,exam  12/01/2022     Social History     Socioeconomic History    Marital status: Single   Tobacco Use    Smoking status: Never    Smokeless tobacco: Never   Vaping Use    Vaping status: Never Used   Substance and Sexual Activity    Alcohol use:  Not Currently    Drug use: Never       History reviewed. No pertinent family history.      PHYSICAL EXAM:    /78 (BP Location: Left arm, Patient Position: Sitting, Cuff Size: large)   Pulse 58   Temp 97.9 °F (36.6 °C) (Oral)   Resp 18   Ht 1.702 m (5' 7\")   Wt 104.8 kg (231 lb)   SpO2 99%   BMI 36.18 kg/m²   Wt Readings from Last 6 Encounters:   11/07/24 104.8 kg (231 lb)   09/12/24 106.1 kg (234 lb)   08/15/24 103.9 kg (229 lb)   07/18/24 105.1 kg (231 lb 11.2 oz)   06/20/24 105.7 kg (233 lb)   05/23/24 106.8 kg (235 lb 6.4 oz)     Physical Exam  General: Patient is alert, not in acute distress.    HEENT: EOMs intact. PERRL, Anicteric.  MMM.   Neck: No JVD. No palpable lymphadenopathy. Neck is supple, surgical scar.  Chest: Clear to auscultation.  Heart: Regular rate and rhythm.   Abdomen: Soft, non tender with good bowel sounds.  Surgical scars.  Extremities: No edema.  Neurological: Grossly intact.   Psych/Depression: nl        ASSESSMENT/PLAN:     1. Malignant melanoma of right upper extremity including shoulder (HCC)    2. 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 Encounter for antineoplastic immunotherapy        Malignant melanoma of right upper extremity including shoulder (HCC)  Proceed with treatment with nivolumab and ipilimumab x 4 cycles followed by maintenance nivolumab for total of 18 to 24 months.  Discussed goal of treatment is still potentially curative with these agents.      S/p cycle 4 Ipilimumab 3 mg/Nivolumab 1 mg.      Tumor assessment after the fourth cycle of treatment with marked response to therapy.  Tumor assessment after cycle 10 with very minimal activity in the LN in the abdomen.  Most recent restaging study was April 11, 2024 with stable mesenteric lymph node in the right mid abdomen with mild FDG activity compatible with stable treated disease.  Also patient had nonenlarged right paratracheal node measuring 7 mm which is mildly active which was likely reactive.   This will be followed on upcoming PET/CT 2024.    S/p cycle 23 maintenance nivolumab 480mg every 4 weeks.       Proceed with cycle 24.  Given that the patient lives far, and FDA approved to the 480 mg every 4 weeks nivolumab, will switch his completion of his treatment to this dosing and schedule given his distance to the treatment center.    Restaging PET/CT on 10/24/2024 with no evidence of disease.  He has a borderline right paratracheal lymphadenopathy which is mildly hypermetabolic and is unchanged.  Will proceed with repeat imaging in 4 months which will be in February 2024.    HTN; discussed with patient.  Had caffeine prior to visit.  He will follow with PCP if further issues..    Goal is to complete a total of 18 months to 24 months of treatment from initiation of therapy.  24 months will be in August of 2025.    No orders of the defined types were placed in this encounter.    Call prn.  Follow-up in 4 weeks    MDM: high risk    Results From Past 48 Hours:  Recent Results (from the past 48 hours)   CBC W Differential W Platelet    Collection Time: 11/07/24  7:23 AM   Result Value Ref Range    WBC 5.6 4.0 - 11.0 x10(3) uL    RBC 4.71 4.30 - 5.70 x10(6)uL    HGB 15.3 13.0 - 17.5 g/dL    HCT 44.6 39.0 - 53.0 %    MCV 94.7 80.0 - 100.0 fL    MCH 32.5 26.0 - 34.0 pg    MCHC 34.3 31.0 - 37.0 g/dL    RDW-SD 42.7 35.1 - 46.3 fL    RDW 12.3 11.0 - 15.0 %    .0 150.0 - 450.0 10(3)uL    Neutrophil Absolute Prelim 3.04 1.50 - 7.70 x10 (3) uL    Neutrophil Absolute 3.04 1.50 - 7.70 x10(3) uL    Lymphocyte Absolute 1.82 1.00 - 4.00 x10(3) uL    Monocyte Absolute 0.56 0.10 - 1.00 x10(3) uL    Eosinophil Absolute 0.08 0.00 - 0.70 x10(3) uL    Basophil Absolute 0.06 0.00 - 0.20 x10(3) uL    Immature Granulocyte Absolute 0.03 0.00 - 1.00 x10(3) uL    Neutrophil % 54.4 %    Lymphocyte % 32.6 %    Monocyte % 10.0 %    Eosinophil % 1.4 %    Basophil % 1.1 %    Immature Granulocyte % 0.5 %   Comp Metabolic Panel (14)     Collection Time: 11/07/24  7:23 AM   Result Value Ref Range    Glucose 219 (H) 70 - 99 mg/dL    Sodium 139 136 - 145 mmol/L    Potassium 4.3 3.5 - 5.1 mmol/L    Chloride 106 98 - 112 mmol/L    CO2 26.0 21.0 - 32.0 mmol/L    Anion Gap 7 0 - 18 mmol/L    BUN 18 9 - 23 mg/dL    Creatinine 0.87 0.70 - 1.30 mg/dL    BUN/CREA Ratio 20.7 (H) 10.0 - 20.0    Calcium, Total 9.3 8.7 - 10.4 mg/dL    Calculated Osmolality 297 (H) 275 - 295 mOsm/kg    eGFR-Cr 99 >=60 mL/min/1.73m2    ALT 27 10 - 49 U/L    AST 17 <34 U/L    Alkaline Phosphatase 93 45 - 117 U/L    Bilirubin, Total 0.4 0.3 - 1.2 mg/dL    Total Protein 7.2 5.7 - 8.2 g/dL    Albumin 4.2 3.2 - 4.8 g/dL    Globulin  3.0 2.0 - 3.5 g/dL    A/G Ratio 1.4 1.0 - 2.0    Patient Fasting for CMP? No    TSH W REFLEX TO FREE T4 [E]    Collection Time: 11/07/24  7:23 AM   Result Value Ref Range    TSH 2.818 0.550 - 4.780 uIU/mL     PROCEDURE: PET/CT (NON-STAND) SCANS(SKULL TO TOES) (CPT=78816)     COMPARISON: CT ENTEROGRAPHY ABD PEL W CONTRAST SH (CPT-26807), 6/29/2023, 4:15 PM.  [facility], PET (NON-STAND) SCANS(SKULL TO TOES) (CPT=78816), 8/01/2023, 7:28 AM.  CT SOFT TISSUE OF NECK (CONTRAST ONLY) (CPT=70491), 8/30/2023,   6:15 PM.  [facility], PET (NON-STAND) SCANS(SKULL TO TOES) (CPT=78816), 1/09/2024, 9:01 AM.  PET (NON-STAND) SCANS(SKULL TO TOES) (CPT=78816), 10/18/2023, 7:29 AM.  [facility], CT CHEST+ABDOMEN+PELVIS(ALL CNTRST  ONLY)(CPT=71260/35717), 7/18/2024, 8:17 AM.  [facility], PET (NON-STAND) SCANS(SKULL TO TOES) (CPT=78816), 4/11/2024, 8:27 AM.     INDICATIONS: Subsequent Treatment Strategy: 59-year-old male with history of malignant melanoma of the right shoulder, complicated by metastatic melanoma involving 2 separate sites of the small bowel, status post en bloc resection and partial omentectomy   (06/2023), with multiple cycles of chemoimmunotherapy.     TECHNIQUE: After  obtaining the patient's consent, 13.6 millicuries of F-18 FDG was administered into a  right hand vein.  Whole body PET/CT imaging was performed of the entire body, skull to feet, with multi-planar imaging without oral or intravenous  contrast material, using a dedicated integrated PET/CT scanner. Low dose non-contrast CT scanning was performed using a dedicated integrated PET/CT scanner for attenuation correction and anatomic localization only.     PATIENT BLOOD GLUCOSE: 156 mg/dL.    UPTAKE TIME:  65 minutes     FINDINGS:  COMMENT: Attenuation corrected and non-attenuation corrected images were reviewed from the head to the feet.  REFERENCE VALUES: The SUV max of the mediastinal blood pool is 3.0. The SUV max of the liver is 4.3.  HEAD/NECK: Mild metallic streak artifact from dental amalgam is appreciated. No pathologic FDG activity.    LUNGS/PLEURA: A few scattered pulmonary micronodules are re-identified, but remain too small for scintigraphic evaluation. There is dependent subsegmental atelectasis bilaterally. Additional scattered ground-glass and reticular opacities are present and  may be atelectatic in origin. No pathologic FDG activity. No FDG avid pulmonary nodules.    MEDIASTINUM/KIERSTEN: A 1.1 x 2.1 cm right paratracheal lymph node (series 3, image 193) is seen with SUV max of 4.2, unchanged.  The thoracic aorta has normal-variant two-vessel configuration with a shared origin of the brachiocephalic trunk and left common carotid artery.  CHEST WALL/AXILLA: No pathologic FDG activity.    ABDOMEN/PELVIS: No suspicious mesenteric hypermetabolic lymph nodes are appreciated on the current study. A small hiatal hernia is evident. Postoperative changes of partial small-bowel resection are demonstrated with enteroenteric anastomosis in the  right upper quadrant. There has been decrease in perianastomotic activity with SUV max of 4.5 currently.  The prostate is enlarged, measuring 5.9 cm transversely. There is no  visible intraluminal bladder calculus. There is circumferential bladder wall thickening out of proportion to the degree of distention.  MUSCULOSKELETAL: Mild scoliosis and multilevel degenerative changes of the spine are appreciated. Degenerative changes of the hips are present bilaterally. There are degenerative changes of the knees. No pathologic FDG activity. No suspicious lesions on  CT imaging.    OTHER: Generalized laxity of the ventral abdominal wall is demonstrated. Areas of christianne-incisional fat protrusion are perceived. Small bilateral fat-containing inguinal hernias are present. There are suspected bilateral hydroceles.                   Impression  CONCLUSION:  1. Stable borderline right paratracheal lymphadenopathy with unchanged mild hypermetabolic activity.     2. Postoperative changes of partial bowel resection without discernible suspicious hypermetabolic activity.       3. Prostatomegaly with chronic outlet obstruction.     4. Lesser incidental findings as above.           elm-remote.        Dictated by (CST): Pool Rivera MD on 10/24/2024 at 5:45 PM      Finalized by (CST): Pool Rivera MD on 10/24/2024 at 6:01 PM

## 2024-11-12 ENCOUNTER — SOCIAL WORK SERVICES (OUTPATIENT)
Dept: HEMATOLOGY/ONCOLOGY | Facility: HOSPITAL | Age: 59
End: 2024-11-12

## 2024-11-12 NOTE — PROGRESS NOTES
SW followed up with patient to offer assistance with his immigration status. Patient informed SW that he did not need anything additional and he has been extended for one year. Patient has Financial Assistance with the hospital approved through 4/15/25.

## 2024-12-05 ENCOUNTER — OFFICE VISIT (OUTPATIENT)
Dept: HEMATOLOGY/ONCOLOGY | Facility: HOSPITAL | Age: 59
End: 2024-12-05
Attending: INTERNAL MEDICINE

## 2024-12-05 ENCOUNTER — SOCIAL WORK SERVICES (OUTPATIENT)
Dept: HEMATOLOGY/ONCOLOGY | Facility: HOSPITAL | Age: 59
End: 2024-12-05

## 2024-12-05 ENCOUNTER — OFFICE VISIT (OUTPATIENT)
Dept: HEMATOLOGY/ONCOLOGY | Facility: HOSPITAL | Age: 59
End: 2024-12-05
Attending: NURSE PRACTITIONER

## 2024-12-05 VITALS
OXYGEN SATURATION: 98 % | SYSTOLIC BLOOD PRESSURE: 141 MMHG | DIASTOLIC BLOOD PRESSURE: 78 MMHG | TEMPERATURE: 98 F | BODY MASS INDEX: 36.1 KG/M2 | RESPIRATION RATE: 18 BRPM | HEART RATE: 57 BPM | WEIGHT: 230 LBS | HEIGHT: 67 IN

## 2024-12-05 DIAGNOSIS — C43.61 MALIGNANT MELANOMA OF RIGHT UPPER EXTREMITY INCLUDING SHOULDER (HCC): Primary | ICD-10-CM

## 2024-12-05 DIAGNOSIS — C78.89 MELANOMA METASTATIC TO DIGESTIVE ORGAN (HCC): ICD-10-CM

## 2024-12-05 DIAGNOSIS — Z51.12 ENCOUNTER FOR ANTINEOPLASTIC IMMUNOTHERAPY: ICD-10-CM

## 2024-12-05 LAB
ALBUMIN SERPL-MCNC: 4.6 G/DL (ref 3.2–4.8)
ALBUMIN/GLOB SERPL: 1.7 {RATIO} (ref 1–2)
ALP LIVER SERPL-CCNC: 85 U/L
ALT SERPL-CCNC: 31 U/L
ANION GAP SERPL CALC-SCNC: 6 MMOL/L (ref 0–18)
AST SERPL-CCNC: 19 U/L (ref ?–34)
BASOPHILS # BLD AUTO: 0.06 X10(3) UL (ref 0–0.2)
BASOPHILS NFR BLD AUTO: 1.2 %
BILIRUB SERPL-MCNC: 0.6 MG/DL (ref 0.3–1.2)
BUN BLD-MCNC: 16 MG/DL (ref 9–23)
BUN/CREAT SERPL: 18.2 (ref 10–20)
CALCIUM BLD-MCNC: 9.6 MG/DL (ref 8.7–10.4)
CHLORIDE SERPL-SCNC: 105 MMOL/L (ref 98–112)
CO2 SERPL-SCNC: 28 MMOL/L (ref 21–32)
CREAT BLD-MCNC: 0.88 MG/DL
DEPRECATED RDW RBC AUTO: 41.7 FL (ref 35.1–46.3)
EGFRCR SERPLBLD CKD-EPI 2021: 99 ML/MIN/1.73M2 (ref 60–?)
EOSINOPHIL # BLD AUTO: 0.05 X10(3) UL (ref 0–0.7)
EOSINOPHIL NFR BLD AUTO: 1 %
ERYTHROCYTE [DISTWIDTH] IN BLOOD BY AUTOMATED COUNT: 11.9 % (ref 11–15)
GLOBULIN PLAS-MCNC: 2.7 G/DL (ref 2–3.5)
GLUCOSE BLD-MCNC: 176 MG/DL (ref 70–99)
HCT VFR BLD AUTO: 44.1 %
HGB BLD-MCNC: 15.6 G/DL
IMM GRANULOCYTES # BLD AUTO: 0.01 X10(3) UL (ref 0–1)
IMM GRANULOCYTES NFR BLD: 0.2 %
LYMPHOCYTES # BLD AUTO: 2.08 X10(3) UL (ref 1–4)
LYMPHOCYTES NFR BLD AUTO: 41.2 %
MCH RBC QN AUTO: 33.5 PG (ref 26–34)
MCHC RBC AUTO-ENTMCNC: 35.4 G/DL (ref 31–37)
MCV RBC AUTO: 94.8 FL
MONOCYTES # BLD AUTO: 0.53 X10(3) UL (ref 0.1–1)
MONOCYTES NFR BLD AUTO: 10.5 %
NEUTROPHILS # BLD AUTO: 2.32 X10 (3) UL (ref 1.5–7.7)
NEUTROPHILS # BLD AUTO: 2.32 X10(3) UL (ref 1.5–7.7)
NEUTROPHILS NFR BLD AUTO: 45.9 %
OSMOLALITY SERPL CALC.SUM OF ELEC: 293 MOSM/KG (ref 275–295)
PLATELET # BLD AUTO: 215 10(3)UL (ref 150–450)
POTASSIUM SERPL-SCNC: 4.4 MMOL/L (ref 3.5–5.1)
PROT SERPL-MCNC: 7.3 G/DL (ref 5.7–8.2)
RBC # BLD AUTO: 4.65 X10(6)UL
SODIUM SERPL-SCNC: 139 MMOL/L (ref 136–145)
TSI SER-ACNC: 2.1 UIU/ML (ref 0.55–4.78)
WBC # BLD AUTO: 5.1 X10(3) UL (ref 4–11)

## 2024-12-05 PROCEDURE — 96413 CHEMO IV INFUSION 1 HR: CPT

## 2024-12-05 PROCEDURE — 80053 COMPREHEN METABOLIC PANEL: CPT

## 2024-12-05 PROCEDURE — 84443 ASSAY THYROID STIM HORMONE: CPT

## 2024-12-05 PROCEDURE — 99215 OFFICE O/P EST HI 40 MIN: CPT | Performed by: NURSE PRACTITIONER

## 2024-12-05 PROCEDURE — 85025 COMPLETE CBC W/AUTO DIFF WBC: CPT

## 2024-12-05 NOTE — PROGRESS NOTES
HPI   Cole Kendrick is a 59 year old male here for follow up of   Encounter Diagnoses   Name Primary?    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    Oncology history:   Extensive outside records from multiple facilities since March 2021 were reviewed.  This is a synopsis of those records, details can be found on Care Everywhere.  Patient had presented at [facility] in [facility] March 2021 due to a mass of the right shoulder.  At that time he underwent imaging with an MRI which showed a superficial right upper arm mass which appeared to be veins within the dermis.  It measured 3.9 cm x 8.3 cm x 5.9 cm it was felt that there was likely internal process of hemorrhage within the mass.  The mass demonstrated significant enhancement and it extended to the involved adjacent thickened areas of the dermis.  This mass was abutting the deltoid muscle and mildly effacing the deltoid muscle but not invading it.  There is also evidence of 13 mm short axis diameter right axillary lymph node which appeared to have a thickened cortex and was felt to be abnormal.  Dedicated ultrasound of the right axilla was recommended for further evaluation.  There were other smaller lymph nodes adjacent to the area.  Patient subsequently underwent right axillary lymph node ultrasound guided biopsy, the area contained multiple and large lymph nodes which were measuring between 3.5 and 3.2 cm.  There is at least 3 of them.  Pathology report showed lymph node tissue showing features consistent with nonspecific reactive lymphoid hyperplasia, there is no evidence of metastatic disease, nor a lymphoproliferative process or malignancy.     Patient was then referred to [facility], and was evaluated by her surgical oncologist Dr. Fabián Dawn.  Per review of his  consultation report, the patient had stated he had noted a new (mole) on the right arm 9 months prior to his original presentation and had enlarged over several months and began to bleed.  It was not painful and was not causing any neuromuscular issues.  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  We did discuss causes the results of the MRI above.  I was discussed for the patient to proceed with a radical resection of the right upper arm mass with placement of the wound VAC after performing the wide excision of the mass.  Surgical resection was performed on 5/26/2021 with en bloc resection of 12 cm with partial resection of the underlying right deltoid muscle.  Final wound measured 15 cm in diameter by 4 cm deep, and a wound VAC in place.  The pathology report showed that this was consistent with a malignant melanoma that measured 10.5 cm and was focally in the epidermis abutting ulcer with minute focus of possible melanoma in situ.  BRAF V600E was positive.  As the tumor staging summary, the histologic type was not determined.  The Breslow depth could not be determined.  There was ulceration present.  The mitotic rate was greater than 1/millimeter square.  Lymphovascular invasion was present.  Tumor infiltrating lymphocytes were present but nonbrisk.  There is no tumor regression noted.  The peripheral margins, and the deep margins were negative for invasive melanoma.  Tumor was 15 mm from the closest peripheral margin and 10 mm from the closest deep margin.  Plastic surgery was consulted for wound closure with skin grafting.  Patient underwent skin grafting on 6/8/2021.  Donor site was from the right thigh.     Patient was then seeking oncologic care in [facility] in [facility].  The records from that organization are not available on Care  Everywhere.  The patient states that he was receiving treatment with the local oncologist, Dr. Arce, with about 8 pills a day he was taking twice a day.  He states that he would pick them up at his doctor's office.  He states that he had had a PET scan which is showed positive lymph nodes in his right axilla.  On records available in care everywhere on 3/3/2022, there was a CT scan of the chest, abdomen and pelvis with contrast, which was compared to a PET/CT from September 16, 2021.  This states that on the prior PET/CT from September 2021 there had been enlarged right axillary lymphadenopathy that had measured 2.6 x 2.1 cm, and on the current study from March 3, 2022 it had resolved.  There was no other evidence seen of metastatic disease.  There was a 1 cm flash enhancing lesion in the right anterior lobe of the liver which was felt to be a hemangioma.     Then on July 25, 2022, it was noted that the patient had iron deficiency anemia.  At that time, on the note from the infusion center on 9/8/2022, at which time the patient was receiving Venofer infusions, the only medication listed on his med list was tramadol.  He did receive infusion of 300 mg of Venofer x6 doses as an outpatient.  Last dose was given on 10/27/2022.     On November 7, 2022, the patient received a blood transfusion for hemoglobin of 6.2, transfusion occurred on 11/8/2022.  Patient was then admitted on 12/21/2023 and discharged on 12/23/2023 to [facility], due to secondary acute anemia from blood loss secondary to GI bleed.  At that time he had presented with several weeks of dizziness, weakness, and left lower extremity swelling.  His oncologist recommended he go to the ED for evaluation.  He did a left lower extremity Doppler which was negative for DVT.  Hemoglobin at the time was 3.2.  Patient had complained of several weeks of painless red-maroon or black-colored stools which were not often.  He denied any abdominal  pain.  He denied any nausea or vomiting.  Patient received a total of 6 units of packed red cells and underwent an EGD which revealed 5 gastric ulcers with no active bleeding.  He was placed on 3 months of PPI therapy twice daily and was recommended for repeat EGD in 2 months.  At that time, the only medication listed was metformin.   Patient was then readmitted on 1/4/2023 and discharged on 1/6/2023, due to a recurrent GI bleed.  At that time, he had reported an abrupt onset of a sensation of fatigue, diaphoresis and dizziness, and then started having dark stools.  When he presented to the ED, with hypotension and hemoglobin of 8.1.  There was concern for recurrent GI bleed, however there is no evidence of overt GI bleed.  He was transfused 2 units of packed red cells, with hemoglobin up to 9.3 on day of discharge.  It was not recommended to repeat endoscopy at the time.  He was recommended to continue with the pantoprazole twice daily.  He had also had a tagged red cell scan during the first admission which was negative.       Patient presented back to his oncologist on 01/11/2023, with a hemoglobin of 6.5.  On 1/12/2023 he was then transfused 2 units of packed red cells at the outpatient infusion center at [facility].  Subsequent hemoglobin on 1/19/2023 was 7.1.  She did have repeat iron studies on 1/23/2023, with a ferritin of 12, percent iron saturation of 4%, LDH elevated to 364, with a total bilirubin of 0.4.  He again was managed with Venofer 300 mg infusions weekly x9 weeks.  With last dose administered on 5/4/2023.     Patient then had PET/CT on 2/02/2023, which at that time showed 2 thickened hypermetabolic small bowel loops with SUV max of 11.07 and 13.72.  They have felt that these findings were suspicious for an intussusception and a CT scan of the abdomen pelvis was recommended with and without contrast.  They still noted that there was no right axillary lymphadenopathy.     Patient was then  readmitted on 5/17/2023 through 5/18/2023, again due to acute on chronic blood loss anemia and received 2 units of packed red cells.  It was suspected to be an upper GI bleed and was referred for outpatient capsule endoscopy.  At the time of presentation on 5/17/2023 he had offered a 4-day history of black stools and dizziness in the morning.  When he presented to the ED his hemoglobin was 5.7.  After transfusion of 2 units of packed red cells his hemoglobin was up to 7.1.  Last hemoglobin on file as outpatient on 5/25/2023 was 8.4 with ferritin of 11.     Presented to [facility] ER on 6/28/23 with c/o weakness, epigastric pain and melanotic stools.  Hgb 4.6.  PMH c/w DM, gastric ulcers with h/o GI bleed s/p IV iron.  He underwent an EGD which did not show any evidence of upper GI bleeding. There were 2 small superficial ulcers/erosions in the duodenal bulb which were not felt to be the cause of the profound anemia with hemoglobin of less than 5 on admission. Patient was recommended to have a CT enterography due to PET/CT on 2/2/2023 with 2 thickened hypermetabolic masses and small bowel which were felt to be secondary to intussusception. Capsule endoscopy was to be considered pending results. CT enterography was performed on 6/29/2023, which showed an 8.7 cm area of masslike thickening involving a loop of small bowel within the right lower quadrant. There was an additional 7 cm area of masslike thickening involving a loop of small bowel within the left lower quadrant. These findings were felt to be suspicious of metastatic disease to small bowel given best history of melanoma.     S/p en bloc small bowel resection and [facility] on 6/30/23 - two separate foci of metastatic melanoma, 7.5 cm and 6.5 cm, BRAF mutant.      S/p cycle 4 Ipilimumab with 3 mg/Nivolumab 1 mg.  Tolerated very well.    Currently s/p cycle 24 single agent nivolumab now at 480 mg every 4 weeks. Tolerating well.     Fatigue:   No    Fevers:  No    Appetite/taste changes:  No    Mucositis:  No    Weight changes:  No    Nausea/vomiting:  No    Diarrhea:  No    Constipation:  No    Peripheral neuropathy:  No    Follows with his PCP.  Was not started on DM meds.  Monitoring BP.    Lives in [place], [place].    Feeling well today. New abd pain to RUQ 4-5/10 with inflammation. Taking tylenol 2 x per day for pain.      ECOG PS 0    Review of Systems:   Review of Systems   Eyes:  Negative for eye problems.   Respiratory:  Negative for cough and shortness of breath.    Cardiovascular:  Negative for chest pain, leg swelling and palpitations.   Gastrointestinal:  Positive for abdominal pain (At the RLQ for the past month, feels like pins and numb.  Does not last long.). Negative for blood in stool.        Feels the right abdomen is larger than the Left.    Endocrine:        No heat or cold intolerance   Genitourinary:  Negative for difficulty urinating, dysuria, frequency and hematuria.    Musculoskeletal:  Negative for arthralgias, back pain and neck pain.   Skin:  Negative for itching and rash.   Neurological:  Negative for dizziness, extremity weakness and headaches.   Hematological:  Negative for adenopathy. Does not bruise/bleed easily.   Psychiatric/Behavioral:  Negative for sleep disturbance.          No current outpatient medications on file.     Allergies:   No Known Allergies    Past Medical History:    Anemia    with multiple blood transfusion    GIB (gastrointestinal bleeding)    Melanoma (HCC)    right shoulder s/p surgical excision and skin graft, adjuvant BRAF/MEK inhibitors for 1.5 yrs    Personal history of antineoplastic chemotherapy     Past Surgical History:   Procedure Laterality Date    Other surgical history      small intestine surgery    Upper gi endoscopy,exam  12/01/2022     Social History     Socioeconomic History    Marital status: Single   Tobacco Use    Smoking status: Never    Smokeless tobacco: Never   Vaping Use    Vaping  status: Never Used   Substance and Sexual Activity    Alcohol use: Not Currently    Drug use: Never       No family history on file.      PHYSICAL EXAM:    /78 (BP Location: Right arm, Patient Position: Sitting, Cuff Size: large)   Pulse 57   Temp 98 °F (36.7 °C) (Oral)   Resp 18   Ht 1.702 m (5' 7\")   Wt 104.3 kg (230 lb)   SpO2 98%   BMI 36.02 kg/m²   Wt Readings from Last 6 Encounters:   12/05/24 104.3 kg (230 lb)   11/07/24 104.8 kg (231 lb)   09/12/24 106.1 kg (234 lb)   08/15/24 103.9 kg (229 lb)   07/18/24 105.1 kg (231 lb 11.2 oz)   06/20/24 105.7 kg (233 lb)     Physical Exam  General: Patient is alert, not in acute distress.    HEENT: EOMs intact. PERRL, Anicteric.  MMM.   Neck: No JVD. No palpable lymphadenopathy. Neck is supple, surgical scar.  Chest: Clear to auscultation.  Heart: Regular rate and rhythm.   Abdomen: Soft, non tender with good bowel sounds.  Surgical scars. RUQ discomfort and inflammation to R lateral side  Extremities: No edema.  Neurological: Grossly intact.   Psych/Depression: nl        ASSESSMENT/PLAN:     1. Malignant melanoma of right upper extremity including shoulder (HCC)    2. 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 Encounter for antineoplastic immunotherapy        Malignant melanoma of right upper extremity including shoulder (HCC)  Proceed with treatment with nivolumab and ipilimumab x 4 cycles followed by maintenance nivolumab for total of 18 to 24 months.  Discussed goal of treatment is still potentially curative with these agents.      S/p cycle 4 Ipilimumab 3 mg/Nivolumab 1 mg.      Tumor assessment after the fourth cycle of treatment with marked response to therapy.  Tumor assessment after cycle 10 with very minimal activity in the LN in the abdomen.  Most recent restaging study was April 11, 2024 with stable mesenteric lymph node in the right mid abdomen with mild FDG activity compatible with stable treated disease.  Also patient had nonenlarged  right paratracheal node measuring 7 mm which is mildly active which was likely reactive.  This will be followed on upcoming PET/CT 2024.    S/p cycle 24 maintenance nivolumab 480mg every 4 weeks.       Proceed with cycle 25.  Given that the patient lives far, and FDA approved to the 480 mg every 4 weeks nivolumab, will switch his completion of his treatment to this dosing and schedule given his distance to the treatment center.    Restaging PET/CT on 10/24/2024 with no evidence of disease.  He has a borderline right paratracheal lymphadenopathy which is mildly hypermetabolic and is unchanged.      Reviewed with Dr Blanco new R sided pain. PET scan due in Feb will move up due to new symptoms.     Called  patient back with  [facility] #[id number]  Pt to have repeat PET scan soon to evaluate new pain. Order placed and pt to call to schedule.         HTN; discussed with patient.  Had caffeine prior to visit.  He will follow with PCP if further issues..    Goal is to complete a total of 18 months to 24 months of treatment from initiation of therapy.  24 months will be in August of 2025.    No orders of the defined types were placed in this encounter.    Call prn.  Follow-up in 4 weeks    MDM: high risk    Results From Past 48 Hours:  Recent Results (from the past 48 hours)   CBC W Differential W Platelet    Collection Time: 12/05/24  8:04 AM   Result Value Ref Range    WBC 5.1 4.0 - 11.0 x10(3) uL    RBC 4.65 4.30 - 5.70 x10(6)uL    HGB 15.6 13.0 - 17.5 g/dL    HCT 44.1 39.0 - 53.0 %    MCV 94.8 80.0 - 100.0 fL    MCH 33.5 26.0 - 34.0 pg    MCHC 35.4 31.0 - 37.0 g/dL    RDW-SD 41.7 35.1 - 46.3 fL    RDW 11.9 11.0 - 15.0 %    .0 150.0 - 450.0 10(3)uL    Neutrophil Absolute Prelim 2.32 1.50 - 7.70 x10 (3) uL    Neutrophil Absolute 2.32 1.50 - 7.70 x10(3) uL    Lymphocyte Absolute 2.08 1.00 - 4.00 x10(3) uL    Monocyte Absolute 0.53 0.10 - 1.00 x10(3) uL    Eosinophil Absolute 0.05 0.00 - 0.70 x10(3) uL    Basophil  Absolute 0.06 0.00 - 0.20 x10(3) uL    Immature Granulocyte Absolute 0.01 0.00 - 1.00 x10(3) uL    Neutrophil % 45.9 %    Lymphocyte % 41.2 %    Monocyte % 10.5 %    Eosinophil % 1.0 %    Basophil % 1.2 %    Immature Granulocyte % 0.2 %   Comp Metabolic Panel (14)    Collection Time: 12/05/24  8:04 AM   Result Value Ref Range    Glucose 176 (H) 70 - 99 mg/dL    Sodium 139 136 - 145 mmol/L    Potassium 4.4 3.5 - 5.1 mmol/L    Chloride 105 98 - 112 mmol/L    CO2 28.0 21.0 - 32.0 mmol/L    Anion Gap 6 0 - 18 mmol/L    BUN 16 9 - 23 mg/dL    Creatinine 0.88 0.70 - 1.30 mg/dL    BUN/CREA Ratio 18.2 10.0 - 20.0    Calcium, Total 9.6 8.7 - 10.4 mg/dL    Calculated Osmolality 293 275 - 295 mOsm/kg    eGFR-Cr 99 >=60 mL/min/1.73m2    ALT 31 10 - 49 U/L    AST 19 <34 U/L    Alkaline Phosphatase 85 45 - 117 U/L    Bilirubin, Total 0.6 0.3 - 1.2 mg/dL    Total Protein 7.3 5.7 - 8.2 g/dL    Albumin 4.6 3.2 - 4.8 g/dL    Globulin  2.7 2.0 - 3.5 g/dL    A/G Ratio 1.7 1.0 - 2.0    Patient Fasting for CMP? Patient not present    TSH W REFLEX TO FREE T4 [E]    Collection Time: 12/05/24  8:04 AM   Result Value Ref Range    TSH 2.105 0.550 - 4.780 uIU/mL     PROCEDURE: PET/CT (NON-STAND) SCANS(SKULL TO TOES) (CPT=78816)     COMPARISON: CT ENTEROGRAPHY ABD PEL W CONTRAST SH (CPT-91571), 6/29/2023, 4:15 PM.  [facility], PET (NON-STAND) SCANS(SKULL TO TOES) (CPT=78816), 8/01/2023, 7:28 AM.  CT SOFT TISSUE OF NECK (CONTRAST ONLY) (CPT=70491), 8/30/2023,   6:15 PM.  [facility], PET (NON-STAND) SCANS(SKULL TO TOES) (CPT=78816), 1/09/2024, 9:01 AM.  PET (NON-STAND) SCANS(SKULL TO TOES) (CPT=78816), 10/18/2023, 7:29 AM.  [facility], CT CHEST+ABDOMEN+PELVIS(ALL CNTRST  ONLY)(CPT=71260/50723), 7/18/2024, 8:17 AM.  [facility], PET (NON-STAND) SCANS(SKULL TO TOES) (CPT=78816), 4/11/2024, 8:27 AM.     INDICATIONS: Subsequent Treatment Strategy: 59-year-old  male with history of malignant melanoma of the right shoulder, complicated by metastatic melanoma involving 2 separate sites of the small bowel, status post en bloc resection and partial omentectomy   (06/2023), with multiple cycles of chemoimmunotherapy.     TECHNIQUE: After obtaining the patient's consent, 13.6 millicuries of F-18 FDG was administered into a  right hand vein.  Whole body PET/CT imaging was performed of the entire body, skull to feet, with multi-planar imaging without oral or intravenous  contrast material, using a dedicated integrated PET/CT scanner. Low dose non-contrast CT scanning was performed using a dedicated integrated PET/CT scanner for attenuation correction and anatomic localization only.     PATIENT BLOOD GLUCOSE: 156 mg/dL.    UPTAKE TIME:  65 minutes     FINDINGS:  COMMENT: Attenuation corrected and non-attenuation corrected images were reviewed from the head to the feet.  REFERENCE VALUES: The SUV max of the mediastinal blood pool is 3.0. The SUV max of the liver is 4.3.  HEAD/NECK: Mild metallic streak artifact from dental amalgam is appreciated. No pathologic FDG activity.    LUNGS/PLEURA: A few scattered pulmonary micronodules are re-identified, but remain too small for scintigraphic evaluation. There is dependent subsegmental atelectasis bilaterally. Additional scattered ground-glass and reticular opacities are present and  may be atelectatic in origin. No pathologic FDG activity. No FDG avid pulmonary nodules.    MEDIASTINUM/KIERSTEN: A 1.1 x 2.1 cm right paratracheal lymph node (series 3, image 193) is seen with SUV max of 4.2, unchanged.  The thoracic aorta has normal-variant two-vessel configuration with a shared origin of the brachiocephalic trunk and left common carotid artery.  CHEST WALL/AXILLA: No pathologic FDG activity.    ABDOMEN/PELVIS: No suspicious mesenteric hypermetabolic lymph nodes are appreciated on the current study. A small hiatal hernia is evident.  Postoperative changes of partial small-bowel resection are demonstrated with enteroenteric anastomosis in the  right upper quadrant. There has been decrease in perianastomotic activity with SUV max of 4.5 currently.  The prostate is enlarged, measuring 5.9 cm transversely. There is no visible intraluminal bladder calculus. There is circumferential bladder wall thickening out of proportion to the degree of distention.  MUSCULOSKELETAL: Mild scoliosis and multilevel degenerative changes of the spine are appreciated. Degenerative changes of the hips are present bilaterally. There are degenerative changes of the knees. No pathologic FDG activity. No suspicious lesions on  CT imaging.    OTHER: Generalized laxity of the ventral abdominal wall is demonstrated. Areas of christianne-incisional fat protrusion are perceived. Small bilateral fat-containing inguinal hernias are present. There are suspected bilateral hydroceles.                   Impression  CONCLUSION:  1. Stable borderline right paratracheal lymphadenopathy with unchanged mild hypermetabolic activity.     2. Postoperative changes of partial bowel resection without discernible suspicious hypermetabolic activity.       3. Prostatomegaly with chronic outlet obstruction.     4. Lesser incidental findings as above.           elm-remote.        Dictated by (CST): Pool Rivera MD on 10/24/2024 at 5:45 PM      Finalized by (CST): Pool Rivera MD on 10/24/2024 at 6:01 PM

## 2024-12-05 NOTE — PROGRESS NOTES
Pt here for C25D1 Drug(s)OPdivo.  Arrives Ambulating independently, accompanied by Self     Patient was evaluated today by EMIR.    Oral medications included in this regimen:  no    Patient confirms comprehension of cancer treatment schedule:  yes    Pregnancy screening:  Not applicable    Modifications in dose or schedule:  No    Medications appearance and physical integrity checked by RN: yes.    Chemotherapy IV pump settings verified by 2 RNs:  No due to targeted therapy IV administration.  Frequency of blood return and site check throughout administration: Prior to administration and At completion of therapy     Infusion/treatment outcome:  patient tolerated treatment without incident    Education Record    Learner:  Patient  Barriers / Limitations:  Language  Method:  Brief focused and Discussion  Education / instructions given:  Plan of care for treatment today  Outcome:  Shows understanding    Discharged Home, Ambulating independently, accompanied by:Self    Patient/family verbalized understanding of future appointments: by printed AVS

## 2024-12-05 NOTE — PROGRESS NOTES
SIOBHAN met with patient using language line  services All ID [id number]. SIOBHAN explain that patient's [employer] application is still inactive. SIOBHAN explained that he is under financial assistance through the hospital through 4/15/2025.

## 2025-01-02 ENCOUNTER — OFFICE VISIT (OUTPATIENT)
Age: 60
End: 2025-01-02
Attending: INTERNAL MEDICINE

## 2025-01-02 ENCOUNTER — OFFICE VISIT (OUTPATIENT)
Age: 60
End: 2025-01-02
Attending: NURSE PRACTITIONER

## 2025-01-02 ENCOUNTER — NURSE ONLY (OUTPATIENT)
Age: 60
End: 2025-01-02
Attending: INTERNAL MEDICINE

## 2025-01-02 VITALS
HEIGHT: 67 IN | RESPIRATION RATE: 18 BRPM | DIASTOLIC BLOOD PRESSURE: 68 MMHG | BODY MASS INDEX: 35.37 KG/M2 | SYSTOLIC BLOOD PRESSURE: 126 MMHG | HEART RATE: 61 BPM | WEIGHT: 225.38 LBS | OXYGEN SATURATION: 97 % | TEMPERATURE: 98 F

## 2025-01-02 DIAGNOSIS — C43.61 MALIGNANT MELANOMA OF RIGHT UPPER EXTREMITY INCLUDING SHOULDER (HCC): Primary | ICD-10-CM

## 2025-01-02 DIAGNOSIS — C78.89 MELANOMA METASTATIC TO DIGESTIVE ORGAN (HCC): ICD-10-CM

## 2025-01-02 DIAGNOSIS — Z51.12 ENCOUNTER FOR ANTINEOPLASTIC IMMUNOTHERAPY: ICD-10-CM

## 2025-01-02 LAB
ALBUMIN SERPL-MCNC: 4.6 G/DL (ref 3.2–4.8)
ALBUMIN/GLOB SERPL: 1.7 {RATIO} (ref 1–2)
ALP LIVER SERPL-CCNC: 77 U/L
ALT SERPL-CCNC: 34 U/L
ANION GAP SERPL CALC-SCNC: 5 MMOL/L (ref 0–18)
AST SERPL-CCNC: 20 U/L (ref ?–34)
BASOPHILS # BLD AUTO: 0.06 X10(3) UL (ref 0–0.2)
BASOPHILS NFR BLD AUTO: 0.7 %
BILIRUB SERPL-MCNC: 0.4 MG/DL (ref 0.3–1.2)
BUN BLD-MCNC: 19 MG/DL (ref 9–23)
BUN/CREAT SERPL: 19.6 (ref 10–20)
CALCIUM BLD-MCNC: 9.4 MG/DL (ref 8.7–10.4)
CHLORIDE SERPL-SCNC: 110 MMOL/L (ref 98–112)
CO2 SERPL-SCNC: 25 MMOL/L (ref 21–32)
CREAT BLD-MCNC: 0.97 MG/DL
DEPRECATED RDW RBC AUTO: 43.5 FL (ref 35.1–46.3)
EGFRCR SERPLBLD CKD-EPI 2021: 90 ML/MIN/1.73M2 (ref 60–?)
EOSINOPHIL # BLD AUTO: 0.06 X10(3) UL (ref 0–0.7)
EOSINOPHIL NFR BLD AUTO: 0.7 %
ERYTHROCYTE [DISTWIDTH] IN BLOOD BY AUTOMATED COUNT: 12.5 % (ref 11–15)
GLOBULIN PLAS-MCNC: 2.7 G/DL (ref 2–3.5)
GLUCOSE BLD-MCNC: 135 MG/DL (ref 70–99)
HCT VFR BLD AUTO: 44.2 %
HGB BLD-MCNC: 15.1 G/DL
IMM GRANULOCYTES # BLD AUTO: 0.04 X10(3) UL (ref 0–1)
IMM GRANULOCYTES NFR BLD: 0.5 %
LYMPHOCYTES # BLD AUTO: 1.95 X10(3) UL (ref 1–4)
LYMPHOCYTES NFR BLD AUTO: 23 %
MCH RBC QN AUTO: 32.1 PG (ref 26–34)
MCHC RBC AUTO-ENTMCNC: 34.2 G/DL (ref 31–37)
MCV RBC AUTO: 94 FL
MONOCYTES # BLD AUTO: 0.77 X10(3) UL (ref 0.1–1)
MONOCYTES NFR BLD AUTO: 9.1 %
NEUTROPHILS # BLD AUTO: 5.59 X10 (3) UL (ref 1.5–7.7)
NEUTROPHILS # BLD AUTO: 5.59 X10(3) UL (ref 1.5–7.7)
NEUTROPHILS NFR BLD AUTO: 66 %
OSMOLALITY SERPL CALC.SUM OF ELEC: 294 MOSM/KG (ref 275–295)
PLATELET # BLD AUTO: 275 10(3)UL (ref 150–450)
POTASSIUM SERPL-SCNC: 4.7 MMOL/L (ref 3.5–5.1)
PROT SERPL-MCNC: 7.3 G/DL (ref 5.7–8.2)
RBC # BLD AUTO: 4.7 X10(6)UL
SODIUM SERPL-SCNC: 140 MMOL/L (ref 136–145)
TSI SER-ACNC: 2.81 UIU/ML (ref 0.55–4.78)
WBC # BLD AUTO: 8.5 X10(3) UL (ref 4–11)

## 2025-01-02 NOTE — PROGRESS NOTES
Pt here for C26 Opdivo.  Arrives Ambulating independently, accompanied by Self     Patient was evaluated today by EMIR.  Oral medications included in this regimen:  no  Patient confirms comprehension of cancer treatment schedule:  yes  Pregnancy screening:  Not applicable    Modifications in dose or schedule:  No    Medications appearance and physical integrity checked by RN: yes. Chemotherapy IV pump settings verified by 2 RNs:  No due to targeted therapy IV administration.    Frequency of blood return and site check throughout administration: Prior to administration and At completion of therapy     Infusion/treatment outcome:  patient tolerated treatment without incident    Education Record    Learner:  Patient  Barriers / Limitations:  None  Method:  Reinforcement  Education / instructions given:  Plan of care   Outcome:  Shows understanding    Discharged Home, Ambulating independently. Patient/family verbalized understanding of future appointments: by printed AVS

## 2025-01-02 NOTE — PROGRESS NOTES
HPI   Cole Kendrick is a 59 year old male here for follow up of   Encounter Diagnoses   Name Primary?    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    Oncology history:   Extensive outside records from multiple facilities since March 2021 were reviewed.  This is a synopsis of those records, details can be found on Care Everywhere.  Patient had presented at [facility] in [facility] March 2021 due to a mass of the right shoulder.  At that time he underwent imaging with an MRI which showed a superficial right upper arm mass which appeared to be veins within the dermis.  It measured 3.9 cm x 8.3 cm x 5.9 cm it was felt that there was likely internal process of hemorrhage within the mass.  The mass demonstrated significant enhancement and it extended to the involved adjacent thickened areas of the dermis.  This mass was abutting the deltoid muscle and mildly effacing the deltoid muscle but not invading it.  There is also evidence of 13 mm short axis diameter right axillary lymph node which appeared to have a thickened cortex and was felt to be abnormal.  Dedicated ultrasound of the right axilla was recommended for further evaluation.  There were other smaller lymph nodes adjacent to the area.  Patient subsequently underwent right axillary lymph node ultrasound guided biopsy, the area contained multiple and large lymph nodes which were measuring between 3.5 and 3.2 cm.  There is at least 3 of them.  Pathology report showed lymph node tissue showing features consistent with nonspecific reactive lymphoid hyperplasia, there is no evidence of metastatic disease, nor a lymphoproliferative process or malignancy.     Patient was then referred to cancer center at [facility] in [facility], and was evaluated by her surgical oncologist Dr. Fabián Dawn.  Per review of his  consultation report, the patient had stated he had noted a new (mole) on the right arm 9 months prior to his original presentation and had enlarged over several months and began to bleed.  It was not painful and was not causing any neuromuscular issues.  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  We did discuss causes the results of the MRI above.  I was discussed for the patient to proceed with a radical resection of the right upper arm mass with placement of the wound VAC after performing the wide excision of the mass.  Surgical resection was performed on 5/26/2021 with en bloc resection of 12 cm with partial resection of the underlying right deltoid muscle.  Final wound measured 15 cm in diameter by 4 cm deep, and a wound VAC in place.  The pathology report showed that this was consistent with a malignant melanoma that measured 10.5 cm and was focally in the epidermis abutting ulcer with minute focus of possible melanoma in situ.  BRAF V600E was positive.  As the tumor staging summary, the histologic type was not determined.  The Breslow depth could not be determined.  There was ulceration present.  The mitotic rate was greater than 1/millimeter square.  Lymphovascular invasion was present.  Tumor infiltrating lymphocytes were present but nonbrisk.  There is no tumor regression noted.  The peripheral margins, and the deep margins were negative for invasive melanoma.  Tumor was 15 mm from the closest peripheral margin and 10 mm from the closest deep margin.  Plastic surgery was consulted for wound closure with skin grafting.  Patient underwent skin grafting on 6/8/2021.  Donor site was from the right thigh.     Patient was then seeking oncologic care in [facility] in [facility].  The records from that organization are not available on Care  Everywhere.  The patient states that he was receiving treatment with the local oncologist, Dr. Arce, with about 8 pills a day he was taking twice a day.  He states that he would pick them up at his doctor's office.  He states that he had had a PET scan which is showed positive lymph nodes in his right axilla.  On records available in care everywhere on 3/3/2022, there was a CT scan of the chest, abdomen and pelvis with contrast, which was compared to a PET/CT from September 16, 2021.  This states that on the prior PET/CT from September 2021 there had been enlarged right axillary lymphadenopathy that had measured 2.6 x 2.1 cm, and on the current study from March 3, 2022 it had resolved.  There was no other evidence seen of metastatic disease.  There was a 1 cm flash enhancing lesion in the right anterior lobe of the liver which was felt to be a hemangioma.     Then on July 25, 2022, it was noted that the patient had iron deficiency anemia.  At that time, on the note from the infusion center on 9/8/2022, at which time the patient was receiving Venofer infusions, the only medication listed on his med list was tramadol.  He did receive infusion of 300 mg of Venofer x6 doses as an outpatient.  Last dose was given on 10/27/2022.     On November 7, 2022, the patient received a blood transfusion for hemoglobin of 6.2, transfusion occurred on 11/8/2022.  Patient was then admitted on 12/21/2023 and discharged on 12/23/2023 to [facility], due to secondary acute anemia from blood loss secondary to GI bleed.  At that time he had presented with several weeks of dizziness, weakness, and left lower extremity swelling.  His oncologist recommended he go to the ED for evaluation.  He did a left lower extremity Doppler which was negative for DVT.  Hemoglobin at the time was 3.2.  Patient had complained of several weeks of painless red-maroon or black-colored stools which were not often.  He denied any abdominal  pain.  He denied any nausea or vomiting.  Patient received a total of 6 units of packed red cells and underwent an EGD which revealed 5 gastric ulcers with no active bleeding.  He was placed on 3 months of PPI therapy twice daily and was recommended for repeat EGD in 2 months.  At that time, the only medication listed was metformin.   Patient was then readmitted on 1/4/2023 and discharged on 1/6/2023, due to a recurrent GI bleed.  At that time, he had reported an abrupt onset of a sensation of fatigue, diaphoresis and dizziness, and then started having dark stools.  When he presented to the ED, with hypotension and hemoglobin of 8.1.  There was concern for recurrent GI bleed, however there is no evidence of overt GI bleed.  He was transfused 2 units of packed red cells, with hemoglobin up to 9.3 on day of discharge.  It was not recommended to repeat endoscopy at the time.  He was recommended to continue with the pantoprazole twice daily.  He had also had a tagged red cell scan during the first admission which was negative.       Patient presented back to his oncologist on 01/11/2023, with a hemoglobin of 6.5.  On 1/12/2023 he was then transfused 2 units of packed red cells at the outpatient infusion center at [facility].  Subsequent hemoglobin on 1/19/2023 was 7.1.  She did have repeat iron studies on 1/23/2023, with a ferritin of 12, percent iron saturation of 4%, LDH elevated to 364, with a total bilirubin of 0.4.  He again was managed with Venofer 300 mg infusions weekly x9 weeks.  With last dose administered on 5/4/2023.     Patient then had PET/CT on 2/02/2023, which at that time showed 2 thickened hypermetabolic small bowel loops with SUV max of 11.07 and 13.72.  They have felt that these findings were suspicious for an intussusception and a CT scan of the abdomen pelvis was recommended with and without contrast.  They still noted that there was no right axillary lymphadenopathy.     Patient was then  readmitted on 5/17/2023 through 5/18/2023, again due to acute on chronic blood loss anemia and received 2 units of packed red cells.  It was suspected to be an upper GI bleed and was referred for outpatient capsule endoscopy.  At the time of presentation on 5/17/2023 he had offered a 4-day history of black stools and dizziness in the morning.  When he presented to the ED his hemoglobin was 5.7.  After transfusion of 2 units of packed red cells his hemoglobin was up to 7.1.  Last hemoglobin on file as outpatient on 5/25/2023 was 8.4 with ferritin of 11.     Presented to [facility] ER on 6/28/23 with c/o weakness, epigastric pain and melanotic stools.  Hgb 4.6.  PMH c/w DM, gastric ulcers with h/o GI bleed s/p IV iron.  He underwent an EGD which did not show any evidence of upper GI bleeding. There were 2 small superficial ulcers/erosions in the duodenal bulb which were not felt to be the cause of the profound anemia with hemoglobin of less than 5 on admission. Patient was recommended to have a CT enterography due to PET/CT on 2/2/2023 with 2 thickened hypermetabolic masses and small bowel which were felt to be secondary to intussusception. Capsule endoscopy was to be considered pending results. CT enterography was performed on 6/29/2023, which showed an 8.7 cm area of masslike thickening involving a loop of small bowel within the right lower quadrant. There was an additional 7 cm area of masslike thickening involving a loop of small bowel within the left lower quadrant. These findings were felt to be suspicious of metastatic disease to small bowel given best history of melanoma.     S/p en bloc small bowel resection and partial omentectomy ([facility]) on 6/30/23 - two separate foci of metastatic melanoma, 7.5 cm and 6.5 cm, BRAF mutant.      S/p cycle 4 Ipilimumab with 3 mg/Nivolumab 1 mg.  Tolerated very well.    Currently s/p cycle 25 single agent nivolumab now at 480 mg every 4 weeks. Tolerating well.     Fatigue:   No    Fevers:  No    Appetite/taste changes:  No    Mucositis:  No    Weight changes:  No    Nausea/vomiting:  No    Diarrhea:  No    Constipation:  No    Peripheral neuropathy:  No    Follows with his PCP.  Was not started on DM meds.  Monitoring BP.    Lives in [place], [place].    Feeling well today. Abd pain to RUQ 4-5/10 with inflammation, last month, has decreased.       ECOG PS 0    Review of Systems:   Review of Systems   Eyes:  Negative for eye problems.   Respiratory:  Negative for cough and shortness of breath.    Cardiovascular:  Negative for chest pain, leg swelling and palpitations.   Gastrointestinal:  Positive for abdominal pain (At the RUQ mild). Negative for blood in stool.        Feels the right abdomen is larger than the Left.    Endocrine:        No heat or cold intolerance   Genitourinary:  Negative for difficulty urinating, dysuria, frequency and hematuria.    Musculoskeletal:  Negative for arthralgias, back pain and neck pain.   Skin:  Negative for itching and rash.   Neurological:  Negative for dizziness, extremity weakness and headaches.   Hematological:  Negative for adenopathy. Does not bruise/bleed easily.   Psychiatric/Behavioral:  Negative for sleep disturbance.          No current outpatient medications on file.     Allergies:   No Known Allergies    Past Medical History:    Anemia    with multiple blood transfusion    GIB (gastrointestinal bleeding)    Melanoma (HCC)    right shoulder s/p surgical excision and skin graft, adjuvant BRAF/MEK inhibitors for 1.5 yrs    Personal history of antineoplastic chemotherapy     Past Surgical History:   Procedure Laterality Date    Other surgical history      small intestine surgery    Upper gi endoscopy,exam  12/01/2022     Social History     Socioeconomic History    Marital status: Single   Tobacco Use    Smoking status: Never    Smokeless tobacco: Never   Vaping Use    Vaping status: Never Used   Substance and Sexual Activity    Alcohol use: Not  Currently    Drug use: Never       No family history on file.      PHYSICAL EXAM:    /68 (BP Location: Right arm, Patient Position: Sitting, Cuff Size: large)   Pulse 61   Temp 98.4 °F (36.9 °C) (Oral)   Resp 18   Ht 1.702 m (5' 7\")   Wt 102.2 kg (225 lb 6.4 oz)   SpO2 97%   BMI 35.30 kg/m²   Wt Readings from Last 6 Encounters:   12/05/24 104.3 kg (230 lb)   11/07/24 104.8 kg (231 lb)   09/12/24 106.1 kg (234 lb)   08/15/24 103.9 kg (229 lb)   07/18/24 105.1 kg (231 lb 11.2 oz)   06/20/24 105.7 kg (233 lb)     Physical Exam  General: Patient is alert, not in acute distress.    HEENT: EOMs intact. PERRL, Anicteric.  MMM.   Neck: No JVD. No palpable lymphadenopathy. Neck is supple, surgical scar.  Chest: Clear to auscultation.  Heart: Regular rate and rhythm.   Abdomen: Soft, non tender with good bowel sounds.  Surgical scars. RUQ discomfort mild  Extremities: No edema.  Neurological: Grossly intact.   Psych/Depression: nl        ASSESSMENT/PLAN:     1. Malignant melanoma of right upper extremity including shoulder (HCC)    2. 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 Encounter for antineoplastic immunotherapy        Malignant melanoma of right upper extremity including shoulder (HCC)  Proceed with treatment with nivolumab and ipilimumab x 4 cycles followed by maintenance nivolumab for total of 18 to 24 months.  Discussed goal of treatment is still potentially curative with these agents.      S/p cycle 4 Ipilimumab 3 mg/Nivolumab 1 mg.      Tumor assessment after the fourth cycle of treatment with marked response to therapy.  Tumor assessment after cycle 10 with very minimal activity in the LN in the abdomen.  Most recent restaging study was April 11, 2024 with stable mesenteric lymph node in the right mid abdomen with mild FDG activity compatible with stable treated disease.  Also patient had nonenlarged right paratracheal node measuring 7 mm which is mildly active which was likely reactive.   This will be followed on upcoming PET/CT 2024.    S/p cycle 25 maintenance nivolumab 480mg every 4 weeks.       Proceed with cycle 26.  Given that the patient lives far, and FDA approved to the 480 mg every 4 weeks nivolumab, will switch his completion of his treatment to this dosing and schedule given his distance to the treatment center.    Restaging PET/CT on 10/24/2024 with no evidence of disease.  He has a borderline right paratracheal lymphadenopathy which is mildly hypermetabolic and is unchanged.      Reviewed with Dr Francis river R sided pain. PET scan due in Feb will move up due to new symptoms.     Called  patient back with  [facility] #[id number]  Pt to have repeat PET scan soon to evaluate new pain. Order placed and pt to call to schedule.      PET scan never scheduled, schedule prior to Jan 31 - scheduled now for 1/24/25       HTN; discussed with patient.  Had caffeine prior to visit.  He will follow with PCP if further issues..    Goal is to complete a total of 18 months to 24 months of treatment from initiation of therapy.  24 months will be in August of 2025.    No orders of the defined types were placed in this encounter.    Call prn.  Follow-up in 4 weeks    MDM: high risk    Results From Past 48 Hours:  Recent Results (from the past 48 hours)   CBC W Differential W Platelet    Collection Time: 01/02/25  7:20 AM   Result Value Ref Range    WBC 8.5 4.0 - 11.0 x10(3) uL    RBC 4.70 4.30 - 5.70 x10(6)uL    HGB 15.1 13.0 - 17.5 g/dL    HCT 44.2 39.0 - 53.0 %    MCV 94.0 80.0 - 100.0 fL    MCH 32.1 26.0 - 34.0 pg    MCHC 34.2 31.0 - 37.0 g/dL    RDW-SD 43.5 35.1 - 46.3 fL    RDW 12.5 11.0 - 15.0 %    .0 150.0 - 450.0 10(3)uL    Neutrophil Absolute Prelim 5.59 1.50 - 7.70 x10 (3) uL    Neutrophil Absolute 5.59 1.50 - 7.70 x10(3) uL    Lymphocyte Absolute 1.95 1.00 - 4.00 x10(3) uL    Monocyte Absolute 0.77 0.10 - 1.00 x10(3) uL    Eosinophil Absolute 0.06 0.00 - 0.70 x10(3) uL    Basophil Absolute  0.06 0.00 - 0.20 x10(3) uL    Immature Granulocyte Absolute 0.04 0.00 - 1.00 x10(3) uL    Neutrophil % 66.0 %    Lymphocyte % 23.0 %    Monocyte % 9.1 %    Eosinophil % 0.7 %    Basophil % 0.7 %    Immature Granulocyte % 0.5 %   Comp Metabolic Panel (14)    Collection Time: 01/02/25  7:20 AM   Result Value Ref Range    Glucose 135 (H) 70 - 99 mg/dL    Sodium 140 136 - 145 mmol/L    Potassium 4.7 3.5 - 5.1 mmol/L    Chloride 110 98 - 112 mmol/L    CO2 25.0 21.0 - 32.0 mmol/L    Anion Gap 5 0 - 18 mmol/L    BUN 19 9 - 23 mg/dL    Creatinine 0.97 0.70 - 1.30 mg/dL    BUN/CREA Ratio 19.6 10.0 - 20.0    Calcium, Total 9.4 8.7 - 10.4 mg/dL    Calculated Osmolality 294 275 - 295 mOsm/kg    eGFR-Cr 90 >=60 mL/min/1.73m2    ALT 34 10 - 49 U/L    AST 20 <34 U/L    Alkaline Phosphatase 77 45 - 117 U/L    Bilirubin, Total 0.4 0.3 - 1.2 mg/dL    Total Protein 7.3 5.7 - 8.2 g/dL    Albumin 4.6 3.2 - 4.8 g/dL    Globulin  2.7 2.0 - 3.5 g/dL    A/G Ratio 1.7 1.0 - 2.0    Patient Fasting for CMP? Patient not present    TSH W REFLEX TO FREE T4 [E]    Collection Time: 01/02/25  7:20 AM   Result Value Ref Range    TSH 2.805 0.550 - 4.780 uIU/mL     PROCEDURE: PET/CT (NON-STAND) SCANS(SKULL TO TOES) (CPT=78816)     COMPARISON: CT ENTEROGRAPHY ABD PEL W CONTRAST SH (CPT-18972), 6/29/2023, 4:15 PM.  [facility], PET (NON-STAND) SCANS(SKULL TO TOES) (CPT=78816), 8/01/2023, 7:28 AM.  CT SOFT TISSUE OF NECK (CONTRAST ONLY) (CPT=70491), 8/30/2023,   6:15 PM.  [facility], PET (NON-STAND) SCANS(SKULL TO TOES) (CPT=78816), 1/09/2024, 9:01 AM.  PET (NON-STAND) SCANS(SKULL TO TOES) (CPT=78816), 10/18/2023, 7:29 AM.  [facility], CT CHEST+ABDOMEN+PELVIS(ALL CNTRST  ONLY)(CPT=71260/51804), 7/18/2024, 8:17 AM.  [facility], PET (NON-STAND) SCANS(SKULL TO TOES) (CPT=78816), 4/11/2024, 8:27 AM.     INDICATIONS: Subsequent Treatment Strategy: 59-year-old male with  history of malignant melanoma of the right shoulder, complicated by metastatic melanoma involving 2 separate sites of the small bowel, status post en bloc resection and partial omentectomy   (06/2023), with multiple cycles of chemoimmunotherapy.     TECHNIQUE: After obtaining the patient's consent, 13.6 millicuries of F-18 FDG was administered into a  right hand vein.  Whole body PET/CT imaging was performed of the entire body, skull to feet, with multi-planar imaging without oral or intravenous  contrast material, using a dedicated integrated PET/CT scanner. Low dose non-contrast CT scanning was performed using a dedicated integrated PET/CT scanner for attenuation correction and anatomic localization only.     PATIENT BLOOD GLUCOSE: 156 mg/dL.    UPTAKE TIME:  65 minutes     FINDINGS:  COMMENT: Attenuation corrected and non-attenuation corrected images were reviewed from the head to the feet.  REFERENCE VALUES: The SUV max of the mediastinal blood pool is 3.0. The SUV max of the liver is 4.3.  HEAD/NECK: Mild metallic streak artifact from dental amalgam is appreciated. No pathologic FDG activity.    LUNGS/PLEURA: A few scattered pulmonary micronodules are re-identified, but remain too small for scintigraphic evaluation. There is dependent subsegmental atelectasis bilaterally. Additional scattered ground-glass and reticular opacities are present and  may be atelectatic in origin. No pathologic FDG activity. No FDG avid pulmonary nodules.    MEDIASTINUM/KIERSTEN: A 1.1 x 2.1 cm right paratracheal lymph node (series 3, image 193) is seen with SUV max of 4.2, unchanged.  The thoracic aorta has normal-variant two-vessel configuration with a shared origin of the brachiocephalic trunk and left common carotid artery.  CHEST WALL/AXILLA: No pathologic FDG activity.    ABDOMEN/PELVIS: No suspicious mesenteric hypermetabolic lymph nodes are appreciated on the current study. A small hiatal hernia is evident. Postoperative changes  of partial small-bowel resection are demonstrated with enteroenteric anastomosis in the  right upper quadrant. There has been decrease in perianastomotic activity with SUV max of 4.5 currently.  The prostate is enlarged, measuring 5.9 cm transversely. There is no visible intraluminal bladder calculus. There is circumferential bladder wall thickening out of proportion to the degree of distention.  MUSCULOSKELETAL: Mild scoliosis and multilevel degenerative changes of the spine are appreciated. Degenerative changes of the hips are present bilaterally. There are degenerative changes of the knees. No pathologic FDG activity. No suspicious lesions on  CT imaging.    OTHER: Generalized laxity of the ventral abdominal wall is demonstrated. Areas of christianne-incisional fat protrusion are perceived. Small bilateral fat-containing inguinal hernias are present. There are suspected bilateral hydroceles.                   Impression  CONCLUSION:  1. Stable borderline right paratracheal lymphadenopathy with unchanged mild hypermetabolic activity.     2. Postoperative changes of partial bowel resection without discernible suspicious hypermetabolic activity.       3. Prostatomegaly with chronic outlet obstruction.     4. Lesser incidental findings as above.           elm-remote.        Dictated by (CST): Pool Rivera MD on 10/24/2024 at 5:45 PM      Finalized by (CST): Pool Rivera MD on 10/24/2024 at 6:01 PM

## 2025-01-24 ENCOUNTER — HOSPITAL ENCOUNTER (OUTPATIENT)
Dept: NUCLEAR MEDICINE | Facility: HOSPITAL | Age: 60
Discharge: HOME | End: 2025-01-24
Attending: NURSE PRACTITIONER

## 2025-01-24 DIAGNOSIS — C78.89 MELANOMA METASTATIC TO DIGESTIVE ORGAN (HCC): ICD-10-CM

## 2025-01-24 DIAGNOSIS — C43.61 MALIGNANT MELANOMA OF RIGHT UPPER EXTREMITY INCLUDING SHOULDER (HCC): ICD-10-CM

## 2025-01-24 LAB — GLUCOSE BLDC GLUCOMTR-MCNC: 152 MG/DL (ref 70–99)

## 2025-01-24 PROCEDURE — 78816 PET IMAGE W/CT FULL BODY: CPT | Performed by: NURSE PRACTITIONER

## 2025-01-24 PROCEDURE — 82962 GLUCOSE BLOOD TEST: CPT

## 2025-01-29 NOTE — PROGRESS NOTES
Pt here for C27 Opdivo.  Arrives Ambulating independently, accompanied by Self     Patient was evaluated today by [facility].  Oral medications included in this regimen:  no  Patient confirms comprehension of cancer treatment schedule:  yes  Pregnancy screening:  Not applicable    Modifications in dose or schedule:  No    Medications appearance and physical integrity checked by RN: yes. Chemotherapy IV pump settings verified by 2 RNs:  No due to targeted therapy IV administration.    Frequency of blood return and site check throughout administration: Prior to administration and At completion of therapy     Infusion/treatment outcome:  patient tolerated treatment without incident    Education Record    Learner:  Patient  Barriers / Limitations:  None  Method:  Reinforcement  Education / instructions given:  Plan of care   Outcome:  Shows understanding    Discharged Home, Ambulating independently. Patient/family verbalized understanding of future appointments: by MyChart messaging

## 2025-01-30 ENCOUNTER — OFFICE VISIT (OUTPATIENT)
Age: 60
End: 2025-01-30
Attending: INTERNAL MEDICINE

## 2025-01-30 ENCOUNTER — NURSE ONLY (OUTPATIENT)
Age: 60
End: 2025-01-30
Attending: INTERNAL MEDICINE

## 2025-01-30 ENCOUNTER — OFFICE VISIT (OUTPATIENT)
Age: 60
End: 2025-01-30
Attending: NURSE PRACTITIONER

## 2025-01-30 VITALS
OXYGEN SATURATION: 97 % | WEIGHT: 232 LBS | RESPIRATION RATE: 18 BRPM | HEART RATE: 60 BPM | DIASTOLIC BLOOD PRESSURE: 80 MMHG | TEMPERATURE: 98 F | BODY MASS INDEX: 36.41 KG/M2 | HEIGHT: 67 IN | SYSTOLIC BLOOD PRESSURE: 128 MMHG

## 2025-01-30 DIAGNOSIS — C78.89 MELANOMA METASTATIC TO DIGESTIVE ORGAN (HCC): ICD-10-CM

## 2025-01-30 DIAGNOSIS — C43.61 MALIGNANT MELANOMA OF RIGHT UPPER EXTREMITY INCLUDING SHOULDER (HCC): Primary | ICD-10-CM

## 2025-01-30 DIAGNOSIS — Z51.12 ENCOUNTER FOR ANTINEOPLASTIC IMMUNOTHERAPY: ICD-10-CM

## 2025-01-30 LAB
ALBUMIN SERPL-MCNC: 4.7 G/DL (ref 3.2–4.8)
ALBUMIN/GLOB SERPL: 1.7 {RATIO} (ref 1–2)
ALP LIVER SERPL-CCNC: 80 U/L
ALT SERPL-CCNC: 27 U/L
ANION GAP SERPL CALC-SCNC: 10 MMOL/L (ref 0–18)
AST SERPL-CCNC: 15 U/L (ref ?–34)
BASOPHILS # BLD AUTO: 0.08 X10(3) UL (ref 0–0.2)
BASOPHILS NFR BLD AUTO: 1.5 %
BILIRUB SERPL-MCNC: 0.4 MG/DL (ref 0.3–1.2)
BUN BLD-MCNC: 22 MG/DL (ref 9–23)
CALCIUM BLD-MCNC: 9.6 MG/DL (ref 8.7–10.4)
CHLORIDE SERPL-SCNC: 105 MMOL/L (ref 98–112)
CO2 SERPL-SCNC: 23 MMOL/L (ref 21–32)
CREAT BLD-MCNC: 0.87 MG/DL
DEPRECATED RDW RBC AUTO: 43.6 FL (ref 35.1–46.3)
EGFRCR SERPLBLD CKD-EPI 2021: 99 ML/MIN/1.73M2 (ref 60–?)
EOSINOPHIL # BLD AUTO: 0.06 X10(3) UL (ref 0–0.7)
EOSINOPHIL NFR BLD AUTO: 1.1 %
ERYTHROCYTE [DISTWIDTH] IN BLOOD BY AUTOMATED COUNT: 12.8 % (ref 11–15)
GLOBULIN PLAS-MCNC: 2.8 G/DL (ref 2–3.5)
GLUCOSE BLD-MCNC: 216 MG/DL (ref 70–99)
HCT VFR BLD AUTO: 43 %
HGB BLD-MCNC: 15.1 G/DL
IMM GRANULOCYTES # BLD AUTO: 0.02 X10(3) UL (ref 0–1)
IMM GRANULOCYTES NFR BLD: 0.4 %
LYMPHOCYTES # BLD AUTO: 1.86 X10(3) UL (ref 1–4)
LYMPHOCYTES NFR BLD AUTO: 35.1 %
MCH RBC QN AUTO: 32.4 PG (ref 26–34)
MCHC RBC AUTO-ENTMCNC: 35.1 G/DL (ref 31–37)
MCV RBC AUTO: 92.3 FL
MONOCYTES # BLD AUTO: 0.6 X10(3) UL (ref 0.1–1)
MONOCYTES NFR BLD AUTO: 11.3 %
NEUTROPHILS # BLD AUTO: 2.68 X10 (3) UL (ref 1.5–7.7)
NEUTROPHILS # BLD AUTO: 2.68 X10(3) UL (ref 1.5–7.7)
NEUTROPHILS NFR BLD AUTO: 50.6 %
PLATELET # BLD AUTO: 236 10(3)UL (ref 150–450)
POTASSIUM SERPL-SCNC: 4.3 MMOL/L (ref 3.5–5.1)
PROT SERPL-MCNC: 7.5 G/DL (ref 5.7–8.2)
RBC # BLD AUTO: 4.66 X10(6)UL
SODIUM SERPL-SCNC: 138 MMOL/L (ref 136–145)
TSI SER-ACNC: 3.33 UIU/ML (ref 0.55–4.78)
WBC # BLD AUTO: 5.3 X10(3) UL (ref 4–11)

## 2025-01-30 NOTE — PROGRESS NOTES
HPI   Cole Kendrick is a 59 year old male here for follow up of   Encounter Diagnoses   Name Primary?    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    Oncology history:   Extensive outside records from multiple facilities since March 2021 were reviewed.  This is a synopsis of those records, details can be found on Care Everywhere.  Patient had presented at [facility] in [facility] March 2021 due to a mass of the right shoulder.  At that time he underwent imaging with an MRI which showed a superficial right upper arm mass which appeared to be veins within the dermis.  It measured 3.9 cm x 8.3 cm x 5.9 cm it was felt that there was likely internal process of hemorrhage within the mass.  The mass demonstrated significant enhancement and it extended to the involved adjacent thickened areas of the dermis.  This mass was abutting the deltoid muscle and mildly effacing the deltoid muscle but not invading it.  There is also evidence of 13 mm short axis diameter right axillary lymph node which appeared to have a thickened cortex and was felt to be abnormal.  Dedicated ultrasound of the right axilla was recommended for further evaluation.  There were other smaller lymph nodes adjacent to the area.  Patient subsequently underwent right axillary lymph node ultrasound guided biopsy, the area contained multiple and large lymph nodes which were measuring between 3.5 and 3.2 cm.  There is at least 3 of them.  Pathology report showed lymph node tissue showing features consistent with nonspecific reactive lymphoid hyperplasia, there is no evidence of metastatic disease, nor a lymphoproliferative process or malignancy.     Patient was then referred to cancer center at [facility] in [facility], and was evaluated by her surgical oncologist Dr. Fabián Dawn.  Per review of his  consultation report, the patient had stated he had noted a new (mole) on the right arm 9 months prior to his original presentation and had enlarged over several months and began to bleed.  It was not painful and was not causing any neuromuscular issues.  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  We did discuss causes the results of the MRI above.  I was discussed for the patient to proceed with a radical resection of the right upper arm mass with placement of the wound VAC after performing the wide excision of the mass.  Surgical resection was performed on 5/26/2021 with en bloc resection of 12 cm with partial resection of the underlying right deltoid muscle.  Final wound measured 15 cm in diameter by 4 cm deep, and a wound VAC in place.  The pathology report showed that this was consistent with a malignant melanoma that measured 10.5 cm and was focally in the epidermis abutting ulcer with minute focus of possible melanoma in situ.  BRAF V600E was positive.  As the tumor staging summary, the histologic type was not determined.  The Breslow depth could not be determined.  There was ulceration present.  The mitotic rate was greater than 1/millimeter square.  Lymphovascular invasion was present.  Tumor infiltrating lymphocytes were present but nonbrisk.  There is no tumor regression noted.  The peripheral margins, and the deep margins were negative for invasive melanoma.  Tumor was 15 mm from the closest peripheral margin and 10 mm from the closest deep margin.  Plastic surgery was consulted for wound closure with skin grafting.  Patient underwent skin grafting on 6/8/2021.  Donor site was from the right thigh.     Patient was then seeking oncologic care in [facility] in [facility].  The records from that organization are not available on Care  Everywhere.  The patient states that he was receiving treatment with the local oncologist, Dr. Arce, with about 8 pills a day he was taking twice a day.  He states that he would pick them up at his doctor's office.  He states that he had had a PET scan which is showed positive lymph nodes in his right axilla.  On records available in care everywhere on 3/3/2022, there was a CT scan of the chest, abdomen and pelvis with contrast, which was compared to a PET/CT from September 16, 2021.  This states that on the prior PET/CT from September 2021 there had been enlarged right axillary lymphadenopathy that had measured 2.6 x 2.1 cm, and on the current study from March 3, 2022 it had resolved.  There was no other evidence seen of metastatic disease.  There was a 1 cm flash enhancing lesion in the right anterior lobe of the liver which was felt to be a hemangioma.     Then on July 25, 2022, it was noted that the patient had iron deficiency anemia.  At that time, on the note from the infusion center on 9/8/2022, at which time the patient was receiving Venofer infusions, the only medication listed on his med list was tramadol.  He did receive infusion of 300 mg of Venofer x6 doses as an outpatient.  Last dose was given on 10/27/2022.     On November 7, 2022, the patient received a blood transfusion for hemoglobin of 6.2, transfusion occurred on 11/8/2022.  Patient was then admitted on 12/21/2023 and discharged on 12/23/2023 to [facility], due to secondary acute anemia from blood loss secondary to GI bleed.  At that time he had presented with several weeks of dizziness, weakness, and left lower extremity swelling.  His oncologist recommended he go to the ED for evaluation.  He did a left lower extremity Doppler which was negative for DVT.  Hemoglobin at the time was 3.2.  Patient had complained of several weeks of painless red-maroon or black-colored stools which were not often.  He denied any abdominal  pain.  He denied any nausea or vomiting.  Patient received a total of 6 units of packed red cells and underwent an EGD which revealed 5 gastric ulcers with no active bleeding.  He was placed on 3 months of PPI therapy twice daily and was recommended for repeat EGD in 2 months.  At that time, the only medication listed was metformin.   Patient was then readmitted on 1/4/2023 and discharged on 1/6/2023, due to a recurrent GI bleed.  At that time, he had reported an abrupt onset of a sensation of fatigue, diaphoresis and dizziness, and then started having dark stools.  When he presented to the ED, with hypotension and hemoglobin of 8.1.  There was concern for recurrent GI bleed, however there is no evidence of overt GI bleed.  He was transfused 2 units of packed red cells, with hemoglobin up to 9.3 on day of discharge.  It was not recommended to repeat endoscopy at the time.  He was recommended to continue with the pantoprazole twice daily.  He had also had a tagged red cell scan during the first admission which was negative.       Patient presented back to his oncologist on 01/11/2023, with a hemoglobin of 6.5.  On 1/12/2023 he was then transfused 2 units of packed red cells at the outpatient infusion center at [facility].  Subsequent hemoglobin on 1/19/2023 was 7.1.  She did have repeat iron studies on 1/23/2023, with a ferritin of 12, percent iron saturation of 4%, LDH elevated to 364, with a total bilirubin of 0.4.  He again was managed with Venofer 300 mg infusions weekly x9 weeks.  With last dose administered on 5/4/2023.     Patient then had PET/CT on 2/02/2023, which at that time showed 2 thickened hypermetabolic small bowel loops with SUV max of 11.07 and 13.72.  They have felt that these findings were suspicious for an intussusception and a CT scan of the abdomen pelvis was recommended with and without contrast.  They still noted that there was no right axillary lymphadenopathy.     Patient was then  readmitted on 5/17/2023 through 5/18/2023, again due to acute on chronic blood loss anemia and received 2 units of packed red cells.  It was suspected to be an upper GI bleed and was referred for outpatient capsule endoscopy.  At the time of presentation on 5/17/2023 he had offered a 4-day history of black stools and dizziness in the morning.  When he presented to the ED his hemoglobin was 5.7.  After transfusion of 2 units of packed red cells his hemoglobin was up to 7.1.  Last hemoglobin on file as outpatient on 5/25/2023 was 8.4 with ferritin of 11.     Presented to [facility] ER on 6/28/23 with c/o weakness, epigastric pain and melanotic stools.  Hgb 4.6.  PMH c/w DM, gastric ulcers with h/o GI bleed s/p IV iron.  He underwent an EGD which did not show any evidence of upper GI bleeding. There were 2 small superficial ulcers/erosions in the duodenal bulb which were not felt to be the cause of the profound anemia with hemoglobin of less than 5 on admission. Patient was recommended to have a CT enterography due to PET/CT on 2/2/2023 with 2 thickened hypermetabolic masses and small bowel which were felt to be secondary to intussusception. Capsule endoscopy was to be considered pending results. CT enterography was performed on 6/29/2023, which showed an 8.7 cm area of masslike thickening involving a loop of small bowel within the right lower quadrant. There was an additional 7 cm area of masslike thickening involving a loop of small bowel within the left lower quadrant. These findings were felt to be suspicious of metastatic disease to small bowel given best history of melanoma.     S/p en bloc small bowel resection and partial omentectomy ([facility]) on 6/30/23 - two separate foci of metastatic melanoma, 7.5 cm and 6.5 cm, BRAF mutant.      S/p cycle 4 Ipilimumab with 3 mg/Nivolumab 1 mg.  Tolerated very well.    Currently s/p cycle 26 single agent nivolumab now at 480 mg every 4 weeks. Tolerating well.     Fatigue:   No    Fevers:  No    Appetite/taste changes:  No    Mucositis:  No    Weight changes:  No    Nausea/vomiting:  No    Diarrhea:  No    Constipation:  No    Peripheral neuropathy:  No    Follows with his PCP.  Was not started on DM meds.  Monitoring BP.    Lives in [place], [place].    Feeling well today. Abd pain to RUQ 4-5/10 with inflammation, last month, has decreased.   No complaint today     ECOG PS 0    Review of Systems:   Review of Systems   Eyes:  Negative for eye problems.   Respiratory:  Negative for cough and shortness of breath.    Cardiovascular:  Negative for chest pain, leg swelling and palpitations.   Gastrointestinal:  Positive for abdominal pain (At the RUQ mild). Negative for blood in stool.        Feels the right abdomen is larger than the Left.    Endocrine:        No heat or cold intolerance   Genitourinary:  Negative for difficulty urinating, dysuria, frequency and hematuria.    Musculoskeletal:  Negative for arthralgias, back pain and neck pain.   Skin:  Negative for itching and rash.   Neurological:  Negative for dizziness, extremity weakness and headaches.   Hematological:  Negative for adenopathy. Does not bruise/bleed easily.   Psychiatric/Behavioral:  Negative for sleep disturbance.          No current outpatient medications on file.     Allergies:   No Known Allergies    Past Medical History:    Anemia    with multiple blood transfusion    GIB (gastrointestinal bleeding)    Melanoma (HCC)    right shoulder s/p surgical excision and skin graft, adjuvant BRAF/MEK inhibitors for 1.5 yrs    Personal history of antineoplastic chemotherapy     Past Surgical History:   Procedure Laterality Date    Other surgical history      small intestine surgery    Upper gi endoscopy,exam  12/01/2022     Social History     Socioeconomic History    Marital status: Single   Tobacco Use    Smoking status: Never    Smokeless tobacco: Never   Vaping Use    Vaping status: Never Used   Substance and Sexual Activity     Alcohol use: Not Currently    Drug use: Never       No family history on file.      PHYSICAL EXAM:    /80 (BP Location: Left arm, Patient Position: Sitting, Cuff Size: large)   Pulse 60   Temp 97.8 °F (36.6 °C) (Tympanic)   Resp 18   Ht 1.702 m (5' 7\")   Wt 105.2 kg (232 lb)   SpO2 97%   BMI 36.34 kg/m²   Wt Readings from Last 6 Encounters:   01/02/25 102.2 kg (225 lb 6.4 oz)   12/05/24 104.3 kg (230 lb)   11/07/24 104.8 kg (231 lb)   09/12/24 106.1 kg (234 lb)   08/15/24 103.9 kg (229 lb)   07/18/24 105.1 kg (231 lb 11.2 oz)     Physical Exam  General: Patient is alert, not in acute distress.    HEENT: EOMs intact. PERRL, Anicteric.  MMM.   Neck: No JVD. No palpable lymphadenopathy. Neck is supple, surgical scar.  Chest: Clear to auscultation.  Heart: Regular rate and rhythm.   Abdomen: Soft, non tender with good bowel sounds.  Surgical scars. RUQ discomfort mild  Extremities: No edema.  Neurological: Grossly intact.   Psych/Depression: nl        ASSESSMENT/PLAN:     1. Malignant melanoma of right upper extremity including shoulder (HCC)    2. 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 Encounter for antineoplastic immunotherapy        Malignant melanoma of right upper extremity including shoulder (HCC)  Proceed with treatment with nivolumab and ipilimumab x 4 cycles followed by maintenance nivolumab for total of 18 to 24 months.  Discussed goal of treatment is still potentially curative with these agents.      S/p cycle 4 Ipilimumab 3 mg/Nivolumab 1 mg.      Tumor assessment after the fourth cycle of treatment with marked response to therapy.  Tumor assessment after cycle 10 with very minimal activity in the LN in the abdomen.  Most recent restaging study was April 11, 2024 with stable mesenteric lymph node in the right mid abdomen with mild FDG activity compatible with stable treated disease.  Also patient had nonenlarged right paratracheal node measuring 7 mm which is mildly active which was  likely reactive.  This will be followed on upcoming PET/CT 2024.    S/p cycle 26 maintenance nivolumab 480mg every 4 weeks.       Proceed with cycle 27.  Given that the patient lives far, and FDA approved to the 480 mg every 4 weeks nivolumab, will switch his completion of his treatment to this dosing and schedule given his distance to the treatment center.    Restaging PET/CT on 10/24/2024 with no evidence of disease.  He has a borderline right paratracheal lymphadenopathy which is mildly hypermetabolic and is unchanged.      Reviewed PET scan stable Jan 2025.        HTN; discussed with patient.  Had caffeine prior to visit.  He will follow with PCP if further issues..    Goal is to complete a total of 18 months to 24 months of treatment from initiation of therapy.  24 months will be in August of 2025.    No orders of the defined types were placed in this encounter.    Call prn.  Follow-up in 4 weeks    MDM: high risk    Results From Past 48 Hours:  Recent Results (from the past 48 hours)   Comp Metabolic Panel (14)    Collection Time: 01/30/25  7:27 AM   Result Value Ref Range    Glucose 216 (H) 70 - 99 mg/dL    Sodium 138 136 - 145 mmol/L    Potassium      Chloride 105 98 - 112 mmol/L    CO2 23.0 21.0 - 32.0 mmol/L    Anion Gap 10 0 - 18 mmol/L    BUN      Creatinine 0.87 0.70 - 1.30 mg/dL    BUN/CREA Ratio      Calcium, Total 9.6 8.7 - 10.4 mg/dL    eGFR-Cr 99 >=60 mL/min/1.73m2    ALT      AST      Alkaline Phosphatase 80 45 - 117 U/L    Bilirubin, Total 0.4 0.3 - 1.2 mg/dL    Total Protein 7.5 5.7 - 8.2 g/dL    Albumin 4.7 3.2 - 4.8 g/dL    Globulin  2.8 2.0 - 3.5 g/dL    A/G Ratio 1.7 1.0 - 2.0    Patient Fasting for CMP? Patient not present    TSH W REFLEX TO FREE T4 [E]    Collection Time: 01/30/25  7:27 AM   Result Value Ref Range    TSH 3.329 0.550 - 4.780 uIU/mL       PROCEDURE: PET/CT (NON-STAND) SCANS(SKULL TO TOES) (CPT=78816)     COMPARISON: [facility], PET (NON-STAND) SCANS(SKULL TO  TOES) (CPT=78816), 10/24/2024, 8:01 AM.     INDICATIONS: C78.89 Melanoma metastatic to digestive organ (HCC) C43.61 Malignant melanoma of right upper extremity including shoulder (HCC)     TECHNIQUE: After obtaining the patient's consent, 13.8 millicuries of F-18 FDG was administered into the left antecubital vein.  Whole body PET/CT imaging was performed of the entire body, skull to feet, with multi-planar imaging without oral or  intravenous contrast material, using a dedicated integrated PET/CT scanner.       PATIENT BLOOD GLUCOSE: 152 mg/dL.    UPTAKE TIME:  66 minutes     FINDINGS:  Attenuation corrected and non-attenuation corrected images were reviewed from the head to the feet.     Mediastinal blood pool is 2.89 max SUV, and hepatic blood pool is 4.45 max SUV.     HEAD/NECK: No pathologic FDG activity.  LUNGS: No pathologic FDG activity.  MEDIASTINUM/KIERSTEN: Focal metabolic activity is noted within the right paratracheal lymph node (fused axial image 472) with maximum SUV of 4.18, greater than the mediastinal blood pool, but less than the right hepatic lobe, previously 4.23.  It measures  1.9 x 1.1 cm, stable.  CHEST WALL/AXILLA: No pathologic FDG activity.  ABDOMEN/ PELVIS: No pathologic FDG activity.  MUSCULOSKELETAL: No pathologic FDG activity.  LOWER EXTREMITIES: No pathologic FDG activity.     OTHER CT FINDINGS: Dental fillings are seen.  Mild calcified atherosclerosis.  Coronary artery calcifications are not seen.  Mild subsegmental atelectasis.  No suspicious pulmonary nodules.  Low-attenuation of the liver may indicate hepatic steatosis.    Status post small bowel resection and anastomosis.  The appendix is unremarkable.  Bilateral fat containing inguinal hernias.  Diffuse thickening of the urinary bladder wall.  Prostatomegaly.  Varicose veins in the bilateral lower extremities.  Mild  spondylosis.               Impression   CONCLUSION:     1. Stable right paratracheal lymph node with mild metabolic  activity, greater than that of the mediastinal blood pool, but less than that of the right hepatic lobe.     2. No new sites of pathologic FDG activity.     3. Prostatomegaly.  Diffuse urinary bladder thickening may indicate chronic bladder outlet obstruction but cystitis is not excluded.  Consider correlation with urinalysis.           Dictated by (CST): Mando Gaines MD on 1/27/2025 at 7:19 AM      Finalized by (CST): Mando Gaines MD on 1/27/2025 at 7:30 AM

## 2025-02-17 ENCOUNTER — TELEPHONE (OUTPATIENT)
Age: 60
End: 2025-02-17

## 2025-02-17 NOTE — TELEPHONE ENCOUNTER
Tried contacting patient for updated insurance no answer and his relative Milka does not have that information. Informed her to have patient contact office.

## 2025-02-27 ENCOUNTER — OFFICE VISIT (OUTPATIENT)
Age: 60
End: 2025-02-27
Attending: INTERNAL MEDICINE

## 2025-02-27 ENCOUNTER — NURSE ONLY (OUTPATIENT)
Age: 60
End: 2025-02-27
Attending: INTERNAL MEDICINE

## 2025-02-27 VITALS
OXYGEN SATURATION: 97 % | HEIGHT: 67 IN | DIASTOLIC BLOOD PRESSURE: 73 MMHG | RESPIRATION RATE: 18 BRPM | TEMPERATURE: 98 F | HEART RATE: 67 BPM | WEIGHT: 239 LBS | SYSTOLIC BLOOD PRESSURE: 130 MMHG | BODY MASS INDEX: 37.51 KG/M2

## 2025-02-27 DIAGNOSIS — C43.61 MALIGNANT MELANOMA OF RIGHT UPPER EXTREMITY INCLUDING SHOULDER (HCC): Primary | ICD-10-CM

## 2025-02-27 DIAGNOSIS — C78.89 MELANOMA METASTATIC TO DIGESTIVE ORGAN (HCC): ICD-10-CM

## 2025-02-27 DIAGNOSIS — Z51.12 ENCOUNTER FOR ANTINEOPLASTIC IMMUNOTHERAPY: ICD-10-CM

## 2025-02-27 LAB
ALBUMIN SERPL-MCNC: 4.3 G/DL (ref 3.2–4.8)
ALBUMIN/GLOB SERPL: 1.8 {RATIO} (ref 1–2)
ALP LIVER SERPL-CCNC: 85 U/L
ALT SERPL-CCNC: 31 U/L
ANION GAP SERPL CALC-SCNC: 9 MMOL/L (ref 0–18)
AST SERPL-CCNC: 24 U/L (ref ?–34)
BASOPHILS # BLD AUTO: 0.08 X10(3) UL (ref 0–0.2)
BASOPHILS NFR BLD AUTO: 1.7 %
BILIRUB SERPL-MCNC: 0.4 MG/DL (ref 0.3–1.2)
BUN BLD-MCNC: 17 MG/DL (ref 9–23)
BUN/CREAT SERPL: 16.8 (ref 10–20)
CALCIUM BLD-MCNC: 8.8 MG/DL (ref 8.7–10.4)
CHLORIDE SERPL-SCNC: 104 MMOL/L (ref 98–112)
CO2 SERPL-SCNC: 25 MMOL/L (ref 21–32)
CREAT BLD-MCNC: 1.01 MG/DL
DEPRECATED RDW RBC AUTO: 44.4 FL (ref 35.1–46.3)
EGFRCR SERPLBLD CKD-EPI 2021: 86 ML/MIN/1.73M2 (ref 60–?)
EOSINOPHIL # BLD AUTO: 0.06 X10(3) UL (ref 0–0.7)
EOSINOPHIL NFR BLD AUTO: 1.3 %
ERYTHROCYTE [DISTWIDTH] IN BLOOD BY AUTOMATED COUNT: 13 % (ref 11–15)
GLOBULIN PLAS-MCNC: 2.4 G/DL (ref 2–3.5)
GLUCOSE BLD-MCNC: 298 MG/DL (ref 70–99)
HCT VFR BLD AUTO: 43.3 %
HGB BLD-MCNC: 14.9 G/DL
IMM GRANULOCYTES # BLD AUTO: 0.02 X10(3) UL (ref 0–1)
IMM GRANULOCYTES NFR BLD: 0.4 %
LYMPHOCYTES # BLD AUTO: 1.56 X10(3) UL (ref 1–4)
LYMPHOCYTES NFR BLD AUTO: 32.6 %
MCH RBC QN AUTO: 32.1 PG (ref 26–34)
MCHC RBC AUTO-ENTMCNC: 34.4 G/DL (ref 31–37)
MCV RBC AUTO: 93.3 FL
MONOCYTES # BLD AUTO: 0.43 X10(3) UL (ref 0.1–1)
MONOCYTES NFR BLD AUTO: 9 %
NEUTROPHILS # BLD AUTO: 2.63 X10 (3) UL (ref 1.5–7.7)
NEUTROPHILS # BLD AUTO: 2.63 X10(3) UL (ref 1.5–7.7)
NEUTROPHILS NFR BLD AUTO: 55 %
OSMOLALITY SERPL CALC.SUM OF ELEC: 299 MOSM/KG (ref 275–295)
PLATELET # BLD AUTO: 208 10(3)UL (ref 150–450)
POTASSIUM SERPL-SCNC: 4.4 MMOL/L (ref 3.5–5.1)
PROT SERPL-MCNC: 6.7 G/DL (ref 5.7–8.2)
RBC # BLD AUTO: 4.64 X10(6)UL
SODIUM SERPL-SCNC: 138 MMOL/L (ref 136–145)
TSI SER-ACNC: 1.67 UIU/ML (ref 0.55–4.78)
WBC # BLD AUTO: 4.8 X10(3) UL (ref 4–11)

## 2025-02-27 NOTE — PROGRESS NOTES
HPI   Cole Kendrick is a 59 year old male here for follow up of   Encounter Diagnoses   Name Primary?    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    Oncology history:   Extensive outside records from multiple facilities since March 2021 were reviewed.  This is a synopsis of those records, details can be found on Care Everywhere.  Patient had presented at [facility] in [facility] March 2021 due to a mass of the right shoulder.  At that time he underwent imaging with an MRI which showed a superficial right upper arm mass which appeared to be veins within the dermis.  It measured 3.9 cm x 8.3 cm x 5.9 cm it was felt that there was likely internal process of hemorrhage within the mass.  The mass demonstrated significant enhancement and it extended to the involved adjacent thickened areas of the dermis.  This mass was abutting the deltoid muscle and mildly effacing the deltoid muscle but not invading it.  There is also evidence of 13 mm short axis diameter right axillary lymph node which appeared to have a thickened cortex and was felt to be abnormal.  Dedicated ultrasound of the right axilla was recommended for further evaluation.  There were other smaller lymph nodes adjacent to the area.  Patient subsequently underwent right axillary lymph node ultrasound guided biopsy, the area contained multiple and large lymph nodes which were measuring between 3.5 and 3.2 cm.  There is at least 3 of them.  Pathology report showed lymph node tissue showing features consistent with nonspecific reactive lymphoid hyperplasia, there is no evidence of metastatic disease, nor a lymphoproliferative process or malignancy.     Patient was then referred to cancer center at [facility] in [facility], and was evaluated by her surgical oncologist Dr. Fabián Dawn.  Per review of his  consultation report, the patient had stated he had noted a new (mole) on the right arm 9 months prior to his original presentation and had enlarged over several months and began to bleed.  It was not painful and was not causing any neuromuscular issues.  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  We did discuss causes the results of the MRI above.  I was discussed for the patient to proceed with a radical resection of the right upper arm mass with placement of the wound VAC after performing the wide excision of the mass.  Surgical resection was performed on 5/26/2021 with en bloc resection of 12 cm with partial resection of the underlying right deltoid muscle.  Final wound measured 15 cm in diameter by 4 cm deep, and a wound VAC in place.  The pathology report showed that this was consistent with a malignant melanoma that measured 10.5 cm and was focally in the epidermis abutting ulcer with minute focus of possible melanoma in situ.  BRAF V600E was positive.  As the tumor staging summary, the histologic type was not determined.  The Breslow depth could not be determined.  There was ulceration present.  The mitotic rate was greater than 1/millimeter square.  Lymphovascular invasion was present.  Tumor infiltrating lymphocytes were present but nonbrisk.  There is no tumor regression noted.  The peripheral margins, and the deep margins were negative for invasive melanoma.  Tumor was 15 mm from the closest peripheral margin and 10 mm from the closest deep margin.  Plastic surgery was consulted for wound closure with skin grafting.  Patient underwent skin grafting on 6/8/2021.  Donor site was from the right thigh.     Patient was then seeking oncologic care in [facility] in [facility].  The records from that organization are not available on Care  Everywhere.  The patient states that he was receiving treatment with the local oncologist, Dr. Arce, with about 8 pills a day he was taking twice a day.  He states that he would pick them up at his doctor's office.  He states that he had had a PET scan which is showed positive lymph nodes in his right axilla.  On records available in care everywhere on 3/3/2022, there was a CT scan of the chest, abdomen and pelvis with contrast, which was compared to a PET/CT from September 16, 2021.  This states that on the prior PET/CT from September 2021 there had been enlarged right axillary lymphadenopathy that had measured 2.6 x 2.1 cm, and on the current study from March 3, 2022 it had resolved.  There was no other evidence seen of metastatic disease.  There was a 1 cm flash enhancing lesion in the right anterior lobe of the liver which was felt to be a hemangioma.     Then on July 25, 2022, it was noted that the patient had iron deficiency anemia.  At that time, on the note from the infusion center on 9/8/2022, at which time the patient was receiving Venofer infusions, the only medication listed on his med list was tramadol.  He did receive infusion of 300 mg of Venofer x6 doses as an outpatient.  Last dose was given on 10/27/2022.     On November 7, 2022, the patient received a blood transfusion for hemoglobin of 6.2, transfusion occurred on 11/8/2022.  Patient was then admitted on 12/21/2023 and discharged on 12/23/2023 to [facility], due to secondary acute anemia from blood loss secondary to GI bleed.  At that time he had presented with several weeks of dizziness, weakness, and left lower extremity swelling.  His oncologist recommended he go to the ED for evaluation.  He did a left lower extremity Doppler which was negative for DVT.  Hemoglobin at the time was 3.2.  Patient had complained of several weeks of painless red-maroon or black-colored stools which were not often.  He denied any abdominal  pain.  He denied any nausea or vomiting.  Patient received a total of 6 units of packed red cells and underwent an EGD which revealed 5 gastric ulcers with no active bleeding.  He was placed on 3 months of PPI therapy twice daily and was recommended for repeat EGD in 2 months.  At that time, the only medication listed was metformin.   Patient was then readmitted on 1/4/2023 and discharged on 1/6/2023, due to a recurrent GI bleed.  At that time, he had reported an abrupt onset of a sensation of fatigue, diaphoresis and dizziness, and then started having dark stools.  When he presented to the ED, with hypotension and hemoglobin of 8.1.  There was concern for recurrent GI bleed, however there is no evidence of overt GI bleed.  He was transfused 2 units of packed red cells, with hemoglobin up to 9.3 on day of discharge.  It was not recommended to repeat endoscopy at the time.  He was recommended to continue with the pantoprazole twice daily.  He had also had a tagged red cell scan during the first admission which was negative.       Patient presented back to his oncologist on 01/11/2023, with a hemoglobin of 6.5.  On 1/12/2023 he was then transfused 2 units of packed red cells at the outpatient infusion center at [facility].  Subsequent hemoglobin on 1/19/2023 was 7.1.  She did have repeat iron studies on 1/23/2023, with a ferritin of 12, percent iron saturation of 4%, LDH elevated to 364, with a total bilirubin of 0.4.  He again was managed with Venofer 300 mg infusions weekly x9 weeks.  With last dose administered on 5/4/2023.     Patient then had PET/CT on 2/02/2023, which at that time showed 2 thickened hypermetabolic small bowel loops with SUV max of 11.07 and 13.72.  They have felt that these findings were suspicious for an intussusception and a CT scan of the abdomen pelvis was recommended with and without contrast.  They still noted that there was no right axillary lymphadenopathy.     Patient was then  readmitted on 5/17/2023 through 5/18/2023, again due to acute on chronic blood loss anemia and received 2 units of packed red cells.  It was suspected to be an upper GI bleed and was referred for outpatient capsule endoscopy.  At the time of presentation on 5/17/2023 he had offered a 4-day history of black stools and dizziness in the morning.  When he presented to the ED his hemoglobin was 5.7.  After transfusion of 2 units of packed red cells his hemoglobin was up to 7.1.  Last hemoglobin on file as outpatient on 5/25/2023 was 8.4 with ferritin of 11.     Presented to [facility] ER on 6/28/23 with c/o weakness, epigastric pain and melanotic stools.  Hgb 4.6.  PMH c/w DM, gastric ulcers with h/o GI bleed s/p IV iron.  He underwent an EGD which did not show any evidence of upper GI bleeding. There were 2 small superficial ulcers/erosions in the duodenal bulb which were not felt to be the cause of the profound anemia with hemoglobin of less than 5 on admission. Patient was recommended to have a CT enterography due to PET/CT on 2/2/2023 with 2 thickened hypermetabolic masses and small bowel which were felt to be secondary to intussusception. Capsule endoscopy was to be considered pending results. CT enterography was performed on 6/29/2023, which showed an 8.7 cm area of masslike thickening involving a loop of small bowel within the right lower quadrant. There was an additional 7 cm area of masslike thickening involving a loop of small bowel within the left lower quadrant. These findings were felt to be suspicious of metastatic disease to small bowel given best history of melanoma.     S/p en bloc small bowel resection and [facility] on 6/30/23 - two separate foci of metastatic melanoma, 7.5 cm and 6.5 cm, BRAF mutant.      S/p cycle 4 Ipilimumab with 3 mg/Nivolumab 1 mg.  Tolerated very well.    Currently s/p cycle 27 single agent nivolumab now at 480 mg every 4 weeks. Tolerating well.     Fatigue:   No    Fevers:  No    Appetite/taste changes:  No    Mucositis:  No    Weight changes:  No    Nausea/vomiting:  No    Diarrhea:  No    Constipation:  No    Peripheral neuropathy:  No    Follows with his PCP.  Was not started on DM meds.  Monitoring BP.    Lives in [place], [place].    Feeling well today. Abd pain to RUQ 4-5/10 with inflammation, last month, has decreased.   No complaint today     ECOG PS 0    Review of Systems:   Review of Systems   Eyes:  Negative for eye problems.   Respiratory:  Negative for cough and shortness of breath.    Cardiovascular:  Negative for chest pain, leg swelling and palpitations.   Gastrointestinal:  Negative for abdominal pain and blood in stool.   Endocrine:        No heat or cold intolerance   Genitourinary:  Negative for difficulty urinating, dysuria, frequency and hematuria.    Musculoskeletal:  Negative for arthralgias, back pain and neck pain.   Skin:  Negative for itching and rash.   Neurological:  Negative for dizziness, extremity weakness and headaches.   Hematological:  Negative for adenopathy. Does not bruise/bleed easily.   Psychiatric/Behavioral:  Negative for sleep disturbance.          No current outpatient medications on file.     Allergies:   No Known Allergies    Past Medical History:    Anemia    with multiple blood transfusion    GIB (gastrointestinal bleeding)    Melanoma (HCC)    right shoulder s/p surgical excision and skin graft, adjuvant BRAF/MEK inhibitors for 1.5 yrs    Personal history of antineoplastic chemotherapy     Past Surgical History:   Procedure Laterality Date    Other surgical history      small intestine surgery    Upper gi endoscopy,exam  12/01/2022     Social History     Socioeconomic History    Marital status: Single   Tobacco Use    Smoking status: Never    Smokeless tobacco: Never   Vaping Use    Vaping status: Never Used   Substance and Sexual Activity    Alcohol use: Not Currently    Drug use: Never       History reviewed. No pertinent  family history.      PHYSICAL EXAM:    /73 (BP Location: Left arm, Patient Position: Sitting, Cuff Size: large)   Pulse 67   Temp 97.5 °F (36.4 °C) (Tympanic)   Resp 18   Ht 1.702 m (5' 7\")   Wt 108.4 kg (239 lb)   SpO2 97%   BMI 37.43 kg/m²   Wt Readings from Last 6 Encounters:   02/27/25 108.4 kg (239 lb)   01/30/25 105.2 kg (232 lb)   01/02/25 102.2 kg (225 lb 6.4 oz)   12/05/24 104.3 kg (230 lb)   11/07/24 104.8 kg (231 lb)   09/12/24 106.1 kg (234 lb)     Physical Exam  General: Patient is alert, not in acute distress.    HEENT: EOMs intact. PERRL, Anicteric.  MMM.   Neck: No JVD. No palpable lymphadenopathy. Neck is supple, surgical scar.  Chest: Clear to auscultation.  Heart: Regular rate and rhythm.   Abdomen: Soft, non tender with good bowel sounds.  Surgical scars. RUQ discomfort mild  Extremities: No edema.  Neurological: Grossly intact.   Psych/Depression: nl        ASSESSMENT/PLAN:     1. Malignant melanoma of right upper extremity including shoulder (HCC)    2. 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 Encounter for antineoplastic immunotherapy        Malignant melanoma of right upper extremity including shoulder (HCC)  Proceed with treatment with nivolumab and ipilimumab x 4 cycles followed by maintenance nivolumab for total of 18 to 24 months.  Discussed goal of treatment is still potentially curative with these agents.      S/p cycle 4 Ipilimumab 3 mg/Nivolumab 1 mg.      Tumor assessment after the fourth cycle of treatment with marked response to therapy.  Tumor assessment after cycle 10 with very minimal activity in the LN in the abdomen.  Most recent restaging study was April 11, 2024 with stable mesenteric lymph node in the right mid abdomen with mild FDG activity compatible with stable treated disease.  Also patient had nonenlarged right paratracheal node measuring 7 mm which is mildly active which was likely reactive.  This will be followed on upcoming PET/CT 2024.    S/p  cycle 27 maintenance nivolumab 480mg every 4 weeks.       Proceed with cycle 28.  Given that the patient lives far, and FDA approved to the 480 mg every 4 weeks nivolumab, will switch his completion of his treatment to this dosing and schedule given his distance to the treatment center.    Restaging PET/CT on 10/24/2024 with no evidence of disease.  He has a borderline right paratracheal lymphadenopathy which is mildly hypermetabolic and is unchanged.      Reviewed PET scan stable Jan 2025.  Next due in 4 months, May of 2025.    Recommend weight loss.       HTN/DM/high cholesterol; discussed with patient.  He will follow with PCP if further issues.  Sent message to PCP about lack of insurance and prescribing meds he can afford and use of programs to cover these. .    Goal is to complete a total of 18 months to 24 months of treatment from initiation of therapy.  24 months will be in August of 2025.    No orders of the defined types were placed in this encounter.    Call prn.  Follow-up in 4 weeks    MDM: high risk    Results From Past 48 Hours:  Recent Results (from the past 48 hours)   CBC W Differential W Platelet    Collection Time: 02/27/25 11:24 AM   Result Value Ref Range    WBC 4.8 4.0 - 11.0 x10(3) uL    RBC 4.64 4.30 - 5.70 x10(6)uL    HGB 14.9 13.0 - 17.5 g/dL    HCT 43.3 39.0 - 53.0 %    MCV 93.3 80.0 - 100.0 fL    MCH 32.1 26.0 - 34.0 pg    MCHC 34.4 31.0 - 37.0 g/dL    RDW-SD 44.4 35.1 - 46.3 fL    RDW 13.0 11.0 - 15.0 %    .0 150.0 - 450.0 10(3)uL    Neutrophil Absolute Prelim 2.63 1.50 - 7.70 x10 (3) uL    Neutrophil Absolute 2.63 1.50 - 7.70 x10(3) uL    Lymphocyte Absolute 1.56 1.00 - 4.00 x10(3) uL    Monocyte Absolute 0.43 0.10 - 1.00 x10(3) uL    Eosinophil Absolute 0.06 0.00 - 0.70 x10(3) uL    Basophil Absolute 0.08 0.00 - 0.20 x10(3) uL    Immature Granulocyte Absolute 0.02 0.00 - 1.00 x10(3) uL    Neutrophil % 55.0 %    Lymphocyte % 32.6 %    Monocyte % 9.0 %    Eosinophil % 1.3 %     Basophil % 1.7 %    Immature Granulocyte % 0.4 %   Comp Metabolic Panel (14)    Collection Time: 02/27/25 11:24 AM   Result Value Ref Range    Glucose 298 (H) 70 - 99 mg/dL    Sodium 138 136 - 145 mmol/L    Potassium 4.4 3.5 - 5.1 mmol/L    Chloride 104 98 - 112 mmol/L    CO2 25.0 21.0 - 32.0 mmol/L    Anion Gap 9 0 - 18 mmol/L    BUN 17 9 - 23 mg/dL    Creatinine 1.01 0.70 - 1.30 mg/dL    BUN/CREA Ratio 16.8 10.0 - 20.0    Calcium, Total 8.8 8.7 - 10.4 mg/dL    Calculated Osmolality 299 (H) 275 - 295 mOsm/kg    eGFR-Cr 86 >=60 mL/min/1.73m2    ALT 31 10 - 49 U/L    AST 24 <34 U/L    Alkaline Phosphatase 85 45 - 117 U/L    Bilirubin, Total 0.4 0.3 - 1.2 mg/dL    Total Protein 6.7 5.7 - 8.2 g/dL    Albumin 4.3 3.2 - 4.8 g/dL    Globulin  2.4 2.0 - 3.5 g/dL    A/G Ratio 1.8 1.0 - 2.0    Patient Fasting for CMP? Patient not present    TSH W REFLEX TO FREE T4 [E]    Collection Time: 02/27/25 11:24 AM   Result Value Ref Range    TSH 1.672 0.550 - 4.780 uIU/mL

## 2025-02-27 NOTE — PROGRESS NOTES
Patient arrives to infusion center for C28D1 Opdivo.  Arrives Ambulating independently, accompanied by Self. Patient denies new issues or complaints, labs reviewed.     Patient was evaluated today by MD.    Oral medications included in this regimen:  no    Patient confirms comprehension of cancer treatment schedule:  yes    Pregnancy screening:  Not applicable    Modifications in dose or schedule:  No    Medications appearance and physical integrity checked by RN: yes.    Chemotherapy IV pump settings verified by 2 RNs:  No due to targeted therapy IV administration.    Frequency of blood return and site check throughout administration: Prior to administration and At completion of therapy     Infusion/treatment outcome:  patient tolerated treatment without incident    Education Record    Learner:  Patient  Barriers / Limitations:  None  Method:  Reinforcement  Education / instructions given: Plan of care and upcoming appts reviewed.   Outcome:  Shows understanding    Discharged Home, Ambulating independently, accompanied by:Self    Patient/family verbalized understanding of future appointments: by [employer] messaging  and printed AVS.

## 2025-03-11 ENCOUNTER — SOCIAL WORK SERVICES (OUTPATIENT)
Age: 60
End: 2025-03-11

## 2025-03-11 NOTE — PROGRESS NOTES
SW followed up with patient to offer assistance and guidance for his current situation as uninsured. Patient is Urdu speaking only. This conversation was facilitated by  Brijesh ID # [id number]. Patient lives in [facility] and his primary care physician is in [place]. Patient is seen at our clinic for his cancer treatment and is actively enrolled with our financial assistance through the hospital through 4/15/25. Patient states that he has tried applying for financial assistance in [place] but was told that he already has application pending. Patient shared that he is not a citizen, therefore  patient's eligibility for benefits may be impacted. SW provided patient with the number to check the status of his Medicaid application [phone]. Patient was provided with resource to [facility] in [place], [phone] where he can be seen on a sliding scale based on his income. SIOBHAN explained that the [facility] would be a good resource to use while he is uninsured to get diabetes care and assistance with diabetes medications. SIOBHAN prompted patient to contact [facility] ASAP.SW will remain available.

## 2025-03-27 ENCOUNTER — OFFICE VISIT (OUTPATIENT)
Age: 60
End: 2025-03-27
Attending: INTERNAL MEDICINE

## 2025-03-27 ENCOUNTER — NURSE ONLY (OUTPATIENT)
Age: 60
End: 2025-03-27
Attending: INTERNAL MEDICINE

## 2025-03-27 VITALS
DIASTOLIC BLOOD PRESSURE: 79 MMHG | RESPIRATION RATE: 18 BRPM | HEIGHT: 67 IN | HEART RATE: 58 BPM | BODY MASS INDEX: 37.04 KG/M2 | TEMPERATURE: 95 F | WEIGHT: 236 LBS | OXYGEN SATURATION: 98 % | SYSTOLIC BLOOD PRESSURE: 132 MMHG

## 2025-03-27 DIAGNOSIS — Z51.12 ENCOUNTER FOR ANTINEOPLASTIC IMMUNOTHERAPY: ICD-10-CM

## 2025-03-27 DIAGNOSIS — C43.61 MALIGNANT MELANOMA OF RIGHT UPPER EXTREMITY INCLUDING SHOULDER (HCC): Primary | ICD-10-CM

## 2025-03-27 DIAGNOSIS — C78.89 MELANOMA METASTATIC TO DIGESTIVE ORGAN (HCC): ICD-10-CM

## 2025-03-27 LAB
ALBUMIN SERPL-MCNC: 4.7 G/DL (ref 3.2–4.8)
ALBUMIN/GLOB SERPL: 2 {RATIO} (ref 1–2)
ALP LIVER SERPL-CCNC: 85 U/L
ALT SERPL-CCNC: 29 U/L
ANION GAP SERPL CALC-SCNC: 7 MMOL/L (ref 0–18)
AST SERPL-CCNC: 19 U/L (ref ?–34)
BASOPHILS # BLD AUTO: 0.06 X10(3) UL (ref 0–0.2)
BASOPHILS NFR BLD AUTO: 1.2 %
BILIRUB SERPL-MCNC: 0.6 MG/DL (ref 0.3–1.2)
BUN BLD-MCNC: 17 MG/DL (ref 9–23)
BUN/CREAT SERPL: 19.8 (ref 10–20)
CALCIUM BLD-MCNC: 9.2 MG/DL (ref 8.7–10.4)
CHLORIDE SERPL-SCNC: 104 MMOL/L (ref 98–112)
CO2 SERPL-SCNC: 26 MMOL/L (ref 21–32)
CREAT BLD-MCNC: 0.86 MG/DL
DEPRECATED RDW RBC AUTO: 42.3 FL (ref 35.1–46.3)
EGFRCR SERPLBLD CKD-EPI 2021: 100 ML/MIN/1.73M2 (ref 60–?)
EOSINOPHIL # BLD AUTO: 0.06 X10(3) UL (ref 0–0.7)
EOSINOPHIL NFR BLD AUTO: 1.2 %
ERYTHROCYTE [DISTWIDTH] IN BLOOD BY AUTOMATED COUNT: 12.3 % (ref 11–15)
GLOBULIN PLAS-MCNC: 2.4 G/DL (ref 2–3.5)
GLUCOSE BLD-MCNC: 258 MG/DL (ref 70–99)
HCT VFR BLD AUTO: 43.2 %
HGB BLD-MCNC: 15.5 G/DL
IMM GRANULOCYTES # BLD AUTO: 0.01 X10(3) UL (ref 0–1)
IMM GRANULOCYTES NFR BLD: 0.2 %
LYMPHOCYTES # BLD AUTO: 1.88 X10(3) UL (ref 1–4)
LYMPHOCYTES NFR BLD AUTO: 38.8 %
MCH RBC QN AUTO: 33.3 PG (ref 26–34)
MCHC RBC AUTO-ENTMCNC: 35.9 G/DL (ref 31–37)
MCV RBC AUTO: 92.9 FL
MONOCYTES # BLD AUTO: 0.49 X10(3) UL (ref 0.1–1)
MONOCYTES NFR BLD AUTO: 10.1 %
NEUTROPHILS # BLD AUTO: 2.35 X10 (3) UL (ref 1.5–7.7)
NEUTROPHILS # BLD AUTO: 2.35 X10(3) UL (ref 1.5–7.7)
NEUTROPHILS NFR BLD AUTO: 48.5 %
OSMOLALITY SERPL CALC.SUM OF ELEC: 294 MOSM/KG (ref 275–295)
PLATELET # BLD AUTO: 195 10(3)UL (ref 150–450)
POTASSIUM SERPL-SCNC: 4.1 MMOL/L (ref 3.5–5.1)
PROT SERPL-MCNC: 7.1 G/DL (ref 5.7–8.2)
RBC # BLD AUTO: 4.65 X10(6)UL
SODIUM SERPL-SCNC: 137 MMOL/L (ref 136–145)
TSI SER-ACNC: 3.1 UIU/ML (ref 0.55–4.78)
WBC # BLD AUTO: 4.9 X10(3) UL (ref 4–11)

## 2025-03-27 NOTE — PROGRESS NOTES
Pt here for C29 D1 Opdivo.  Arrives Ambulating independently, accompanied by Self     Patient was evaluated today by EMIR.    Oral medications included in this regimen:  no    Patient confirms comprehension of cancer treatment schedule:  yes    Pregnancy screening:  Not applicable    Modifications in dose or schedule:  No    Medications appearance and physical integrity checked by RN: yes.    Chemotherapy IV pump settings verified by 2 RNs:  No due to targeted therapy IV administration.  Frequency of blood return and site check throughout administration: Prior to administration and At completion of therapy     Infusion/treatment outcome:  patient tolerated treatment without incident    Education Record    Learner:  Patient  Barriers / Limitations:  None  Method:  Discussion, Printed material, and Reinforcement  Education / instructions given:  Plan of care reviewed  Outcome:  Shows understanding    Discharged Home, Ambulating independently, accompanied by:Self    Patient/family verbalized understanding of future appointments: by printed AVS

## 2025-03-27 NOTE — PROGRESS NOTES
HPI   Cole Kendrick is a 59 year old male here for follow up of   Encounter Diagnoses   Name Primary?    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    Oncology history:   Extensive outside records from multiple facilities since March 2021 were reviewed.  This is a synopsis of those records, details can be found on Care Everywhere.  Patient had presented at [facility] in [facility] March 2021 due to a mass of the right shoulder.  At that time he underwent imaging with an MRI which showed a superficial right upper arm mass which appeared to be veins within the dermis.  It measured 3.9 cm x 8.3 cm x 5.9 cm it was felt that there was likely internal process of hemorrhage within the mass.  The mass demonstrated significant enhancement and it extended to the involved adjacent thickened areas of the dermis.  This mass was abutting the deltoid muscle and mildly effacing the deltoid muscle but not invading it.  There is also evidence of 13 mm short axis diameter right axillary lymph node which appeared to have a thickened cortex and was felt to be abnormal.  Dedicated ultrasound of the right axilla was recommended for further evaluation.  There were other smaller lymph nodes adjacent to the area.  Patient subsequently underwent right axillary lymph node ultrasound guided biopsy, the area contained multiple and large lymph nodes which were measuring between 3.5 and 3.2 cm.  There is at least 3 of them.  Pathology report showed lymph node tissue showing features consistent with nonspecific reactive lymphoid hyperplasia, there is no evidence of metastatic disease, nor a lymphoproliferative process or malignancy.     Patient was then referred to cancer center at [facility] in [facility], and was evaluated by her surgical oncologist Dr. Fabián Dawn.  Per review of his  consultation report, the patient had stated he had noted a new (mole) on the right arm 9 months prior to his original presentation and had enlarged over several months and began to bleed.  It was not painful and was not causing any neuromuscular issues.  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  We did discuss causes the results of the MRI above.  I was discussed for the patient to proceed with a radical resection of the right upper arm mass with placement of the wound VAC after performing the wide excision of the mass.  Surgical resection was performed on 5/26/2021 with en bloc resection of 12 cm with partial resection of the underlying right deltoid muscle.  Final wound measured 15 cm in diameter by 4 cm deep, and a wound VAC in place.  The pathology report showed that this was consistent with a malignant melanoma that measured 10.5 cm and was focally in the epidermis abutting ulcer with minute focus of possible melanoma in situ.  BRAF V600E was positive.  As the tumor staging summary, the histologic type was not determined.  The Breslow depth could not be determined.  There was ulceration present.  The mitotic rate was greater than 1/millimeter square.  Lymphovascular invasion was present.  Tumor infiltrating lymphocytes were present but nonbrisk.  There is no tumor regression noted.  The peripheral margins, and the deep margins were negative for invasive melanoma.  Tumor was 15 mm from the closest peripheral margin and 10 mm from the closest deep margin.  Plastic surgery was consulted for wound closure with skin grafting.  Patient underwent skin grafting on 6/8/2021.  Donor site was from the right thigh.     Patient was then seeking oncologic care in [facility] in [facility].  The records from that organization are not available on Care  Everywhere.  The patient states that he was receiving treatment with the local oncologist, Dr. Arce, with about 8 pills a day he was taking twice a day.  He states that he would pick them up at his doctor's office.  He states that he had had a PET scan which is showed positive lymph nodes in his right axilla.  On records available in care everywhere on 3/3/2022, there was a CT scan of the chest, abdomen and pelvis with contrast, which was compared to a PET/CT from September 16, 2021.  This states that on the prior PET/CT from September 2021 there had been enlarged right axillary lymphadenopathy that had measured 2.6 x 2.1 cm, and on the current study from March 3, 2022 it had resolved.  There was no other evidence seen of metastatic disease.  There was a 1 cm flash enhancing lesion in the right anterior lobe of the liver which was felt to be a hemangioma.     Then on July 25, 2022, it was noted that the patient had iron deficiency anemia.  At that time, on the note from the infusion center on 9/8/2022, at which time the patient was receiving Venofer infusions, the only medication listed on his med list was tramadol.  He did receive infusion of 300 mg of Venofer x6 doses as an outpatient.  Last dose was given on 10/27/2022.     On November 7, 2022, the patient received a blood transfusion for hemoglobin of 6.2, transfusion occurred on 11/8/2022.  Patient was then admitted on 12/21/2023 and discharged on 12/23/2023 to [facility], due to secondary acute anemia from blood loss secondary to GI bleed.  At that time he had presented with several weeks of dizziness, weakness, and left lower extremity swelling.  His oncologist recommended he go to the ED for evaluation.  He did a left lower extremity Doppler which was negative for DVT.  Hemoglobin at the time was 3.2.  Patient had complained of several weeks of painless red-maroon or black-colored stools which were not often.  He denied any abdominal  pain.  He denied any nausea or vomiting.  Patient received a total of 6 units of packed red cells and underwent an EGD which revealed 5 gastric ulcers with no active bleeding.  He was placed on 3 months of PPI therapy twice daily and was recommended for repeat EGD in 2 months.  At that time, the only medication listed was metformin.   Patient was then readmitted on 1/4/2023 and discharged on 1/6/2023, due to a recurrent GI bleed.  At that time, he had reported an abrupt onset of a sensation of fatigue, diaphoresis and dizziness, and then started having dark stools.  When he presented to the ED, with hypotension and hemoglobin of 8.1.  There was concern for recurrent GI bleed, however there is no evidence of overt GI bleed.  He was transfused 2 units of packed red cells, with hemoglobin up to 9.3 on day of discharge.  It was not recommended to repeat endoscopy at the time.  He was recommended to continue with the pantoprazole twice daily.  He had also had a tagged red cell scan during the first admission which was negative.       Patient presented back to his oncologist on 01/11/2023, with a hemoglobin of 6.5.  On 1/12/2023 he was then transfused 2 units of packed red cells at the outpatient infusion center at [facility].  Subsequent hemoglobin on 1/19/2023 was 7.1.  She did have repeat iron studies on 1/23/2023, with a ferritin of 12, percent iron saturation of 4%, LDH elevated to 364, with a total bilirubin of 0.4.  He again was managed with Venofer 300 mg infusions weekly x9 weeks.  With last dose administered on 5/4/2023.     Patient then had PET/CT on 2/02/2023, which at that time showed 2 thickened hypermetabolic small bowel loops with SUV max of 11.07 and 13.72.  They have felt that these findings were suspicious for an intussusception and a CT scan of the abdomen pelvis was recommended with and without contrast.  They still noted that there was no right axillary lymphadenopathy.     Patient was then  readmitted on 5/17/2023 through 5/18/2023, again due to acute on chronic blood loss anemia and received 2 units of packed red cells.  It was suspected to be an upper GI bleed and was referred for outpatient capsule endoscopy.  At the time of presentation on 5/17/2023 he had offered a 4-day history of black stools and dizziness in the morning.  When he presented to the ED his hemoglobin was 5.7.  After transfusion of 2 units of packed red cells his hemoglobin was up to 7.1.  Last hemoglobin on file as outpatient on 5/25/2023 was 8.4 with ferritin of 11.     Presented to [facility] ER on 6/28/23 with c/o weakness, epigastric pain and melanotic stools.  Hgb 4.6.  PMH c/w DM, gastric ulcers with h/o GI bleed s/p IV iron.  He underwent an EGD which did not show any evidence of upper GI bleeding. There were 2 small superficial ulcers/erosions in the duodenal bulb which were not felt to be the cause of the profound anemia with hemoglobin of less than 5 on admission. Patient was recommended to have a CT enterography due to PET/CT on 2/2/2023 with 2 thickened hypermetabolic masses and small bowel which were felt to be secondary to intussusception. Capsule endoscopy was to be considered pending results. CT enterography was performed on 6/29/2023, which showed an 8.7 cm area of masslike thickening involving a loop of small bowel within the right lower quadrant. There was an additional 7 cm area of masslike thickening involving a loop of small bowel within the left lower quadrant. These findings were felt to be suspicious of metastatic disease to small bowel given best history of melanoma.     S/p en bloc small bowel resection and partial omentectomy ([facility]) on 6/30/23 - two separate foci of metastatic melanoma, 7.5 cm and 6.5 cm, BRAF mutant.      S/p cycle 4 Ipilimumab with 3 mg/Nivolumab 1 mg.  Tolerated very well.    Currently s/p cycle 28 single agent nivolumab now at 480 mg every 4 weeks. Tolerating well.     Fatigue:   No    Fevers:  No    Appetite/taste changes:  No    Mucositis:  No    Weight changes:  No    Nausea/vomiting:  No    Diarrhea:  No    Constipation:  No    Peripheral neuropathy:  No    Follows with his PCP.  Was not started on DM meds.  Monitoring BP.    Lives in [place], [place].    Feeling well today. Abd pain to RUQ 4-5/10 with inflammation, last month, has decreased.   No complaint today     ECOG PS 0    Review of Systems:   Review of Systems   Constitutional:  Negative for fatigue and fever.   Eyes:  Negative for eye problems.   Respiratory:  Negative for cough and shortness of breath.    Cardiovascular:  Negative for chest pain, leg swelling and palpitations.   Gastrointestinal:  Negative for abdominal pain and blood in stool.   Endocrine:        No heat or cold intolerance   Genitourinary:  Negative for difficulty urinating, dysuria, frequency and hematuria.    Musculoskeletal:  Negative for arthralgias, back pain and neck pain.   Skin:  Negative for itching and rash.   Neurological:  Negative for dizziness, extremity weakness and headaches.   Hematological:  Negative for adenopathy. Does not bruise/bleed easily.   Psychiatric/Behavioral:  Negative for sleep disturbance.          No current outpatient medications on file.     Allergies:   No Known Allergies    Past Medical History:    Anemia    with multiple blood transfusion    GIB (gastrointestinal bleeding)    Melanoma (HCC)    right shoulder s/p surgical excision and skin graft, adjuvant BRAF/MEK inhibitors for 1.5 yrs    Personal history of antineoplastic chemotherapy     Past Surgical History:   Procedure Laterality Date    Other surgical history      small intestine surgery    Upper gi endoscopy,exam  12/01/2022     Social History     Socioeconomic History    Marital status: Single   Tobacco Use    Smoking status: Never    Smokeless tobacco: Never   Vaping Use    Vaping status: Never Used   Substance and Sexual Activity    Alcohol use: Not Currently    Drug  use: Never       No family history on file.      PHYSICAL EXAM:    /79 (BP Location: Left arm, Patient Position: Sitting, Cuff Size: large)   Pulse 58   Temp (!) 95.4 °F (35.2 °C) (Tympanic)   Resp 18   Ht 1.702 m (5' 7\")   Wt 107 kg (236 lb)   SpO2 98%   BMI 36.96 kg/m²   Wt Readings from Last 6 Encounters:   02/27/25 108.4 kg (239 lb)   01/30/25 105.2 kg (232 lb)   01/02/25 102.2 kg (225 lb 6.4 oz)   12/05/24 104.3 kg (230 lb)   11/07/24 104.8 kg (231 lb)   09/12/24 106.1 kg (234 lb)     Physical Exam  General: Patient is alert, not in acute distress.    HEENT: EOMs intact. PERRL, Anicteric.  MMM.   Neck: No JVD. No palpable lymphadenopathy. Neck is supple, surgical scar.  Chest: Clear to auscultation.  Heart: Regular rate and rhythm.   Abdomen: Soft, non tender with good bowel sounds.  Surgical scars. RUQ discomfort mild- none today   Extremities: No edema.  Neurological: Grossly intact.   Psych/Depression: nl        ASSESSMENT/PLAN:     1. Malignant melanoma of right upper extremity including shoulder (HCC)    2. 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 Encounter for antineoplastic immunotherapy        Malignant melanoma of right upper extremity including shoulder (HCC)  Proceed with treatment with nivolumab and ipilimumab x 4 cycles followed by maintenance nivolumab for total of 18 to 24 months.  Discussed goal of treatment is still potentially curative with these agents.      S/p cycle 4 Ipilimumab 3 mg/Nivolumab 1 mg.      Tumor assessment after the fourth cycle of treatment with marked response to therapy.  Tumor assessment after cycle 10 with very minimal activity in the LN in the abdomen.  Most recent restaging study was April 11, 2024 with stable mesenteric lymph node in the right mid abdomen with mild FDG activity compatible with stable treated disease.  Also patient had nonenlarged right paratracheal node measuring 7 mm which is mildly active which was likely reactive.  This will  be followed on upcoming PET/CT 2024.    S/p cycle 28 maintenance nivolumab 480mg every 4 weeks.       Proceed with cycle 29.  Given that the patient lives far, and FDA approved to the 480 mg every 4 weeks nivolumab, will switch his completion of his treatment to this dosing and schedule given his distance to the treatment center.    Restaging PET/CT on 10/24/2024 with no evidence of disease.  He has a borderline right paratracheal lymphadenopathy which is mildly hypermetabolic and is unchanged.      Reviewed PET scan stable Jan 2025.  Next due in 4 months, May of 2025.    Recommend weight loss.       HTN/DM/high cholesterol; discussed with patient.  He will follow with PCP if further issues.  Sent message to PCP about lack of insurance and prescribing meds he can afford and use of programs to cover these. .    Applying for assistance for metformin thru PCP.     Goal is to complete a total of 18 months to 24 months of treatment from initiation of therapy.  24 months will be in August of 2025.    No orders of the defined types were placed in this encounter.    Call prn.  Follow-up in 4 weeks    MDM: high risk    Results From Past 48 Hours:  Recent Results (from the past 48 hours)   CBC W Differential W Platelet    Collection Time: 03/27/25  7:46 AM   Result Value Ref Range    WBC 4.9 4.0 - 11.0 x10(3) uL    RBC 4.65 4.30 - 5.70 x10(6)uL    HGB 15.5 13.0 - 17.5 g/dL    HCT 43.2 39.0 - 53.0 %    MCV 92.9 80.0 - 100.0 fL    MCH 33.3 26.0 - 34.0 pg    MCHC 35.9 31.0 - 37.0 g/dL    RDW-SD 42.3 35.1 - 46.3 fL    RDW 12.3 11.0 - 15.0 %    .0 150.0 - 450.0 10(3)uL    Neutrophil Absolute Prelim 2.35 1.50 - 7.70 x10 (3) uL    Neutrophil Absolute 2.35 1.50 - 7.70 x10(3) uL    Lymphocyte Absolute 1.88 1.00 - 4.00 x10(3) uL    Monocyte Absolute 0.49 0.10 - 1.00 x10(3) uL    Eosinophil Absolute 0.06 0.00 - 0.70 x10(3) uL    Basophil Absolute 0.06 0.00 - 0.20 x10(3) uL    Immature Granulocyte Absolute 0.01 0.00 - 1.00 x10(3)  uL    Neutrophil % 48.5 %    Lymphocyte % 38.8 %    Monocyte % 10.1 %    Eosinophil % 1.2 %    Basophil % 1.2 %    Immature Granulocyte % 0.2 %   Comp Metabolic Panel (14)    Collection Time: 03/27/25  7:46 AM   Result Value Ref Range    Glucose 258 (H) 70 - 99 mg/dL    Sodium 137 136 - 145 mmol/L    Potassium 4.1 3.5 - 5.1 mmol/L    Chloride 104 98 - 112 mmol/L    CO2 26.0 21.0 - 32.0 mmol/L    Anion Gap 7 0 - 18 mmol/L    BUN 17 9 - 23 mg/dL    Creatinine 0.86 0.70 - 1.30 mg/dL    BUN/CREA Ratio 19.8 10.0 - 20.0    Calcium, Total 9.2 8.7 - 10.4 mg/dL    Calculated Osmolality 294 275 - 295 mOsm/kg    eGFR-Cr 100 >=60 mL/min/1.73m2    ALT 29 10 - 49 U/L    AST 19 <34 U/L    Alkaline Phosphatase 85 45 - 117 U/L    Bilirubin, Total 0.6 0.3 - 1.2 mg/dL    Total Protein 7.1 5.7 - 8.2 g/dL    Albumin 4.7 3.2 - 4.8 g/dL    Globulin  2.4 2.0 - 3.5 g/dL    A/G Ratio 2.0 1.0 - 2.0    Patient Fasting for CMP? Patient not present    TSH W REFLEX TO FREE T4 [E]    Collection Time: 03/27/25  7:46 AM   Result Value Ref Range    TSH 3.105 0.550 - 4.780 uIU/mL

## 2025-04-21 ENCOUNTER — SOCIAL WORK SERVICES (OUTPATIENT)
Age: 60
End: 2025-04-21

## 2025-04-21 NOTE — PROGRESS NOTES
SW attempted to contact patient to assist with reapplying for financial assistance as his previous approval has  4/15/25. SIOBHAN used  services to leave patient a detailed message requesting a call back. SW will forward the Turkmen version of the application to patient via [id number].    Patient return call. SIOBHAN conferenced in . Patient and son will come in to meet with SIOBHAN tomorrow at 3:30 to reapply for assistance.

## 2025-04-22 ENCOUNTER — SOCIAL WORK SERVICES (OUTPATIENT)
Age: 60
End: 2025-04-22

## 2025-04-22 NOTE — PROGRESS NOTES
SW met with patient to complete financial assistance application. Patient was accompanied by family member who signed the room and board statement. SW forwarded patient's application to [email or web address].     Supportive Oncology Services    Supportive visit to patient and , Marvin, alongside newly diagnosed metastatic breast cancer.  Patient is notably anxious, identifying difficulty processing current diagnosis and plans for treatment.  Patient reviews desire to schedule longer visit in the future, but is agreeable to speaking for a few minutes.    Briefly, patient describes limited mental health history.  Reports being healthy until approximately 3 years ago, with medical complexity including rheumatoid arthritis, diabetes, and now cancer.  Patient reports challenging fatigue, despite generally appropriate sleep.  Occasionally will use Xanax at bedtime to assist with initiation, which is helpful.  Patient has not been able to fill Ativan from Dr. De La O.  Patient continues on Celexa 40 mg daily, prescribed per PCP although with limited response.  Reports appreciation of distraction to assist with current anxieties and demoralization.  Patient denies past or current suicidality, mental health care in or outpatient, or mental health disruption in family.    Patient appreciates offering of support, is agreeable to formal follow-up visit this Thursday, which has been scheduled.

## 2025-04-24 ENCOUNTER — NURSE ONLY (OUTPATIENT)
Age: 60
End: 2025-04-24
Attending: INTERNAL MEDICINE

## 2025-04-24 ENCOUNTER — OFFICE VISIT (OUTPATIENT)
Age: 60
End: 2025-04-24
Attending: INTERNAL MEDICINE

## 2025-04-24 ENCOUNTER — OFFICE VISIT (OUTPATIENT)
Age: 60
End: 2025-04-24
Attending: NURSE PRACTITIONER

## 2025-04-24 VITALS
BODY MASS INDEX: 36.73 KG/M2 | SYSTOLIC BLOOD PRESSURE: 134 MMHG | DIASTOLIC BLOOD PRESSURE: 80 MMHG | HEIGHT: 67 IN | HEART RATE: 66 BPM | OXYGEN SATURATION: 98 % | WEIGHT: 234 LBS | TEMPERATURE: 99 F | RESPIRATION RATE: 18 BRPM

## 2025-04-24 DIAGNOSIS — C78.89 MELANOMA METASTATIC TO DIGESTIVE ORGAN (HCC): ICD-10-CM

## 2025-04-24 DIAGNOSIS — C43.61 MALIGNANT MELANOMA OF RIGHT UPPER EXTREMITY INCLUDING SHOULDER (HCC): Primary | ICD-10-CM

## 2025-04-24 DIAGNOSIS — Z51.12 ENCOUNTER FOR ANTINEOPLASTIC IMMUNOTHERAPY: ICD-10-CM

## 2025-04-24 LAB
ALBUMIN SERPL-MCNC: 4.5 G/DL (ref 3.2–4.8)
ALBUMIN/GLOB SERPL: 2 {RATIO} (ref 1–2)
ALP LIVER SERPL-CCNC: 92 U/L (ref 45–117)
ALT SERPL-CCNC: 36 U/L (ref 10–49)
ANION GAP SERPL CALC-SCNC: 8 MMOL/L (ref 0–18)
AST SERPL-CCNC: 24 U/L (ref ?–34)
BASOPHILS # BLD AUTO: 0.06 X10(3) UL (ref 0–0.2)
BASOPHILS NFR BLD AUTO: 0.8 %
BILIRUB SERPL-MCNC: 0.6 MG/DL (ref 0.3–1.2)
BUN BLD-MCNC: 16 MG/DL (ref 9–23)
BUN/CREAT SERPL: 19 (ref 10–20)
CALCIUM BLD-MCNC: 8.9 MG/DL (ref 8.7–10.4)
CHLORIDE SERPL-SCNC: 105 MMOL/L (ref 98–112)
CO2 SERPL-SCNC: 25 MMOL/L (ref 21–32)
CREAT BLD-MCNC: 0.84 MG/DL (ref 0.7–1.3)
DEPRECATED RDW RBC AUTO: 41.7 FL (ref 35.1–46.3)
EGFRCR SERPLBLD CKD-EPI 2021: 100 ML/MIN/1.73M2 (ref 60–?)
EOSINOPHIL # BLD AUTO: 0.12 X10(3) UL (ref 0–0.7)
EOSINOPHIL NFR BLD AUTO: 1.6 %
ERYTHROCYTE [DISTWIDTH] IN BLOOD BY AUTOMATED COUNT: 12.1 % (ref 11–15)
GLOBULIN PLAS-MCNC: 2.3 G/DL (ref 2–3.5)
GLUCOSE BLD-MCNC: 221 MG/DL (ref 70–99)
HCT VFR BLD AUTO: 43.9 % (ref 39–53)
HGB BLD-MCNC: 15.1 G/DL (ref 13–17.5)
IMM GRANULOCYTES # BLD AUTO: 0.02 X10(3) UL (ref 0–1)
IMM GRANULOCYTES NFR BLD: 0.3 %
LYMPHOCYTES # BLD AUTO: 1.34 X10(3) UL (ref 1–4)
LYMPHOCYTES NFR BLD AUTO: 18.2 %
MCH RBC QN AUTO: 32.2 PG (ref 26–34)
MCHC RBC AUTO-ENTMCNC: 34.4 G/DL (ref 31–37)
MCV RBC AUTO: 93.6 FL (ref 80–100)
MONOCYTES # BLD AUTO: 0.71 X10(3) UL (ref 0.1–1)
MONOCYTES NFR BLD AUTO: 9.6 %
NEUTROPHILS # BLD AUTO: 5.12 X10 (3) UL (ref 1.5–7.7)
NEUTROPHILS # BLD AUTO: 5.12 X10(3) UL (ref 1.5–7.7)
NEUTROPHILS NFR BLD AUTO: 69.5 %
OSMOLALITY SERPL CALC.SUM OF ELEC: 294 MOSM/KG (ref 275–295)
PLATELET # BLD AUTO: 212 10(3)UL (ref 150–450)
POTASSIUM SERPL-SCNC: 4.1 MMOL/L (ref 3.5–5.1)
PROT SERPL-MCNC: 6.8 G/DL (ref 5.7–8.2)
RBC # BLD AUTO: 4.69 X10(6)UL (ref 4.3–5.7)
SODIUM SERPL-SCNC: 138 MMOL/L (ref 136–145)
TSI SER-ACNC: 3.91 UIU/ML (ref 0.55–4.78)
WBC # BLD AUTO: 7.4 X10(3) UL (ref 4–11)

## 2025-04-24 NOTE — PROGRESS NOTES
HPI   Cole Kendrick is a 59 year old male here for follow up of   Encounter Diagnoses   Name Primary?    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    Oncology history:   Extensive outside records from multiple facilities since March 2021 were reviewed.  This is a synopsis of those records, details can be found on Care Everywhere.  Patient had presented at [facility] in [facility] March 2021 due to a mass of the right shoulder.  At that time he underwent imaging with an MRI which showed a superficial right upper arm mass which appeared to be veins within the dermis.  It measured 3.9 cm x 8.3 cm x 5.9 cm it was felt that there was likely internal process of hemorrhage within the mass.  The mass demonstrated significant enhancement and it extended to the involved adjacent thickened areas of the dermis.  This mass was abutting the deltoid muscle and mildly effacing the deltoid muscle but not invading it.  There is also evidence of 13 mm short axis diameter right axillary lymph node which appeared to have a thickened cortex and was felt to be abnormal.  Dedicated ultrasound of the right axilla was recommended for further evaluation.  There were other smaller lymph nodes adjacent to the area.  Patient subsequently underwent right axillary lymph node ultrasound guided biopsy, the area contained multiple and large lymph nodes which were measuring between 3.5 and 3.2 cm.  There is at least 3 of them.  Pathology report showed lymph node tissue showing features consistent with nonspecific reactive lymphoid hyperplasia, there is no evidence of metastatic disease, nor a lymphoproliferative process or malignancy.     Patient was then referred to cancer center at [facility] in [facility], and was evaluated by her surgical oncologist Dr. Fabián Dawn.  Per review of his  consultation report, the patient had stated he had noted a new (mole) on the right arm 9 months prior to his original presentation and had enlarged over several months and began to bleed.  It was not painful and was not causing any neuromuscular issues.  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  We did discuss causes the results of the MRI above.  I was discussed for the patient to proceed with a radical resection of the right upper arm mass with placement of the wound VAC after performing the wide excision of the mass.  Surgical resection was performed on 5/26/2021 with en bloc resection of 12 cm with partial resection of the underlying right deltoid muscle.  Final wound measured 15 cm in diameter by 4 cm deep, and a wound VAC in place.  The pathology report showed that this was consistent with a malignant melanoma that measured 10.5 cm and was focally in the epidermis abutting ulcer with minute focus of possible melanoma in situ.  BRAF V600E was positive.  As the tumor staging summary, the histologic type was not determined.  The Breslow depth could not be determined.  There was ulceration present.  The mitotic rate was greater than 1/millimeter square.  Lymphovascular invasion was present.  Tumor infiltrating lymphocytes were present but nonbrisk.  There is no tumor regression noted.  The peripheral margins, and the deep margins were negative for invasive melanoma.  Tumor was 15 mm from the closest peripheral margin and 10 mm from the closest deep margin.  Plastic surgery was consulted for wound closure with skin grafting.  Patient underwent skin grafting on 6/8/2021.  Donor site was from the right thigh.     Patient was then seeking oncologic care in [facility] in [facility].  The records from that organization are not available on Care  Everywhere.  The patient states that he was receiving treatment with the local oncologist, Dr. Arce, with about 8 pills a day he was taking twice a day.  He states that he would pick them up at his doctor's office.  He states that he had had a PET scan which is showed positive lymph nodes in his right axilla.  On records available in care everywhere on 3/3/2022, there was a CT scan of the chest, abdomen and pelvis with contrast, which was compared to a PET/CT from September 16, 2021.  This states that on the prior PET/CT from September 2021 there had been enlarged right axillary lymphadenopathy that had measured 2.6 x 2.1 cm, and on the current study from March 3, 2022 it had resolved.  There was no other evidence seen of metastatic disease.  There was a 1 cm flash enhancing lesion in the right anterior lobe of the liver which was felt to be a hemangioma.     Then on July 25, 2022, it was noted that the patient had iron deficiency anemia.  At that time, on the note from the infusion center on 9/8/2022, at which time the patient was receiving Venofer infusions, the only medication listed on his med list was tramadol.  He did receive infusion of 300 mg of Venofer x6 doses as an outpatient.  Last dose was given on 10/27/2022.     On November 7, 2022, the patient received a blood transfusion for hemoglobin of 6.2, transfusion occurred on 11/8/2022.  Patient was then admitted on 12/21/2023 and discharged on 12/23/2023 to [facility], due to secondary acute anemia from blood loss secondary to GI bleed.  At that time he had presented with several weeks of dizziness, weakness, and left lower extremity swelling.  His oncologist recommended he go to the ED for evaluation.  He did a left lower extremity Doppler which was negative for DVT.  Hemoglobin at the time was 3.2.  Patient had complained of several weeks of painless red-maroon or black-colored stools which were not often.  He denied any abdominal  pain.  He denied any nausea or vomiting.  Patient received a total of 6 units of packed red cells and underwent an EGD which revealed 5 gastric ulcers with no active bleeding.  He was placed on 3 months of PPI therapy twice daily and was recommended for repeat EGD in 2 months.  At that time, the only medication listed was metformin.   Patient was then readmitted on 1/4/2023 and discharged on 1/6/2023, due to a recurrent GI bleed.  At that time, he had reported an abrupt onset of a sensation of fatigue, diaphoresis and dizziness, and then started having dark stools.  When he presented to the ED, with hypotension and hemoglobin of 8.1.  There was concern for recurrent GI bleed, however there is no evidence of overt GI bleed.  He was transfused 2 units of packed red cells, with hemoglobin up to 9.3 on day of discharge.  It was not recommended to repeat endoscopy at the time.  He was recommended to continue with the pantoprazole twice daily.  He had also had a tagged red cell scan during the first admission which was negative.       Patient presented back to his oncologist on 01/11/2023, with a hemoglobin of 6.5.  On 1/12/2023 he was then transfused 2 units of packed red cells at the outpatient infusion center at [facility].  Subsequent hemoglobin on 1/19/2023 was 7.1.  She did have repeat iron studies on 1/23/2023, with a ferritin of 12, percent iron saturation of 4%, LDH elevated to 364, with a total bilirubin of 0.4.  He again was managed with Venofer 300 mg infusions weekly x9 weeks.  With last dose administered on 5/4/2023.     Patient then had PET/CT on 2/02/2023, which at that time showed 2 thickened hypermetabolic small bowel loops with SUV max of 11.07 and 13.72.  They have felt that these findings were suspicious for an intussusception and a CT scan of the abdomen pelvis was recommended with and without contrast.  They still noted that there was no right axillary lymphadenopathy.     Patient was then  readmitted on 5/17/2023 through 5/18/2023, again due to acute on chronic blood loss anemia and received 2 units of packed red cells.  It was suspected to be an upper GI bleed and was referred for outpatient capsule endoscopy.  At the time of presentation on 5/17/2023 he had offered a 4-day history of black stools and dizziness in the morning.  When he presented to the ED his hemoglobin was 5.7.  After transfusion of 2 units of packed red cells his hemoglobin was up to 7.1.  Last hemoglobin on file as outpatient on 5/25/2023 was 8.4 with ferritin of 11.     Presented to [facility] ER on 6/28/23 with c/o weakness, epigastric pain and melanotic stools.  Hgb 4.6.  PMH c/w DM, gastric ulcers with h/o GI bleed s/p IV iron.  He underwent an EGD which did not show any evidence of upper GI bleeding. There were 2 small superficial ulcers/erosions in the duodenal bulb which were not felt to be the cause of the profound anemia with hemoglobin of less than 5 on admission. Patient was recommended to have a CT enterography due to PET/CT on 2/2/2023 with 2 thickened hypermetabolic masses and small bowel which were felt to be secondary to intussusception. Capsule endoscopy was to be considered pending results. CT enterography was performed on 6/29/2023, which showed an 8.7 cm area of masslike thickening involving a loop of small bowel within the right lower quadrant. There was an additional 7 cm area of masslike thickening involving a loop of small bowel within the left lower quadrant. These findings were felt to be suspicious of metastatic disease to small bowel given best history of melanoma.     S/p en bloc small bowel resection and partial omentectomy ([facility]) on 6/30/23 - two separate foci of metastatic melanoma, 7.5 cm and 6.5 cm, BRAF mutant.      S/p cycle 4 Ipilimumab with 3 mg/Nivolumab 1 mg.  Tolerated very well.    Currently s/p cycle 29 single agent nivolumab now at 480 mg every 4 weeks. Tolerating well.     Fatigue:   No    Fevers:  No    Appetite/taste changes:  No    Mucositis:  No    Weight changes:  No    Nausea/vomiting:  No    Diarrhea:  No    Constipation:  No    Peripheral neuropathy:  No    Follows with his PCP.  Was not started on DM meds.  Monitoring BP.    Lives in [place], [place].    Feeling well today. No c/o Abd pain today.      ECOG PS 0    Review of Systems:   Review of Systems   Constitutional:  Negative for fatigue and fever.   Eyes:  Negative for eye problems.   Respiratory:  Negative for cough and shortness of breath.    Cardiovascular:  Negative for chest pain, leg swelling and palpitations.   Gastrointestinal:  Negative for abdominal pain and blood in stool.   Endocrine:        No heat or cold intolerance   Genitourinary:  Negative for difficulty urinating, dysuria, frequency and hematuria.    Musculoskeletal:  Negative for arthralgias, back pain and neck pain.   Skin:  Negative for itching and rash.   Neurological:  Negative for dizziness, extremity weakness and headaches.   Hematological:  Negative for adenopathy. Does not bruise/bleed easily.   Psychiatric/Behavioral:  Negative for sleep disturbance.          No current outpatient medications on file.     Allergies:   No Known Allergies    Past Medical History:    Anemia    with multiple blood transfusion    GIB (gastrointestinal bleeding)    Melanoma (HCC)    right shoulder s/p surgical excision and skin graft, adjuvant BRAF/MEK inhibitors for 1.5 yrs    Personal history of antineoplastic chemotherapy     Past Surgical History:   Procedure Laterality Date    Other surgical history      small intestine surgery    Upper gi endoscopy,exam  12/01/2022     Social History     Socioeconomic History    Marital status: Single   Tobacco Use    Smoking status: Never    Smokeless tobacco: Never   Vaping Use    Vaping status: Never Used   Substance and Sexual Activity    Alcohol use: Not Currently    Drug use: Never       No family history on file.      PHYSICAL EXAM:     /80 (BP Location: Left arm, Patient Position: Sitting, Cuff Size: large)   Pulse 66   Temp 98.6 °F (37 °C) (Oral)   Resp 18   Ht 1.702 m (5' 7\")   Wt 106.1 kg (234 lb)   SpO2 98%   BMI 36.65 kg/m²   Wt Readings from Last 6 Encounters:   03/27/25 107 kg (236 lb)   02/27/25 108.4 kg (239 lb)   01/30/25 105.2 kg (232 lb)   01/02/25 102.2 kg (225 lb 6.4 oz)   12/05/24 104.3 kg (230 lb)   11/07/24 104.8 kg (231 lb)     Physical Exam  General: Patient is alert, not in acute distress.    HEENT: EOMs intact. PERRL, Anicteric.  MMM.   Neck: No JVD. No palpable lymphadenopathy. Neck is supple, surgical scar.  Chest: Clear to auscultation.  Heart: Regular rate and rhythm.   Abdomen: Soft, non tender with good bowel sounds.  Surgical scars. RUQ discomfort mild- none today   Extremities: No edema.  Neurological: Grossly intact.   Psych/Depression: nl        ASSESSMENT/PLAN:     1. Malignant melanoma of right upper extremity including shoulder (HCC)    2. 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 Encounter for antineoplastic immunotherapy        Malignant melanoma of right upper extremity including shoulder (HCC)  Proceed with treatment with nivolumab and ipilimumab x 4 cycles followed by maintenance nivolumab for total of 18 to 24 months.  Discussed goal of treatment is still potentially curative with these agents.      S/p cycle 4 Ipilimumab 3 mg/Nivolumab 1 mg.      Tumor assessment after the fourth cycle of treatment with marked response to therapy.  Tumor assessment after cycle 10 with very minimal activity in the LN in the abdomen.  Most recent restaging study was April 11, 2024 with stable mesenteric lymph node in the right mid abdomen with mild FDG activity compatible with stable treated disease.  Also patient had nonenlarged right paratracheal node measuring 7 mm which is mildly active which was likely reactive.  This will be followed on upcoming PET/CT 2024.    S/p cycle 29 maintenance nivolumab  480mg every 4 weeks.       Proceed with cycle 30.  Given that the patient lives far, and FDA approved to the 480 mg every 4 weeks nivolumab, will switch his completion of his treatment to this dosing and schedule given his distance to the treatment center.    Restaging PET/CT on 10/24/2024 with no evidence of disease.  He has a borderline right paratracheal lymphadenopathy which is mildly hypermetabolic and is unchanged.      Reviewed PET scan stable Jan 2025.  Next due in 4 months, May of 2025.    Recommend weight loss.       HTN/DM/high cholesterol; discussed with patient.  He will follow with PCP if further issues.  Sent message to PCP about lack of insurance and prescribing meds he can afford and use of programs to cover these. .    Applying for assistance for metformin thru PCP.     Goal is to complete a total of 18 months to 24 months of treatment from initiation of therapy.  24 months will be in August of 2025.    No orders of the defined types were placed in this encounter.    Call prn.  Follow-up in 4 weeks    MDM: high risk    Results From Past 48 Hours:  Recent Results (from the past 48 hours)   CBC W Differential W Platelet    Collection Time: 04/24/25  7:18 AM   Result Value Ref Range    WBC 7.4 4.0 - 11.0 x10(3) uL    RBC 4.69 4.30 - 5.70 x10(6)uL    HGB 15.1 13.0 - 17.5 g/dL    HCT 43.9 39.0 - 53.0 %    MCV 93.6 80.0 - 100.0 fL    MCH 32.2 26.0 - 34.0 pg    MCHC 34.4 31.0 - 37.0 g/dL    RDW-SD 41.7 35.1 - 46.3 fL    RDW 12.1 11.0 - 15.0 %    .0 150.0 - 450.0 10(3)uL    Neutrophil Absolute Prelim 5.12 1.50 - 7.70 x10 (3) uL    Neutrophil Absolute 5.12 1.50 - 7.70 x10(3) uL    Lymphocyte Absolute 1.34 1.00 - 4.00 x10(3) uL    Monocyte Absolute 0.71 0.10 - 1.00 x10(3) uL    Eosinophil Absolute 0.12 0.00 - 0.70 x10(3) uL    Basophil Absolute 0.06 0.00 - 0.20 x10(3) uL    Immature Granulocyte Absolute 0.02 0.00 - 1.00 x10(3) uL    Neutrophil % 69.5 %    Lymphocyte % 18.2 %    Monocyte % 9.6 %     Eosinophil % 1.6 %    Basophil % 0.8 %    Immature Granulocyte % 0.3 %   Comp Metabolic Panel (14)    Collection Time: 04/24/25  7:18 AM   Result Value Ref Range    Glucose 221 (H) 70 - 99 mg/dL    Sodium 138 136 - 145 mmol/L    Potassium 4.1 3.5 - 5.1 mmol/L    Chloride 105 98 - 112 mmol/L    CO2 25.0 21.0 - 32.0 mmol/L    Anion Gap 8 0 - 18 mmol/L    BUN 16 9 - 23 mg/dL    Creatinine 0.84 0.70 - 1.30 mg/dL    BUN/CREA Ratio 19.0 10.0 - 20.0    Calcium, Total 8.9 8.7 - 10.4 mg/dL    Calculated Osmolality 294 275 - 295 mOsm/kg    eGFR-Cr 100 >=60 mL/min/1.73m2    ALT 36 10 - 49 U/L    AST 24 <34 U/L    Alkaline Phosphatase 92 45 - 117 U/L    Bilirubin, Total 0.6 0.3 - 1.2 mg/dL    Total Protein 6.8 5.7 - 8.2 g/dL    Albumin 4.5 3.2 - 4.8 g/dL    Globulin  2.3 2.0 - 3.5 g/dL    A/G Ratio 2.0 1.0 - 2.0    Patient Fasting for CMP? Patient not present    TSH W REFLEX TO FREE T4 [E]    Collection Time: 04/24/25  7:18 AM   Result Value Ref Range    TSH 3.911 0.550 - 4.780 uIU/mL

## 2025-04-24 NOTE — PROGRESS NOTES
Cole arrives to clinic for C30 D1 Opdivo.  Arrives Ambulating independently, accompanied by Self     Patient was evaluated today by EMIR.    Oral medications included in this regimen:  no    Patient confirms comprehension of cancer treatment schedule:  yes    Pregnancy screening:  Not applicable    Modifications in dose or schedule:  No    Medications appearance and physical integrity checked by RN: yes.    Chemotherapy IV pump settings verified by 2 RNs:  No due to targeted therapy IV administration.  Frequency of blood return and site check throughout administration: Prior to administration and At completion of therapy     Infusion/treatment outcome:  patient tolerated treatment without incident    Education Record    Learner:  Patient  Barriers / Limitations:  Language  Method:  Reinforcement  Education / instructions given:  Medication, POC - no questions at this time  Outcome:  Shows understanding    Discharged Home, Ambulating independently, accompanied by:Self    Patient/family verbalized understanding of future appointments: by printed AVS

## 2025-05-22 ENCOUNTER — OFFICE VISIT (OUTPATIENT)
Age: 60
End: 2025-05-22
Attending: INTERNAL MEDICINE

## 2025-05-22 ENCOUNTER — OFFICE VISIT (OUTPATIENT)
Age: 60
End: 2025-05-22
Attending: NURSE PRACTITIONER

## 2025-05-22 ENCOUNTER — NURSE ONLY (OUTPATIENT)
Age: 60
End: 2025-05-22
Attending: INTERNAL MEDICINE

## 2025-05-22 VITALS
BODY MASS INDEX: 36.83 KG/M2 | OXYGEN SATURATION: 97 % | RESPIRATION RATE: 18 BRPM | HEIGHT: 67 IN | WEIGHT: 234.63 LBS | HEART RATE: 61 BPM | TEMPERATURE: 98 F | DIASTOLIC BLOOD PRESSURE: 81 MMHG | SYSTOLIC BLOOD PRESSURE: 138 MMHG

## 2025-05-22 DIAGNOSIS — C43.61 MALIGNANT MELANOMA OF RIGHT UPPER EXTREMITY INCLUDING SHOULDER (HCC): Primary | ICD-10-CM

## 2025-05-22 DIAGNOSIS — Z51.12 ENCOUNTER FOR ANTINEOPLASTIC IMMUNOTHERAPY: ICD-10-CM

## 2025-05-22 DIAGNOSIS — C78.89 MELANOMA METASTATIC TO DIGESTIVE ORGAN (HCC): ICD-10-CM

## 2025-05-22 LAB
ALBUMIN SERPL-MCNC: 4.5 G/DL (ref 3.2–4.8)
ALBUMIN/GLOB SERPL: 1.8 {RATIO} (ref 1–2)
ALP LIVER SERPL-CCNC: 92 U/L (ref 45–117)
ALT SERPL-CCNC: 30 U/L (ref 10–49)
ANION GAP SERPL CALC-SCNC: 4 MMOL/L (ref 0–18)
AST SERPL-CCNC: 17 U/L (ref ?–34)
BASOPHILS # BLD AUTO: 0.08 X10(3) UL (ref 0–0.2)
BASOPHILS NFR BLD AUTO: 1.6 %
BILIRUB SERPL-MCNC: 0.6 MG/DL (ref 0.3–1.2)
BUN BLD-MCNC: 14 MG/DL (ref 9–23)
BUN/CREAT SERPL: 15.1 (ref 10–20)
CALCIUM BLD-MCNC: 9.2 MG/DL (ref 8.7–10.4)
CHLORIDE SERPL-SCNC: 103 MMOL/L (ref 98–112)
CO2 SERPL-SCNC: 28 MMOL/L (ref 21–32)
CREAT BLD-MCNC: 0.93 MG/DL (ref 0.7–1.3)
DEPRECATED RDW RBC AUTO: 43.1 FL (ref 35.1–46.3)
EGFRCR SERPLBLD CKD-EPI 2021: 95 ML/MIN/1.73M2 (ref 60–?)
EOSINOPHIL # BLD AUTO: 0.08 X10(3) UL (ref 0–0.7)
EOSINOPHIL NFR BLD AUTO: 1.6 %
ERYTHROCYTE [DISTWIDTH] IN BLOOD BY AUTOMATED COUNT: 12.2 % (ref 11–15)
GLOBULIN PLAS-MCNC: 2.5 G/DL (ref 2–3.5)
GLUCOSE BLD-MCNC: 271 MG/DL (ref 70–99)
HCT VFR BLD AUTO: 44.9 % (ref 39–53)
HGB BLD-MCNC: 15.2 G/DL (ref 13–17.5)
IMM GRANULOCYTES # BLD AUTO: 0.01 X10(3) UL (ref 0–1)
IMM GRANULOCYTES NFR BLD: 0.2 %
LYMPHOCYTES # BLD AUTO: 1.92 X10(3) UL (ref 1–4)
LYMPHOCYTES NFR BLD AUTO: 37.4 %
MCH RBC QN AUTO: 32.3 PG (ref 26–34)
MCHC RBC AUTO-ENTMCNC: 33.9 G/DL (ref 31–37)
MCV RBC AUTO: 95.3 FL (ref 80–100)
MONOCYTES # BLD AUTO: 0.56 X10(3) UL (ref 0.1–1)
MONOCYTES NFR BLD AUTO: 10.9 %
NEUTROPHILS # BLD AUTO: 2.48 X10 (3) UL (ref 1.5–7.7)
NEUTROPHILS # BLD AUTO: 2.48 X10(3) UL (ref 1.5–7.7)
NEUTROPHILS NFR BLD AUTO: 48.3 %
OSMOLALITY SERPL CALC.SUM OF ELEC: 290 MOSM/KG (ref 275–295)
PLATELET # BLD AUTO: 238 10(3)UL (ref 150–450)
POTASSIUM SERPL-SCNC: 4.4 MMOL/L (ref 3.5–5.1)
PROT SERPL-MCNC: 7 G/DL (ref 5.7–8.2)
RBC # BLD AUTO: 4.71 X10(6)UL (ref 4.3–5.7)
SODIUM SERPL-SCNC: 135 MMOL/L (ref 136–145)
TSI SER-ACNC: 3.8 UIU/ML (ref 0.55–4.78)
WBC # BLD AUTO: 5.1 X10(3) UL (ref 4–11)

## 2025-05-22 NOTE — PROGRESS NOTES
Cole arrives to clinic for C31 D1 Opdivo.  Arrives Ambulating independently, accompanied by Self     Patient was evaluated today by EMIR.    Oral medications included in this regimen:  no    Patient confirms comprehension of cancer treatment schedule:  yes    Pregnancy screening:  Not applicable    Modifications in dose or schedule:  No    Medications appearance and physical integrity checked by RN: yes.    Chemotherapy IV pump settings verified by 2 RNs:  No due to targeted therapy IV administration.  Frequency of blood return and site check throughout administration: Prior to administration and At completion of therapy     Infusion/treatment outcome:  patient tolerated treatment without incident    Education Record    Learner:  Patient  Barriers / Limitations:  Language  Method:  Reinforcement  Education / instructions given:  Medication, POC - no questions at this time  Outcome:  Shows understanding    Discharged Home, Ambulating independently, accompanied by:Self    Patient/family verbalized understanding of future appointments: by printed AVS

## 2025-05-22 NOTE — PROGRESS NOTES
HPI   Cole Kendrick is a 59 year old male here for follow up of   Encounter Diagnoses   Name Primary?    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    Oncology history:   Extensive outside records from multiple facilities since March 2021 were reviewed.  This is a synopsis of those records, details can be found on Care Everywhere.  Patient had presented at [facility] March 2021 due to a mass of the right shoulder.  At that time he underwent imaging with an MRI which showed a superficial right upper arm mass which appeared to be veins within the dermis.  It measured 3.9 cm x 8.3 cm x 5.9 cm it was felt that there was likely internal process of hemorrhage within the mass.  The mass demonstrated significant enhancement and it extended to the involved adjacent thickened areas of the dermis.  This mass was abutting the deltoid muscle and mildly effacing the deltoid muscle but not invading it.  There is also evidence of 13 mm short axis diameter right axillary lymph node which appeared to have a thickened cortex and was felt to be abnormal.  Dedicated ultrasound of the right axilla was recommended for further evaluation.  There were other smaller lymph nodes adjacent to the area.  Patient subsequently underwent right axillary lymph node ultrasound guided biopsy, the area contained multiple and large lymph nodes which were measuring between 3.5 and 3.2 cm.  There is at least 3 of them.  Pathology report showed lymph node tissue showing features consistent with nonspecific reactive lymphoid hyperplasia, there is no evidence of metastatic disease, nor a lymphoproliferative process or malignancy.     Patient was then referred to cancer center at [facility] in [facility], and was evaluated by her surgical oncologist Dr. Fabián Dawn.  Per review of his  consultation report, the patient had stated he had noted a new (mole) on the right arm 9 months prior to his original presentation and had enlarged over several months and began to bleed.  It was not painful and was not causing any neuromuscular issues.  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  We did discuss causes the results of the MRI above.  I was discussed for the patient to proceed with a radical resection of the right upper arm mass with placement of the wound VAC after performing the wide excision of the mass.  Surgical resection was performed on 5/26/2021 with en bloc resection of 12 cm with partial resection of the underlying right deltoid muscle.  Final wound measured 15 cm in diameter by 4 cm deep, and a wound VAC in place.  The pathology report showed that this was consistent with a malignant melanoma that measured 10.5 cm and was focally in the epidermis abutting ulcer with minute focus of possible melanoma in situ.  BRAF V600E was positive.  As the tumor staging summary, the histologic type was not determined.  The Breslow depth could not be determined.  There was ulceration present.  The mitotic rate was greater than 1/millimeter square.  Lymphovascular invasion was present.  Tumor infiltrating lymphocytes were present but nonbrisk.  There is no tumor regression noted.  The peripheral margins, and the deep margins were negative for invasive melanoma.  Tumor was 15 mm from the closest peripheral margin and 10 mm from the closest deep margin.  Plastic surgery was consulted for wound closure with skin grafting.  Patient underwent skin grafting on 6/8/2021.  Donor site was from the right thigh.     Patient was then seeking oncologic care in [facility] in [facility].  The records from that organization are not available on Care  Everywhere.  The patient states that he was receiving treatment with the local oncologist, Dr. Arce, with about 8 pills a day he was taking twice a day.  He states that he would pick them up at his doctor's office.  He states that he had had a PET scan which is showed positive lymph nodes in his right axilla.  On records available in care everywhere on 3/3/2022, there was a CT scan of the chest, abdomen and pelvis with contrast, which was compared to a PET/CT from September 16, 2021.  This states that on the prior PET/CT from September 2021 there had been enlarged right axillary lymphadenopathy that had measured 2.6 x 2.1 cm, and on the current study from March 3, 2022 it had resolved.  There was no other evidence seen of metastatic disease.  There was a 1 cm flash enhancing lesion in the right anterior lobe of the liver which was felt to be a hemangioma.     Then on July 25, 2022, it was noted that the patient had iron deficiency anemia.  At that time, on the note from the infusion center on 9/8/2022, at which time the patient was receiving Venofer infusions, the only medication listed on his med list was tramadol.  He did receive infusion of 300 mg of Venofer x6 doses as an outpatient.  Last dose was given on 10/27/2022.     On November 7, 2022, the patient received a blood transfusion for hemoglobin of 6.2, transfusion occurred on 11/8/2022.  Patient was then admitted on 12/21/2023 and discharged on 12/23/2023 to [facility], due to secondary acute anemia from blood loss secondary to GI bleed.  At that time he had presented with several weeks of dizziness, weakness, and left lower extremity swelling.  His oncologist recommended he go to the ED for evaluation.  He did a left lower extremity Doppler which was negative for DVT.  Hemoglobin at the time was 3.2.  Patient had complained of several weeks of painless red-maroon or black-colored stools which were not often.  He denied any abdominal  pain.  He denied any nausea or vomiting.  Patient received a total of 6 units of packed red cells and underwent an EGD which revealed 5 gastric ulcers with no active bleeding.  He was placed on 3 months of PPI therapy twice daily and was recommended for repeat EGD in 2 months.  At that time, the only medication listed was metformin.   Patient was then readmitted on 1/4/2023 and discharged on 1/6/2023, due to a recurrent GI bleed.  At that time, he had reported an abrupt onset of a sensation of fatigue, diaphoresis and dizziness, and then started having dark stools.  When he presented to the ED, with hypotension and hemoglobin of 8.1.  There was concern for recurrent GI bleed, however there is no evidence of overt GI bleed.  He was transfused 2 units of packed red cells, with hemoglobin up to 9.3 on day of discharge.  It was not recommended to repeat endoscopy at the time.  He was recommended to continue with the pantoprazole twice daily.  He had also had a tagged red cell scan during the first admission which was negative.       Patient presented back to his oncologist on 01/11/2023, with a hemoglobin of 6.5.  On 1/12/2023 he was then transfused 2 units of packed red cells at the outpatient infusion center at [facility].  Subsequent hemoglobin on 1/19/2023 was 7.1.  She did have repeat iron studies on 1/23/2023, with a ferritin of 12, percent iron saturation of 4%, LDH elevated to 364, with a total bilirubin of 0.4.  He again was managed with Venofer 300 mg infusions weekly x9 weeks.  With last dose administered on 5/4/2023.     Patient then had PET/CT on 2/02/2023, which at that time showed 2 thickened hypermetabolic small bowel loops with SUV max of 11.07 and 13.72.  They have felt that these findings were suspicious for an intussusception and a CT scan of the abdomen pelvis was recommended with and without contrast.  They still noted that there was no right axillary lymphadenopathy.     Patient was then  readmitted on 5/17/2023 through 5/18/2023, again due to acute on chronic blood loss anemia and received 2 units of packed red cells.  It was suspected to be an upper GI bleed and was referred for outpatient capsule endoscopy.  At the time of presentation on 5/17/2023 he had offered a 4-day history of black stools and dizziness in the morning.  When he presented to the ED his hemoglobin was 5.7.  After transfusion of 2 units of packed red cells his hemoglobin was up to 7.1.  Last hemoglobin on file as outpatient on 5/25/2023 was 8.4 with ferritin of 11.     Presented to [facility] ER on 6/28/23 with c/o weakness, epigastric pain and melanotic stools.  Hgb 4.6.  PMH c/w DM, gastric ulcers with h/o GI bleed s/p IV iron.  He underwent an EGD which did not show any evidence of upper GI bleeding. There were 2 small superficial ulcers/erosions in the duodenal bulb which were not felt to be the cause of the profound anemia with hemoglobin of less than 5 on admission. Patient was recommended to have a CT enterography due to PET/CT on 2/2/2023 with 2 thickened hypermetabolic masses and small bowel which were felt to be secondary to intussusception. Capsule endoscopy was to be considered pending results. CT enterography was performed on 6/29/2023, which showed an 8.7 cm area of masslike thickening involving a loop of small bowel within the right lower quadrant. There was an additional 7 cm area of masslike thickening involving a loop of small bowel within the left lower quadrant. These findings were felt to be suspicious of metastatic disease to small bowel given best history of melanoma.     S/p en bloc small bowel resection and partial o[facility]ectomy [facility] on 6/30/23 - two separate foci of metastatic melanoma, 7.5 cm and 6.5 cm, BRAF mutant.      S/p cycle 4 Ipilimumab with 3 mg/Nivolumab 1 mg.  Tolerated very well.    Currently s/p cycle 30 single agent nivolumab now at 480 mg every 4 weeks. Tolerating well.     Fatigue:   No    Fevers:  No    Appetite/taste changes:  No    Mucositis:  No    Weight changes:  No    Nausea/vomiting:  No    Diarrhea:  No    Constipation:  No    Peripheral neuropathy:  No    Follows with his PCP.  Was not started on DM meds.  Monitoring BP.    Lives in [place], [place].    Feeling well today. No c/o Abd pain today.      ECOG PS 0    Review of Systems:   Review of Systems   Constitutional:  Positive for unexpected weight change (2 lbs down). Negative for fatigue and fever.   Eyes:  Negative for eye problems.   Respiratory:  Negative for cough and shortness of breath.    Cardiovascular:  Negative for chest pain, leg swelling and palpitations.   Gastrointestinal:  Negative for abdominal pain and blood in stool.   Endocrine:        No heat or cold intolerance   Genitourinary:  Negative for difficulty urinating, dysuria, frequency and hematuria.    Musculoskeletal:  Negative for arthralgias, back pain and neck pain.   Skin:  Negative for itching and rash.   Neurological:  Negative for dizziness, extremity weakness and headaches.   Hematological:  Negative for adenopathy. Does not bruise/bleed easily.   Psychiatric/Behavioral:  Negative for sleep disturbance.          No current outpatient medications on file.     Allergies:   No Known Allergies    Past Medical History:    Anemia    with multiple blood transfusion    GIB (gastrointestinal bleeding)    Melanoma (HCC)    right shoulder s/p surgical excision and skin graft, adjuvant BRAF/MEK inhibitors for 1.5 yrs    Personal history of antineoplastic chemotherapy     Past Surgical History:   Procedure Laterality Date    Other surgical history      small intestine surgery    Upper gi endoscopy,exam  12/01/2022     Social History     Socioeconomic History    Marital status: Single   Tobacco Use    Smoking status: Never    Smokeless tobacco: Never   Vaping Use    Vaping status: Never Used   Substance and Sexual Activity    Alcohol use: Not Currently    Drug use: Never        No family history on file.      PHYSICAL EXAM:    /81 (BP Location: Right arm, Patient Position: Sitting, Cuff Size: large)   Pulse 61   Temp 97.8 °F (36.6 °C) (Tympanic)   Resp 18   Ht 1.702 m (5' 7\")   Wt 106.4 kg (234 lb 9.6 oz)   SpO2 97%   BMI 36.74 kg/m²   Wt Readings from Last 6 Encounters:   04/24/25 106.1 kg (234 lb)   03/27/25 107 kg (236 lb)   02/27/25 108.4 kg (239 lb)   01/30/25 105.2 kg (232 lb)   01/02/25 102.2 kg (225 lb 6.4 oz)   12/05/24 104.3 kg (230 lb)     Physical Exam  General: Patient is alert, not in acute distress.    HEENT: EOMs intact. PERRL, Anicteric.  MMM.   Neck: No JVD. No palpable lymphadenopathy. Neck is supple, surgical scar.  Chest: Clear to auscultation.  Heart: Regular rate and rhythm.   Abdomen: Soft, non tender with good bowel sounds.  Surgical scars. RUQ discomfort mild- none today   Extremities: No edema.  Neurological: Grossly intact.   Psych/Depression: nl        ASSESSMENT/PLAN:     1. Malignant melanoma of right upper extremity including shoulder (HCC)    2. 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 Encounter for antineoplastic immunotherapy        Malignant melanoma of right upper extremity including shoulder (HCC)  Proceed with treatment with nivolumab and ipilimumab x 4 cycles followed by maintenance nivolumab for total of 18 to 24 months.  Discussed goal of treatment is still potentially curative with these agents.      S/p cycle 4 Ipilimumab 3 mg/Nivolumab 1 mg.      Tumor assessment after the fourth cycle of treatment with marked response to therapy.  Tumor assessment after cycle 10 with very minimal activity in the LN in the abdomen.  Most recent restaging study was April 11, 2024 with stable mesenteric lymph node in the right mid abdomen with mild FDG activity compatible with stable treated disease.  Also patient had nonenlarged right paratracheal node measuring 7 mm which is mildly active which was likely reactive.  This will be  followed on upcoming PET/CT 2024.    S/p cycle 30 maintenance nivolumab 480mg every 4 weeks.       Proceed with cycle 31.  Given that the patient lives far, and FDA approved to the 480 mg every 4 weeks nivolumab, will switch his completion of his treatment to this dosing and schedule given his distance to the treatment center.    Restaging PET/CT on 10/24/2024 with no evidence of disease.  He has a borderline right paratracheal lymphadenopathy which is mildly hypermetabolic and is unchanged.      Reviewed PET scan stable Jan 2025.  Next due in 4 months, May of 2025. Order given - scheduled    Recommend weight loss.       HTN/DM/high cholesterol; discussed with patient.  He will follow with PCP if further issues.  Sent message to PCP about lack of insurance and prescribing meds he can afford and use of programs to cover these. .    Applying for assistance for metformin thru PCP.     Goal is to complete a total of 18 months to 24 months of treatment from initiation of therapy.  24 months will be in August of 2025.    No orders of the defined types were placed in this encounter.    Call prn.  Follow-up in 4 weeks    MDM: high risk    Results From Past 48 Hours:  Recent Results (from the past 48 hours)   CBC W Differential W Platelet    Collection Time: 05/22/25  7:08 AM   Result Value Ref Range    WBC 5.1 4.0 - 11.0 x10(3) uL    RBC 4.71 4.30 - 5.70 x10(6)uL    HGB 15.2 13.0 - 17.5 g/dL    HCT 44.9 39.0 - 53.0 %    MCV 95.3 80.0 - 100.0 fL    MCH 32.3 26.0 - 34.0 pg    MCHC 33.9 31.0 - 37.0 g/dL    RDW-SD 43.1 35.1 - 46.3 fL    RDW 12.2 11.0 - 15.0 %    .0 150.0 - 450.0 10(3)uL    Neutrophil Absolute Prelim 2.48 1.50 - 7.70 x10 (3) uL    Neutrophil Absolute 2.48 1.50 - 7.70 x10(3) uL    Lymphocyte Absolute 1.92 1.00 - 4.00 x10(3) uL    Monocyte Absolute 0.56 0.10 - 1.00 x10(3) uL    Eosinophil Absolute 0.08 0.00 - 0.70 x10(3) uL    Basophil Absolute 0.08 0.00 - 0.20 x10(3) uL    Immature Granulocyte Absolute  0.01 0.00 - 1.00 x10(3) uL    Neutrophil % 48.3 %    Lymphocyte % 37.4 %    Monocyte % 10.9 %    Eosinophil % 1.6 %    Basophil % 1.6 %    Immature Granulocyte % 0.2 %   Comp Metabolic Panel (14)    Collection Time: 05/22/25  7:08 AM   Result Value Ref Range    Glucose 271 (H) 70 - 99 mg/dL    Sodium 135 (L) 136 - 145 mmol/L    Potassium 4.4 3.5 - 5.1 mmol/L    Chloride 103 98 - 112 mmol/L    CO2 28.0 21.0 - 32.0 mmol/L    Anion Gap 4 0 - 18 mmol/L    BUN 14 9 - 23 mg/dL    Creatinine 0.93 0.70 - 1.30 mg/dL    BUN/CREA Ratio 15.1 10.0 - 20.0    Calcium, Total 9.2 8.7 - 10.4 mg/dL    Calculated Osmolality 290 275 - 295 mOsm/kg    eGFR-Cr 95 >=60 mL/min/1.73m2    ALT 30 10 - 49 U/L    AST 17 <34 U/L    Alkaline Phosphatase 92 45 - 117 U/L    Bilirubin, Total 0.6 0.3 - 1.2 mg/dL    Total Protein 7.0 5.7 - 8.2 g/dL    Albumin 4.5 3.2 - 4.8 g/dL    Globulin  2.5 2.0 - 3.5 g/dL    A/G Ratio 1.8 1.0 - 2.0    Patient Fasting for CMP? Patient not present    TSH W REFLEX TO FREE T4 [E]    Collection Time: 05/22/25  7:08 AM   Result Value Ref Range    TSH 3.804 0.550 - 4.780 uIU/mL

## 2025-06-03 ENCOUNTER — SOCIAL WORK SERVICES (OUTPATIENT)
Age: 60
End: 2025-06-03

## 2025-06-03 NOTE — PROGRESS NOTES
SW received response from the financial assistance department on status patient's renewal application. Patient has been approved under the uninsured financial assistance program starting 10/26/24 through 10/21/25. Approval letter scanned into patients documents and forwarded to patient's MyChart.

## 2025-06-12 ENCOUNTER — HOSPITAL ENCOUNTER (OUTPATIENT)
Dept: NUCLEAR MEDICINE | Facility: HOSPITAL | Age: 60
Discharge: HOME | End: 2025-06-12
Attending: NURSE PRACTITIONER

## 2025-06-12 DIAGNOSIS — C78.89 MELANOMA METASTATIC TO DIGESTIVE ORGAN (HCC): ICD-10-CM

## 2025-06-12 DIAGNOSIS — C43.61 MALIGNANT MELANOMA OF RIGHT UPPER EXTREMITY INCLUDING SHOULDER (HCC): ICD-10-CM

## 2025-06-12 LAB
GLUCOSE BLDC GLUCOMTR-MCNC: 202 MG/DL (ref 70–99)
GLUCOSE BLDC GLUCOMTR-MCNC: 220 MG/DL (ref 70–99)

## 2025-06-12 PROCEDURE — 82962 GLUCOSE BLOOD TEST: CPT

## 2025-06-12 PROCEDURE — 78816 PET IMAGE W/CT FULL BODY: CPT | Performed by: NURSE PRACTITIONER

## 2025-06-19 ENCOUNTER — OFFICE VISIT (OUTPATIENT)
Age: 60
End: 2025-06-19
Attending: NURSE PRACTITIONER

## 2025-06-19 ENCOUNTER — OFFICE VISIT (OUTPATIENT)
Age: 60
End: 2025-06-19
Attending: INTERNAL MEDICINE

## 2025-06-19 ENCOUNTER — NURSE ONLY (OUTPATIENT)
Age: 60
End: 2025-06-19
Attending: INTERNAL MEDICINE

## 2025-06-19 VITALS
RESPIRATION RATE: 18 BRPM | DIASTOLIC BLOOD PRESSURE: 77 MMHG | WEIGHT: 234 LBS | OXYGEN SATURATION: 97 % | BODY MASS INDEX: 36.73 KG/M2 | TEMPERATURE: 98 F | HEART RATE: 59 BPM | SYSTOLIC BLOOD PRESSURE: 123 MMHG | HEIGHT: 67 IN

## 2025-06-19 DIAGNOSIS — C78.89 MELANOMA METASTATIC TO DIGESTIVE ORGAN (HCC): ICD-10-CM

## 2025-06-19 DIAGNOSIS — Z51.12 ENCOUNTER FOR ANTINEOPLASTIC IMMUNOTHERAPY: ICD-10-CM

## 2025-06-19 DIAGNOSIS — C43.61 MALIGNANT MELANOMA OF RIGHT UPPER EXTREMITY INCLUDING SHOULDER (HCC): Primary | ICD-10-CM

## 2025-06-19 LAB
ALBUMIN SERPL-MCNC: 4.5 G/DL (ref 3.2–4.8)
ALBUMIN/GLOB SERPL: 2 {RATIO} (ref 1–2)
ALP LIVER SERPL-CCNC: 87 U/L (ref 45–117)
ALT SERPL-CCNC: 38 U/L (ref 10–49)
ANION GAP SERPL CALC-SCNC: 6 MMOL/L (ref 0–18)
AST SERPL-CCNC: 22 U/L (ref ?–34)
BASOPHILS # BLD AUTO: 0.08 X10(3) UL (ref 0–0.2)
BASOPHILS NFR BLD AUTO: 1.5 %
BILIRUB SERPL-MCNC: 0.6 MG/DL (ref 0.2–1.1)
BUN BLD-MCNC: 16 MG/DL (ref 9–23)
BUN/CREAT SERPL: 18.2 (ref 10–20)
CALCIUM BLD-MCNC: 9.1 MG/DL (ref 8.7–10.4)
CHLORIDE SERPL-SCNC: 103 MMOL/L (ref 98–112)
CO2 SERPL-SCNC: 26 MMOL/L (ref 21–32)
CREAT BLD-MCNC: 0.88 MG/DL (ref 0.7–1.3)
DEPRECATED RDW RBC AUTO: 41.6 FL (ref 35.1–46.3)
EGFRCR SERPLBLD CKD-EPI 2021: 98 ML/MIN/1.73M2 (ref 60–?)
EOSINOPHIL # BLD AUTO: 0.06 X10(3) UL (ref 0–0.7)
EOSINOPHIL NFR BLD AUTO: 1.1 %
ERYTHROCYTE [DISTWIDTH] IN BLOOD BY AUTOMATED COUNT: 11.9 % (ref 11–15)
GLOBULIN PLAS-MCNC: 2.3 G/DL (ref 2–3.5)
GLUCOSE BLD-MCNC: 247 MG/DL (ref 70–99)
HCT VFR BLD AUTO: 43.7 % (ref 39–53)
HGB BLD-MCNC: 15 G/DL (ref 13–17.5)
IMM GRANULOCYTES # BLD AUTO: 0.02 X10(3) UL (ref 0–1)
IMM GRANULOCYTES NFR BLD: 0.4 %
LYMPHOCYTES # BLD AUTO: 2.21 X10(3) UL (ref 1–4)
LYMPHOCYTES NFR BLD AUTO: 40.8 %
MCH RBC QN AUTO: 32.6 PG (ref 26–34)
MCHC RBC AUTO-ENTMCNC: 34.3 G/DL (ref 31–37)
MCV RBC AUTO: 95 FL (ref 80–100)
MONOCYTES # BLD AUTO: 0.57 X10(3) UL (ref 0.1–1)
MONOCYTES NFR BLD AUTO: 10.5 %
NEUTROPHILS # BLD AUTO: 2.48 X10 (3) UL (ref 1.5–7.7)
NEUTROPHILS # BLD AUTO: 2.48 X10(3) UL (ref 1.5–7.7)
NEUTROPHILS NFR BLD AUTO: 45.7 %
OSMOLALITY SERPL CALC.SUM OF ELEC: 289 MOSM/KG (ref 275–295)
PLATELET # BLD AUTO: 213 10(3)UL (ref 150–450)
POTASSIUM SERPL-SCNC: 4.3 MMOL/L (ref 3.5–5.1)
PROT SERPL-MCNC: 6.8 G/DL (ref 5.7–8.2)
RBC # BLD AUTO: 4.6 X10(6)UL (ref 4.3–5.7)
SODIUM SERPL-SCNC: 135 MMOL/L (ref 136–145)
TSI SER-ACNC: 3.8 UIU/ML (ref 0.55–4.78)
WBC # BLD AUTO: 5.4 X10(3) UL (ref 4–11)

## 2025-06-19 NOTE — PROGRESS NOTES
Cole to infusion today for C32 D1 opdivo. Arrives Ambulating independently, accompanied by Self     Patient was evaluated today by EMIR.    Oral medications included in this regimen:  no    Patient confirms comprehension of cancer treatment schedule:  yes    Pregnancy screening:  Not applicable    Modifications in dose or schedule:  No    Medications appearance and physical integrity checked by RN: yes.    Chemotherapy IV pump settings verified by 2 RNs:  No due to targeted therapy IV administration.  Frequency of blood return and site check throughout administration: Prior to administration, Prior to each drug, and At completion of therapy     Infusion/treatment outcome:  patient tolerated treatment without incident    Education Record    Learner:  Patient  Barriers / Limitations:  None  Method:  Reinforcement  Education / instructions given:  Plan of care  Outcome:  Shows understanding    Discharged Home, Ambulating independently, accompanied by:Self    Patient/family verbalized understanding of future appointments: by printed AVS

## 2025-06-19 NOTE — PROGRESS NOTES
HPI   Cole Kendrick is a 60 year old male here for follow up of   Encounter Diagnoses   Name Primary?    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  This is a synopsis of those records, details can be found on Care Everywhere.  Patient had presented at [facility] in [facility] March 2021 due to a mass of the right shoulder.  At that time he underwent imaging with an MRI which showed a superficial right upper arm mass which appeared to be veins within the dermis.  It measured 3.9 cm x 8.3 cm x 5.9 cm it was felt that there was likely internal process of hemorrhage within the mass.  The mass demonstrated significant enhancement and it extended to the involved adjacent thickened areas of the dermis.  This mass was abutting the deltoid muscle and mildly effacing the deltoid muscle but not invading it.  There is also evidence of 13 mm short axis diameter right axillary lymph node which appeared to have a thickened cortex and was felt to be abnormal.  Dedicated ultrasound of the right axilla was recommended for further evaluation.  There were other smaller lymph nodes adjacent to the area.  Patient subsequently underwent right axillary lymph node ultrasound guided biopsy, the area contained multiple and large lymph nodes which were measuring between 3.5 and 3.2 cm.  There is at least 3 of them.  Pathology report showed lymph node tissue showing features consistent with nonspecific reactive lymphoid hyperplasia, there is no evidence of metastatic disease, nor a lymphoproliferative process or malignancy.     Patient was then referred to cancer center at [facility] in [facility], and was evaluated by her surgical oncologist Dr. Fabián Dawn.  Per review of his  consultation report, the patient had stated he had noted a new (mole) on the right arm 9 months prior to his original presentation and had enlarged over several months and began to bleed.  It was not painful and was not causing any neuromuscular issues.  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  We did discuss causes the results of the MRI above.  I was discussed for the patient to proceed with a radical resection of the right upper arm mass with placement of the wound VAC after performing the wide excision of the mass.  Surgical resection was performed on 5/26/2021 with en bloc resection of 12 cm with partial resection of the underlying right deltoid muscle.  Final wound measured 15 cm in diameter by 4 cm deep, and a wound VAC in place.  The pathology report showed that this was consistent with a malignant melanoma that measured 10.5 cm and was focally in the epidermis abutting ulcer with minute focus of possible melanoma in situ.  BRAF V600E was positive.  As the tumor staging summary, the histologic type was not determined.  The Breslow depth could not be determined.  There was ulceration present.  The mitotic rate was greater than 1/millimeter square.  Lymphovascular invasion was present.  Tumor infiltrating lymphocytes were present but nonbrisk.  There is no tumor regression noted.  The peripheral margins, and the deep margins were negative for invasive melanoma.  Tumor was 15 mm from the closest peripheral margin and 10 mm from the closest deep margin.  Plastic surgery was consulted for wound closure with skin grafting.  Patient underwent skin grafting on 6/8/2021.  Donor site was from the right thigh.     Patient was then seeking oncologic care in [facility] in [facility].  The records from that organization are not available on Care  Everywhere.  The patient states that he was receiving treatment with the local oncologist, Dr. Arce, with about 8 pills a day he was taking twice a day.  He states that he would pick them up at his doctor's office.  He states that he had had a PET scan which is showed positive lymph nodes in his right axilla.  On records available in care everywhere on 3/3/2022, there was a CT scan of the chest, abdomen and pelvis with contrast, which was compared to a PET/CT from September 16, 2021.  This states that on the prior PET/CT from September 2021 there had been enlarged right axillary lymphadenopathy that had measured 2.6 x 2.1 cm, and on the current study from March 3, 2022 it had resolved.  There was no other evidence seen of metastatic disease.  There was a 1 cm flash enhancing lesion in the right anterior lobe of the liver which was felt to be a hemangioma.     Then on July 25, 2022, it was noted that the patient had iron deficiency anemia.  At that time, on the note from the infusion center on 9/8/2022, at which time the patient was receiving Venofer infusions, the only medication listed on his med list was tramadol.  He did receive infusion of 300 mg of Venofer x6 doses as an outpatient.  Last dose was given on 10/27/2022.     On November 7, 2022, the patient received a blood transfusion for hemoglobin of 6.2, transfusion occurred on 11/8/2022.  Patient was then admitted on 12/21/2023 and discharged on 12/23/2023 to [facility], due to secondary acute anemia from blood loss secondary to GI bleed.  At that time he had presented with several weeks of dizziness, weakness, and left lower extremity swelling.  His oncologist recommended he go to the ED for evaluation.  He did a left lower extremity Doppler which was negative for DVT.  Hemoglobin at the time was 3.2.  Patient had complained of several weeks of painless red-maroon or black-colored stools which were not often.  He denied any abdominal  pain.  He denied any nausea or vomiting.  Patient received a total of 6 units of packed red cells and underwent an EGD which revealed 5 gastric ulcers with no active bleeding.  He was placed on 3 months of PPI therapy twice daily and was recommended for repeat EGD in 2 months.  At that time, the only medication listed was metformin.   Patient was then readmitted on 1/4/2023 and discharged on 1/6/2023, due to a recurrent GI bleed.  At that time, he had reported an abrupt onset of a sensation of fatigue, diaphoresis and dizziness, and then started having dark stools.  When he presented to the ED, with hypotension and hemoglobin of 8.1.  There was concern for recurrent GI bleed, however there is no evidence of overt GI bleed.  He was transfused 2 units of packed red cells, with hemoglobin up to 9.3 on day of discharge.  It was not recommended to repeat endoscopy at the time.  He was recommended to continue with the pantoprazole twice daily.  He had also had a tagged red cell scan during the first admission which was negative.       Patient presented back to his oncologist on 01/11/2023, with a hemoglobin of 6.5.  On 1/12/2023 he was then transfused 2 units of packed red cells at the outpatient infusion center at [facility].  Subsequent hemoglobin on 1/19/2023 was 7.1.  She did have repeat iron studies on 1/23/2023, with a ferritin of 12, percent iron saturation of 4%, LDH elevated to 364, with a total bilirubin of 0.4.  He again was managed with Venofer 300 mg infusions weekly x9 weeks.  With last dose administered on 5/4/2023.     Patient then had PET/CT on 2/02/2023, which at that time showed 2 thickened hypermetabolic small bowel loops with SUV max of 11.07 and 13.72.  They have felt that these findings were suspicious for an intussusception and a CT scan of the abdomen pelvis was recommended with and without contrast.  They still noted that there was no right axillary lymphadenopathy.     Patient was then  readmitted on 5/17/2023 through 5/18/2023, again due to acute on chronic blood loss anemia and received 2 units of packed red cells.  It was suspected to be an upper GI bleed and was referred for outpatient capsule endoscopy.  At the time of presentation on 5/17/2023 he had offered a 4-day history of black stools and dizziness in the morning.  When he presented to the ED his hemoglobin was 5.7.  After transfusion of 2 units of packed red cells his hemoglobin was up to 7.1.  Last hemoglobin on file as outpatient on 5/25/2023 was 8.4 with ferritin of 11.     Presented to [facility] ER on 6/28/23 with c/o weakness, epigastric pain and melanotic stools.  Hgb 4.6.  PMH c/w DM, gastric ulcers with h/o GI bleed s/p IV iron.  He underwent an EGD which did not show any evidence of upper GI bleeding. There were 2 small superficial ulcers/erosions in the duodenal bulb which were not felt to be the cause of the profound anemia with hemoglobin of less than 5 on admission. Patient was recommended to have a CT enterography due to PET/CT on 2/2/2023 with 2 thickened hypermetabolic masses and small bowel which were felt to be secondary to intussusception. Capsule endoscopy was to be considered pending results. CT enterography was performed on 6/29/2023, which showed an 8.7 cm area of masslike thickening involving a loop of small bowel within the right lower quadrant. There was an additional 7 cm area of masslike thickening involving a loop of small bowel within the left lower quadrant. These findings were felt to be suspicious of metastatic disease to small bowel given best history of melanoma.     S/p en bloc small bowel resection and [facility] on 6[facility]30/23 - two separate foci of metastatic melanoma, 7.5 cm and 6.5 cm, BRAF mutant.      S/p cycle 4 Ipilimumab with 3 mg/Nivolumab 1 mg.  Tolerated very well.    Currently s/p cycle 31 single agent nivolumab now at 480 mg every 4 weeks. Tolerating well.     Fatigue:   No    Fevers:  No    Appetite/taste changes:  No    Mucositis:  No    Weight changes:  No    Nausea/vomiting:  No    Diarrhea:  No    Constipation:  No    Peripheral neuropathy:  No    Follows with his PCP.  Was not started on DM meds.  Monitoring BP.    Lives in [place], [place].    Feeling well today. Without any acute complaints.    ECOG PS 0    Review of Systems:   ROS reviewed and negative except as dictated per HPI       No current outpatient medications on file.     Allergies:   No Known Allergies    Past Medical History:    Anemia    with multiple blood transfusion    GIB (gastrointestinal bleeding)    Melanoma (HCC)    right shoulder s/p surgical excision and skin graft, adjuvant BRAF/MEK inhibitors for 1.5 yrs    Personal history of antineoplastic chemotherapy     Past Surgical History:   Procedure Laterality Date    Other surgical history      small intestine surgery    Upper gi endoscopy,exam  12/01/2022     Social History     Socioeconomic History    Marital status: Single   Tobacco Use    Smoking status: Never    Smokeless tobacco: Never   Vaping Use    Vaping status: Never Used   Substance and Sexual Activity    Alcohol use: Not Currently    Drug use: Never       No family history on file.      PHYSICAL EXAM:    /77 (BP Location: Left arm, Patient Position: Sitting, Cuff Size: large)   Pulse 59   Temp 98 °F (36.7 °C) (Tympanic)   Resp 18   Ht 1.702 m (5' 7\")   Wt 106.1 kg (234 lb)   SpO2 97%   BMI 36.65 kg/m²   Wt Readings from Last 6 Encounters:   06/19/25 106.1 kg (234 lb)   05/22/25 106.4 kg (234 lb 9.6 oz)   04/24/25 106.1 kg (234 lb)   03/27/25 107 kg (236 lb)   02/27/25 108.4 kg (239 lb)   01/30/25 105.2 kg (232 lb)     Physical Exam  General: Patient is alert, not in acute distress.    HEENT: EOMs intact.   Neck: No JVD.   Chest: CTAB, normal work of breathing  Heart: Regular rate and rhythm.   Abdomen: Soft, non tender with good bowel sounds  Extremities: No edema.  Neurological:  Grossly intact.   Psych/Depression: nl        ASSESSMENT/PLAN:     1. Malignant melanoma of right upper extremity including shoulder (HCC)    2. 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 Encounter for antineoplastic immunotherapy          Malignant melanoma of right upper extremity including shoulder (HCC)  Proceed with treatment with nivolumab and ipilimumab x 4 cycles followed by maintenance nivolumab for total of 18 to 24 months.  Discussed goal of treatment is still potentially curative with these agents.      S/p cycle 4 Ipilimumab 3 mg/Nivolumab 1 mg.      Tumor assessment after the fourth cycle of treatment with marked response to therapy.  Tumor assessment after cycle 10 with very minimal activity in the LN in the abdomen.  Most recent restaging study was April 11, 2024 with stable mesenteric lymph node in the right mid abdomen with mild FDG activity compatible with stable treated disease.  Also patient had nonenlarged right paratracheal node measuring 7 mm which is mildly active which was likely reactive.  This will be followed on upcoming PET/CT 2024.    S/p cycle 30 maintenance nivolumab 480mg every 4 weeks.       Proceed with cycle 32.  Given that the patient lives far, and FDA approved to the 480 mg every 4 weeks nivolumab, will switch his completion of his treatment to this dosing and schedule given his distance to the treatment center.    Restaging PET/CT on 10/24/2024 with no evidence of disease.  He has a borderline right paratracheal lymphadenopathy which is mildly hypermetabolic and is unchanged.      Reviewed PET scan stable Jan 2025.      PET scan June 2025 stable      Recommend weight loss.     HTN/DM/high cholesterol; discussed with patient.  He will follow with PCP if further issues.  Sent message to PCP about lack of insurance and prescribing meds he can afford and use of programs to cover these. .    Applying for assistance for metformin thru PCP.     Goal is to complete a total of  18 months to 24 months of treatment from initiation of therapy.  24 months will be in August of 2025.      Call prn.  Follow-up in 4 weeks    MDM: high risk    Results From Past 48 Hours:  Recent Results (from the past 48 hours)   CBC W Differential W Platelet    Collection Time: 06/19/25  7:35 AM   Result Value Ref Range    WBC 5.4 4.0 - 11.0 x10(3) uL    RBC 4.60 4.30 - 5.70 x10(6)uL    HGB 15.0 13.0 - 17.5 g/dL    HCT 43.7 39.0 - 53.0 %    MCV 95.0 80.0 - 100.0 fL    MCH 32.6 26.0 - 34.0 pg    MCHC 34.3 31.0 - 37.0 g/dL    RDW-SD 41.6 35.1 - 46.3 fL    RDW 11.9 11.0 - 15.0 %    .0 150.0 - 450.0 10(3)uL    Neutrophil Absolute Prelim 2.48 1.50 - 7.70 x10 (3) uL    Neutrophil Absolute 2.48 1.50 - 7.70 x10(3) uL    Lymphocyte Absolute 2.21 1.00 - 4.00 x10(3) uL    Monocyte Absolute 0.57 0.10 - 1.00 x10(3) uL    Eosinophil Absolute 0.06 0.00 - 0.70 x10(3) uL    Basophil Absolute 0.08 0.00 - 0.20 x10(3) uL    Immature Granulocyte Absolute 0.02 0.00 - 1.00 x10(3) uL    Neutrophil % 45.7 %    Lymphocyte % 40.8 %    Monocyte % 10.5 %    Eosinophil % 1.1 %    Basophil % 1.5 %    Immature Granulocyte % 0.4 %   Comp Metabolic Panel (14)    Collection Time: 06/19/25  7:35 AM   Result Value Ref Range    Glucose 247 (H) 70 - 99 mg/dL    Sodium 135 (L) 136 - 145 mmol/L    Potassium 4.3 3.5 - 5.1 mmol/L    Chloride 103 98 - 112 mmol/L    CO2 26.0 21.0 - 32.0 mmol/L    Anion Gap 6 0 - 18 mmol/L    BUN 16 9 - 23 mg/dL    Creatinine 0.88 0.70 - 1.30 mg/dL    BUN/CREA Ratio 18.2 10.0 - 20.0    Calcium, Total 9.1 8.7 - 10.4 mg/dL    Calculated Osmolality 289 275 - 295 mOsm/kg    eGFR-Cr 98 >=60 mL/min/1.73m2    ALT 38 10 - 49 U/L    AST 22 <34 U/L    Alkaline Phosphatase 87 45 - 117 U/L    Bilirubin, Total 0.6 0.2 - 1.1 mg/dL    Total Protein 6.8 5.7 - 8.2 g/dL    Albumin 4.5 3.2 - 4.8 g/dL    Globulin  2.3 2.0 - 3.5 g/dL    A/G Ratio 2.0 1.0 - 2.0    Patient Fasting for CMP? Patient not present    TSH W REFLEX TO FREE T4 [E]     Collection Time: 06/19/25  7:35 AM   Result Value Ref Range    TSH 3.796 0.550 - 4.780 uIU/mL

## 2025-07-17 ENCOUNTER — OFFICE VISIT (OUTPATIENT)
Age: 60
End: 2025-07-17
Attending: INTERNAL MEDICINE

## 2025-07-17 ENCOUNTER — NURSE ONLY (OUTPATIENT)
Age: 60
End: 2025-07-17
Attending: INTERNAL MEDICINE

## 2025-07-17 ENCOUNTER — OFFICE VISIT (OUTPATIENT)
Age: 60
End: 2025-07-17
Attending: NURSE PRACTITIONER

## 2025-07-17 VITALS
HEIGHT: 67 IN | RESPIRATION RATE: 18 BRPM | DIASTOLIC BLOOD PRESSURE: 74 MMHG | BODY MASS INDEX: 36.41 KG/M2 | SYSTOLIC BLOOD PRESSURE: 119 MMHG | HEART RATE: 55 BPM | OXYGEN SATURATION: 97 % | TEMPERATURE: 97 F | WEIGHT: 232 LBS

## 2025-07-17 DIAGNOSIS — C78.89 MELANOMA METASTATIC TO DIGESTIVE ORGAN (HCC): ICD-10-CM

## 2025-07-17 DIAGNOSIS — Z51.12 ENCOUNTER FOR ANTINEOPLASTIC IMMUNOTHERAPY: ICD-10-CM

## 2025-07-17 DIAGNOSIS — C43.61 MALIGNANT MELANOMA OF RIGHT UPPER EXTREMITY INCLUDING SHOULDER (HCC): Primary | ICD-10-CM

## 2025-07-17 LAB
ALBUMIN SERPL-MCNC: 4.4 G/DL (ref 3.2–4.8)
ALBUMIN/GLOB SERPL: 1.8 {RATIO} (ref 1–2)
ALP LIVER SERPL-CCNC: 86 U/L (ref 45–117)
ALT SERPL-CCNC: 33 U/L (ref 10–49)
ANION GAP SERPL CALC-SCNC: 7 MMOL/L (ref 0–18)
AST SERPL-CCNC: 23 U/L (ref ?–34)
BASOPHILS # BLD AUTO: 0.08 X10(3) UL (ref 0–0.2)
BASOPHILS NFR BLD AUTO: 1.5 %
BILIRUB SERPL-MCNC: 0.6 MG/DL (ref 0.2–1.1)
BUN BLD-MCNC: 14 MG/DL (ref 9–23)
BUN/CREAT SERPL: 15.2 (ref 10–20)
CALCIUM BLD-MCNC: 9 MG/DL (ref 8.7–10.4)
CHLORIDE SERPL-SCNC: 102 MMOL/L (ref 98–112)
CO2 SERPL-SCNC: 28 MMOL/L (ref 21–32)
CREAT BLD-MCNC: 0.92 MG/DL (ref 0.7–1.3)
DEPRECATED RDW RBC AUTO: 42.4 FL (ref 35.1–46.3)
EGFRCR SERPLBLD CKD-EPI 2021: 95 ML/MIN/1.73M2 (ref 60–?)
EOSINOPHIL # BLD AUTO: 0.05 X10(3) UL (ref 0–0.7)
EOSINOPHIL NFR BLD AUTO: 1 %
ERYTHROCYTE [DISTWIDTH] IN BLOOD BY AUTOMATED COUNT: 12.1 % (ref 11–15)
GLOBULIN PLAS-MCNC: 2.4 G/DL (ref 2–3.5)
GLUCOSE BLD-MCNC: 244 MG/DL (ref 70–99)
HCT VFR BLD AUTO: 44.6 % (ref 39–53)
HGB BLD-MCNC: 15.4 G/DL (ref 13–17.5)
IMM GRANULOCYTES # BLD AUTO: 0.01 X10(3) UL (ref 0–1)
IMM GRANULOCYTES NFR BLD: 0.2 %
LYMPHOCYTES # BLD AUTO: 2.17 X10(3) UL (ref 1–4)
LYMPHOCYTES NFR BLD AUTO: 41.3 %
MCH RBC QN AUTO: 32.5 PG (ref 26–34)
MCHC RBC AUTO-ENTMCNC: 34.5 G/DL (ref 31–37)
MCV RBC AUTO: 94.1 FL (ref 80–100)
MONOCYTES # BLD AUTO: 0.59 X10(3) UL (ref 0.1–1)
MONOCYTES NFR BLD AUTO: 11.2 %
NEUTROPHILS # BLD AUTO: 2.35 X10 (3) UL (ref 1.5–7.7)
NEUTROPHILS # BLD AUTO: 2.35 X10(3) UL (ref 1.5–7.7)
NEUTROPHILS NFR BLD AUTO: 44.8 %
OSMOLALITY SERPL CALC.SUM OF ELEC: 293 MOSM/KG (ref 275–295)
PLATELET # BLD AUTO: 203 10(3)UL (ref 150–450)
POTASSIUM SERPL-SCNC: 4.4 MMOL/L (ref 3.5–5.1)
PROT SERPL-MCNC: 6.8 G/DL (ref 5.7–8.2)
RBC # BLD AUTO: 4.74 X10(6)UL (ref 4.3–5.7)
SODIUM SERPL-SCNC: 137 MMOL/L (ref 136–145)
TSI SER-ACNC: 4.31 UIU/ML (ref 0.55–4.78)
WBC # BLD AUTO: 5.3 X10(3) UL (ref 4–11)

## 2025-07-17 NOTE — PROGRESS NOTES
Pt here for C33D1 Drug(s)OPDIVO.  Arrives Ambulating independently, accompanied by Self      Patient was evaluated today by EMIR and Treatment Nurse.     L AC PIV from lab C/D/I, good blood return noted.     Oral medications included in this regimen:  no     Patient confirms comprehension of cancer treatment schedule:  yes     Pregnancy screening:  Not applicable     Modifications in dose or schedule:  No     Medications appearance and physical integrity checked by RN: yes.     Chemotherapy IV pump settings verified by 2 RNs:  No due to targeted therapy IV administration. Filter used.  Frequency of blood return and site check throughout administration: Prior to administration, Prior to each drug, and At completion of therapy      Infusion/treatment outcome:  patient tolerated treatment without incident     PIV removed.     Education Record     Learner:  Patient  Barriers / Limitations:  Language  Method:  Discussion and Printed material  Education / instructions given:  Medication, schedule, plan of care  Outcome:  Shows understanding     Discharged Home, Ambulating independently, accompanied by:Self     Patient/family verbalized understanding of future appointments: by printed AVS

## 2025-07-17 NOTE — PROGRESS NOTES
HPI   Cole Kendrick is a 60 year old male here for follow up of   Encounter Diagnoses   Name Primary?    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  This is a synopsis of those records, details can be found on Care Everywhere.  Patient had presented at [facility] in [facility] March 2021 due to a mass of the right shoulder.  At that time he underwent imaging with an MRI which showed a superficial right upper arm mass which appeared to be veins within the dermis.  It measured 3.9 cm x 8.3 cm x 5.9 cm it was felt that there was likely internal process of hemorrhage within the mass.  The mass demonstrated significant enhancement and it extended to the involved adjacent thickened areas of the dermis.  This mass was abutting the deltoid muscle and mildly effacing the deltoid muscle but not invading it.  There is also evidence of 13 mm short axis diameter right axillary lymph node which appeared to have a thickened cortex and was felt to be abnormal.  Dedicated ultrasound of the right axilla was recommended for further evaluation.  There were other smaller lymph nodes adjacent to the area.  Patient subsequently underwent right axillary lymph node ultrasound guided biopsy, the area contained multiple and large lymph nodes which were measuring between 3.5 and 3.2 cm.  There is at least 3 of them.  Pathology report showed lymph node tissue showing features consistent with nonspecific reactive lymphoid hyperplasia, there is no evidence of metastatic disease, nor a lymphoproliferative process or malignancy.     Patient was then referred to cancer center at [facility] in [facility], and was evaluated by her surgical oncologist Dr. Fabián Dawn.  Per review of  his consultation report, the patient had stated he had noted a new (mole) on the right arm 9 months prior to his original presentation and had enlarged over several months and began to bleed.  It was not painful and was not causing any neuromuscular issues.  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  We did discuss causes the results of the MRI above.  I was discussed for the patient to proceed with a radical resection of the right upper arm mass with placement of the wound VAC after performing the wide excision of the mass.  Surgical resection was performed on 5/26/2021 with en bloc resection of 12 cm with partial resection of the underlying right deltoid muscle.  Final wound measured 15 cm in diameter by 4 cm deep, and a wound VAC in place.  The pathology report showed that this was consistent with a malignant melanoma that measured 10.5 cm and was focally in the epidermis abutting ulcer with minute focus of possible melanoma in situ.  BRAF V600E was positive.  As the tumor staging summary, the histologic type was not determined.  The Breslow depth could not be determined.  There was ulceration present.  The mitotic rate was greater than 1/millimeter square.  Lymphovascular invasion was present.  Tumor infiltrating lymphocytes were present but nonbrisk.  There is no tumor regression noted.  The peripheral margins, and the deep margins were negative for invasive melanoma.  Tumor was 15 mm from the closest peripheral margin and 10 mm from the closest deep margin.  Plastic surgery was consulted for wound closure with skin grafting.  Patient underwent skin grafting on 6/8/2021.  Donor site was from the right thigh.     Patient was then seeking oncologic care in [facility] in [facility].  The records from that organization are not available on  Care Everywhere.  The patient states that he was receiving treatment with the local oncologist, Dr. Arce, with about 8 pills a day he was taking twice a day.  He states that he would pick them up at his doctor's office.  He states that he had had a PET scan which is showed positive lymph nodes in his right axilla.  On records available in care everywhere on 3/3/2022, there was a CT scan of the chest, abdomen and pelvis with contrast, which was compared to a PET/CT from September 16, 2021.  This states that on the prior PET/CT from September 2021 there had been enlarged right axillary lymphadenopathy that had measured 2.6 x 2.1 cm, and on the current study from March 3, 2022 it had resolved.  There was no other evidence seen of metastatic disease.  There was a 1 cm flash enhancing lesion in the right anterior lobe of the liver which was felt to be a hemangioma.     Then on July 25, 2022, it was noted that the patient had iron deficiency anemia.  At that time, on the note from the infusion center on 9/8/2022, at which time the patient was receiving Venofer infusions, the only medication listed on his med list was tramadol.  He did receive infusion of 300 mg of Venofer x6 doses as an outpatient.  Last dose was given on 10/27/2022.     On November 7, 2022, the patient received a blood transfusion for hemoglobin of 6.2, transfusion occurred on 11/8/2022.  Patient was then admitted on 12/21/2023 and discharged on 12/23/2023 to [facility], due to secondary acute anemia from blood loss secondary to GI bleed.  At that time he had presented with several weeks of dizziness, weakness, and left lower extremity swelling.  His oncologist recommended he go to the ED for evaluation.  He did a left lower extremity Doppler which was negative for DVT.  Hemoglobin at the time was 3.2.  Patient had complained of several weeks of painless red-maroon or black-colored stools which were not often.  He denied any  abdominal pain.  He denied any nausea or vomiting.  Patient received a total of 6 units of packed red cells and underwent an EGD which revealed 5 gastric ulcers with no active bleeding.  He was placed on 3 months of PPI therapy twice daily and was recommended for repeat EGD in 2 months.  At that time, the only medication listed was metformin.   Patient was then readmitted on 1/4/2023 and discharged on 1/6/2023, due to a recurrent GI bleed.  At that time, he had reported an abrupt onset of a sensation of fatigue, diaphoresis and dizziness, and then started having dark stools.  When he presented to the ED, with hypotension and hemoglobin of 8.1.  There was concern for recurrent GI bleed, however there is no evidence of overt GI bleed.  He was transfused 2 units of packed red cells, with hemoglobin up to 9.3 on day of discharge.  It was not recommended to repeat endoscopy at the time.  He was recommended to continue with the pantoprazole twice daily.  He had also had a tagged red cell scan during the first admission which was negative.       Patient presented back to his oncologist on 01/11/2023, with a hemoglobin of 6.5.  On 1/12/2023 he was then transfused 2 units of packed red cells at the outpatient infusion center at [facility].  Subsequent hemoglobin on 1/19/2023 was 7.1.  She did have repeat iron studies on 1/23/2023, with a ferritin of 12, percent iron saturation of 4%, LDH elevated to 364, with a total bilirubin of 0.4.  He again was managed with Venofer 300 mg infusions weekly x9 weeks.  With last dose administered on 5/4/2023.     Patient then had PET/CT on 2/02/2023, which at that time showed 2 thickened hypermetabolic small bowel loops with SUV max of 11.07 and 13.72.  They have felt that these findings were suspicious for an intussusception and a CT scan of the abdomen pelvis was recommended with and without contrast.  They still noted that there was no right axillary lymphadenopathy.     Patient  was then readmitted on 5/17/2023 through 5/18/2023, again due to acute on chronic blood loss anemia and received 2 units of packed red cells.  It was suspected to be an upper GI bleed and was referred for outpatient capsule endoscopy.  At the time of presentation on 5/17/2023 he had offered a 4-day history of black stools and dizziness in the morning.  When he presented to the ED his hemoglobin was 5.7.  After transfusion of 2 units of packed red cells his hemoglobin was up to 7.1.  Last hemoglobin on file as outpatient on 5/25/2023 was 8.4 with ferritin of 11.     Presented to [facility] on 6/28/23 with c/o weakness, epigastric pain and melanotic stools.  Hgb 4.6.  PMH c/w DM, gastric ulcers with h/o GI bleed s/p IV iron.  He underwent an EGD which did not show any evidence of upper GI bleeding. There were 2 small superficial ulcers/erosions in the duodenal bulb which were not felt to be the cause of the profound anemia with hemoglobin of less than 5 on admission. Patient was recommended to have a CT enterography due to PET/CT on 2/2/2023 with 2 thickened hypermetabolic masses and small bowel which were felt to be secondary to intussusception. Capsule endoscopy was to be considered pending results. CT enterography was performed on 6/29/2023, which showed an 8.7 cm area of masslike thickening involving a loop of small bowel within the right lower quadrant. There was an additional 7 cm area of masslike thickening involving a loop of small bowel within the left lower quadrant. These findings were felt to be suspicious of metastatic disease to small bowel given best history of melanoma.     S/p en bloc small bowel resection and [facility] on 6/30/23 - two separate foci of metastatic melanoma, 7.5 cm and 6.5 cm, BRAF mutant.      S/p cycle 4 Ipilimumab with 3 mg/Nivolumab 1 mg.  Tolerated very well.    Currently s/p cycle 32 single agent nivolumab now at 480 mg every 4 weeks. Tolerating well and reports  Quality 130: Documentation Of Current Medications In The Medical Record: Current Medications Documented no side effects or acute complaints.      Fatigue:  No    Fevers:  No    Appetite/taste changes:  No    Mucositis:  No    Weight changes:  No    Nausea/vomiting:  No    Diarrhea:  No    Constipation:  No    Peripheral neuropathy:  No    Follows with his PCP.  Was not started on DM meds.  Monitoring BP.    Lives in [place], [place].      ECOG PS 0    Review of Systems:   ROS reviewed and negative except as dictated per HPI       No current outpatient medications on file.     Allergies:   No Known Allergies    Past Medical History:    Anemia    with multiple blood transfusion    GIB (gastrointestinal bleeding)    Melanoma (HCC)    right shoulder s/p surgical excision and skin graft, adjuvant BRAF/MEK inhibitors for 1.5 yrs    Personal history of antineoplastic chemotherapy     Past Surgical History:   Procedure Laterality Date    Other surgical history      small intestine surgery    Upper gi endoscopy,exam  12/01/2022     Social History     Socioeconomic History    Marital status: Single   Tobacco Use    Smoking status: Never    Smokeless tobacco: Never   Vaping Use    Vaping status: Never Used   Substance and Sexual Activity    Alcohol use: Not Currently    Drug use: Never       No family history on file.      PHYSICAL EXAM:    /74 (BP Location: Right arm, Patient Position: Sitting, Cuff Size: large)   Pulse 55   Temp 97.4 °F (36.3 °C) (Tympanic)   Resp 18   Ht 1.702 m (5' 7\")   Wt 105.2 kg (232 lb)   SpO2 97%   BMI 36.34 kg/m²   Wt Readings from Last 6 Encounters:   07/17/25 105.2 kg (232 lb)   06/19/25 106.1 kg (234 lb)   05/22/25 106.4 kg (234 lb 9.6 oz)   04/24/25 106.1 kg (234 lb)   03/27/25 107 kg (236 lb)   02/27/25 108.4 kg (239 lb)     Physical Exam  General: Patient is alert, not in acute distress.    HEENT: EOMs intact.   Neck: No JVD.   Chest: CTAB, normal work of breathing  Heart: bradycardia, S1 and S2 heard  Abdomen: Soft, non tender with good bowel sounds  Extremities: No edema.  Neurological:  Detail Level: Generalized Grossly intact.   Psych/Depression: nl        ASSESSMENT/PLAN:     1. Malignant melanoma of right upper extremity including shoulder (HCC)    2. 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 Encounter for antineoplastic immunotherapy            Malignant melanoma of right upper extremity including shoulder (HCC)  Proceed with treatment with nivolumab and ipilimumab x 4 cycles followed by maintenance nivolumab for total of 18 to 24 months.  Discussed goal of treatment is still potentially curative with these agents.      S/p 4 cycles Ipilimumab 3 mg/Nivolumab 1 mg.      Tumor assessment after the fourth cycle of treatment with marked response to therapy.  Tumor assessment after cycle 10 with very minimal activity in the LN in the abdomen.  Most recent restaging study was April 11, 2024 with stable mesenteric lymph node in the right mid abdomen with mild FDG activity compatible with stable treated disease.  Also patient had nonenlarged right paratracheal node measuring 7 mm which is mildly active which was likely reactive.  This will be followed on upcoming PET/CT 2024.    S/p cycle 30 maintenance nivolumab 480mg every 4 weeks.       Proceed with cycle 33 today.  Given that the patient lives far, and FDA approved to the 480 mg every 4 weeks nivolumab, will switch his completion of his treatment to this dosing and schedule given his distance to the treatment center.    Restaging PET/CT on 10/24/2024 with no evidence of disease.  He has a borderline right paratracheal lymphadenopathy which is mildly hypermetabolic and is unchanged.      Reviewed PET scan stable Jan 2025.      PET scan June 2025 stable      Recommend weight loss.     HTN/DM/high cholesterol; discussed with patient.  He will follow with PCP if further issues.  Sent message to PCP about lack of insurance and prescribing meds he can afford and use of programs to cover these. .    Applying for assistance for metformin thru PCP.     Goal is to complete a  total of 18 months to 24 months of treatment from initiation of therapy.  24 months will be in August of 2025.      Call prn.  Follow-up in 4 weeks    MDM: high risk    Results From Past 48 Hours:  Recent Results (from the past 48 hours)   CBC W Differential W Platelet    Collection Time: 07/17/25  7:32 AM   Result Value Ref Range    WBC 5.3 4.0 - 11.0 x10(3) uL    RBC 4.74 4.30 - 5.70 x10(6)uL    HGB 15.4 13.0 - 17.5 g/dL    HCT 44.6 39.0 - 53.0 %    MCV 94.1 80.0 - 100.0 fL    MCH 32.5 26.0 - 34.0 pg    MCHC 34.5 31.0 - 37.0 g/dL    RDW-SD 42.4 35.1 - 46.3 fL    RDW 12.1 11.0 - 15.0 %    .0 150.0 - 450.0 10(3)uL    Neutrophil Absolute Prelim 2.35 1.50 - 7.70 x10 (3) uL    Neutrophil Absolute 2.35 1.50 - 7.70 x10(3) uL    Lymphocyte Absolute 2.17 1.00 - 4.00 x10(3) uL    Monocyte Absolute 0.59 0.10 - 1.00 x10(3) uL    Eosinophil Absolute 0.05 0.00 - 0.70 x10(3) uL    Basophil Absolute 0.08 0.00 - 0.20 x10(3) uL    Immature Granulocyte Absolute 0.01 0.00 - 1.00 x10(3) uL    Neutrophil % 44.8 %    Lymphocyte % 41.3 %    Monocyte % 11.2 %    Eosinophil % 1.0 %    Basophil % 1.5 %    Immature Granulocyte % 0.2 %   Comp Metabolic Panel (14)    Collection Time: 07/17/25  7:32 AM   Result Value Ref Range    Glucose 244 (H) 70 - 99 mg/dL    Sodium 137 136 - 145 mmol/L    Potassium 4.4 3.5 - 5.1 mmol/L    Chloride 102 98 - 112 mmol/L    CO2 28.0 21.0 - 32.0 mmol/L    Anion Gap 7 0 - 18 mmol/L    BUN 14 9 - 23 mg/dL    Creatinine 0.92 0.70 - 1.30 mg/dL    BUN/CREA Ratio 15.2 10.0 - 20.0    Calcium, Total 9.0 8.7 - 10.4 mg/dL    Calculated Osmolality 293 275 - 295 mOsm/kg    eGFR-Cr 95 >=60 mL/min/1.73m2    ALT 33 10 - 49 U/L    AST 23 <34 U/L    Alkaline Phosphatase 86 45 - 117 U/L    Bilirubin, Total 0.6 0.2 - 1.1 mg/dL    Total Protein 6.8 5.7 - 8.2 g/dL    Albumin 4.4 3.2 - 4.8 g/dL    Globulin  2.4 2.0 - 3.5 g/dL    A/G Ratio 1.8 1.0 - 2.0    Patient Fasting for CMP? Patient not present    TSH W REFLEX TO FREE T4  [E]    Collection Time: 07/17/25  7:32 AM   Result Value Ref Range    TSH 4.306 0.550 - 4.780 uIU/mL

## 2025-08-14 ENCOUNTER — NURSE ONLY (OUTPATIENT)
Facility: LOCATION | Age: 60
End: 2025-08-14
Attending: INTERNAL MEDICINE

## 2025-08-14 ENCOUNTER — OFFICE VISIT (OUTPATIENT)
Facility: LOCATION | Age: 60
End: 2025-08-14
Attending: INTERNAL MEDICINE

## 2025-08-14 ENCOUNTER — OFFICE VISIT (OUTPATIENT)
Facility: LOCATION | Age: 60
End: 2025-08-14
Attending: NURSE PRACTITIONER

## 2025-08-14 VITALS
BODY MASS INDEX: 36 KG/M2 | HEART RATE: 56 BPM | DIASTOLIC BLOOD PRESSURE: 84 MMHG | TEMPERATURE: 98 F | OXYGEN SATURATION: 97 % | WEIGHT: 232 LBS | SYSTOLIC BLOOD PRESSURE: 145 MMHG | RESPIRATION RATE: 18 BRPM

## 2025-08-14 DIAGNOSIS — Z51.12 ENCOUNTER FOR ANTINEOPLASTIC IMMUNOTHERAPY: ICD-10-CM

## 2025-08-14 DIAGNOSIS — C43.61 MALIGNANT MELANOMA OF RIGHT UPPER EXTREMITY INCLUDING SHOULDER (HCC): Primary | ICD-10-CM

## 2025-08-14 LAB
ALBUMIN SERPL-MCNC: 4.9 G/DL (ref 3.2–4.8)
ALBUMIN/GLOB SERPL: 1.9 (ref 1–2)
ALP LIVER SERPL-CCNC: 88 U/L (ref 45–117)
ALT SERPL-CCNC: 38 U/L (ref 10–49)
ANION GAP SERPL CALC-SCNC: 8 MMOL/L (ref 0–18)
AST SERPL-CCNC: 35 U/L (ref ?–34)
BASOPHILS # BLD AUTO: 0.07 X10(3) UL (ref 0–0.2)
BASOPHILS NFR BLD AUTO: 1.2 %
BILIRUB SERPL-MCNC: 0.6 MG/DL (ref 0.2–1.1)
BUN BLD-MCNC: 13 MG/DL (ref 9–23)
BUN/CREAT SERPL: 13.8 (ref 10–20)
CALCIUM BLD-MCNC: 9.4 MG/DL (ref 8.7–10.4)
CHLORIDE SERPL-SCNC: 100 MMOL/L (ref 98–112)
CO2 SERPL-SCNC: 25 MMOL/L (ref 21–32)
CREAT BLD-MCNC: 0.94 MG/DL (ref 0.7–1.3)
DEPRECATED RDW RBC AUTO: 42 FL (ref 35.1–46.3)
EGFRCR SERPLBLD CKD-EPI 2021: 93 ML/MIN/1.73M2 (ref 60–?)
EOSINOPHIL # BLD AUTO: 0.04 X10(3) UL (ref 0–0.7)
EOSINOPHIL NFR BLD AUTO: 0.7 %
ERYTHROCYTE [DISTWIDTH] IN BLOOD BY AUTOMATED COUNT: 12 % (ref 11–15)
GLOBULIN PLAS-MCNC: 2.6 G/DL (ref 2–3.5)
GLUCOSE BLD-MCNC: 286 MG/DL (ref 70–99)
HCT VFR BLD AUTO: 46.9 % (ref 39–53)
HGB BLD-MCNC: 16.2 G/DL (ref 13–17.5)
IMM GRANULOCYTES # BLD AUTO: 0.01 X10(3) UL (ref 0–1)
IMM GRANULOCYTES NFR BLD: 0.2 %
LYMPHOCYTES # BLD AUTO: 2.34 X10(3) UL (ref 1–4)
LYMPHOCYTES NFR BLD AUTO: 40.8 %
MCH RBC QN AUTO: 32.8 PG (ref 26–34)
MCHC RBC AUTO-ENTMCNC: 34.5 G/DL (ref 31–37)
MCV RBC AUTO: 94.9 FL (ref 80–100)
MONOCYTES # BLD AUTO: 0.5 X10(3) UL (ref 0.1–1)
MONOCYTES NFR BLD AUTO: 8.7 %
NEUTROPHILS # BLD AUTO: 2.78 X10 (3) UL (ref 1.5–7.7)
NEUTROPHILS # BLD AUTO: 2.78 X10(3) UL (ref 1.5–7.7)
NEUTROPHILS NFR BLD AUTO: 48.4 %
OSMOLALITY SERPL CALC.SUM OF ELEC: 287 MOSM/KG (ref 275–295)
PLATELET # BLD AUTO: 214 10(3)UL (ref 150–450)
POTASSIUM SERPL-SCNC: 4.6 MMOL/L (ref 3.5–5.1)
PROT SERPL-MCNC: 7.5 G/DL (ref 5.7–8.2)
RBC # BLD AUTO: 4.94 X10(6)UL (ref 4.3–5.7)
SODIUM SERPL-SCNC: 133 MMOL/L (ref 136–145)
TSI SER-ACNC: 2.35 UIU/ML (ref 0.55–4.78)
WBC # BLD AUTO: 5.7 X10(3) UL (ref 4–11)

## (undated) DEVICE — FLEXIBLE YANKAUER,MEDIUM TIP, NO VACUUM CONTROL: Brand: ARGYLE

## (undated) DEVICE — YANKAUER SUCTION INSTRUMENT NO CONTROL VENT, BULB TIP, CLEAR: Brand: YANKAUER

## (undated) DEVICE — SYSTEM SURGYPAD VELCRO 36IN

## (undated) DEVICE — CLIP INTERNAL HORIZON MED

## (undated) DEVICE — GOWN SURG XL DISP LEV 3 AERO BLU RAGLAN SL

## (undated) DEVICE — KIT CLEAN ENDOKIT 1.1OZ GOWNX2

## (undated) DEVICE — DRAPE CASSETTE X-RAY

## (undated) DEVICE — SUTURE PROL SZ 4-0 L18IN NONABSORB BLU

## (undated) DEVICE — SUT PDS II 3-0 SH Z316H

## (undated) DEVICE — THE ECHELON, ECHELON ENDOPATH™ AND ECHELON FLEX™ FAMILIES OF ENDOSCOPIC LINEAR CUTTERS AND RELOADS ARE STERILE, SINGLE PATIENT USE INSTRUMENTS THAT SIMULTANEOUSLY CUT AND STAPLE TISSUE. THERE ARE SIX STAGGERED ROWS OF STAPLES, THREE ON EITHER SIDE OF THE CUT LINE. THE 45 MM INSTRUMENTS HAVE A STAPLE LINE THATIS APPROXIMATELY 45 MM LONG AND A CUT LINE THAT IS APPROXIMATELY 42 MM LONG. THE SHAFT CAN ROTATE FREELY IN BOTH DIRECTIONS AND AN ARTICULATION MECHANISM ON ARTICULATING INSTRUMENTS ENABLES BENDING THE DISTAL PORTIONOF THE SHAFT TO FACILITATE LATERAL ACCESS OF THE OPERATIVE SITE.THE INSTRUMENTS ARE SHIPPED WITHOUT A RELOAD AND MUST BE LOADED PRIOR TO USE. A STAPLE RETAINING CAP ON THE RELOAD PROTECTS THE STAPLE LEG POINTS DURING SHIPPING AND TRANSPORTATION. THE INSTRUMENTS’ LOCK-OUT FEATURE IS DESIGNED TO PREVENT A USED RELOAD FROM BEING REFIRED.: Brand: ECHELON ENDOPATH

## (undated) DEVICE — SUT PDS II 1-0 XLH Z881G

## (undated) DEVICE — 60 ML SYRINGE REGULAR TIP: Brand: MONOJECT

## (undated) DEVICE — SOLUTION IRRIG 1000ML 0.9% NACL USP BTL

## (undated) DEVICE — SUTURE CHROMIC GUT SZ 3-0 L27IN ABSRB UD

## (undated) DEVICE — CABLE BPLR L12FT FLYING LD DISP

## (undated) DEVICE — HARMONIC 1100 SHEARS, 20CM SHAFT LENGTH: Brand: HARMONIC

## (undated) DEVICE — CAUTERY: TIP CLEANER XR 100/CS: Brand: MEDICAL ACTION INDUSTRIES

## (undated) DEVICE — CLIP LG INTNL HMCLP TNTLM ESCP

## (undated) DEVICE — PROXIMATE SKIN STAPLERS (35 WIDE) CONTAINS 35 STAINLESS STEEL STAPLES (FIXED HEAD): Brand: PROXIMATE

## (undated) DEVICE — GAMMEX® NON-LATEX PI ORTHO SIZE 7.5, STERILE POLYISOPRENE POWDER-FREE SURGICAL GLOVE: Brand: GAMMEX

## (undated) DEVICE — PROXIMATE RH ROTATING HEAD SKIN STAPLERS (35 WIDE) CONTAINS 35 STAINLESS STEEL STAPLES: Brand: PROXIMATE

## (undated) DEVICE — TOWEL SURG OR 17X30IN BLUE

## (undated) DEVICE — CLIP SM INTNL HMCLP TNTLM ESCP

## (undated) DEVICE — GAMMEX® PI HYBRID SIZE 8, STERILE POWDER-FREE SURGICAL GLOVE, POLYISOPRENE AND NEOPRENE BLEND: Brand: GAMMEX

## (undated) DEVICE — SUT SILK 3-0 SH C013D

## (undated) DEVICE — KIT ENDO ORCAPOD 160/180/190

## (undated) DEVICE — Device: Brand: DUAL NARE NASAL CANNULAE FEMALE LUER CON 7FT O2 TUBE

## (undated) DEVICE — SOL NACL IRRIG 0.9% 1000ML BTL

## (undated) DEVICE — DRAPE SLUSH WARMER 44X66

## (undated) DEVICE — PROVIDES A STERILE INTERFACE BETWEEN THE OPERATING ROOM SURGICAL LAMPS (NON-STERILE) AND THE SURGEON OR NURSE (STERILE).: Brand: STERION®CLAMP COVER FABRIC

## (undated) DEVICE — SUT SILK 0 A306H

## (undated) DEVICE — SUT PROLENE 2-0 SH 8533H

## (undated) DEVICE — SUCTION CANISTER, 3000CC,SAFELINER: Brand: DEROYAL

## (undated) DEVICE — APPLICATOR SKIN PREP 26ML HI LT ORNG 2% CHG

## (undated) DEVICE — INTENDED TO BE USED TO OCCLUDE, RETRACT AND IDENTIFY ARTERIES, VEINS, TENDONS AND NERVES IN SURGICAL PROCEDURES: Brand: STERION®  VESSEL LOOP

## (undated) DEVICE — VIOLET BRAIDED (POLYGLACTIN 910), SYNTHETIC ABSORBABLE SUTURE: Brand: COATED VICRYL

## (undated) DEVICE — WOUND RETRACTOR AND PROTECTOR: Brand: ALEXIS O WOUND PROTECTOR-RETRACTOR

## (undated) DEVICE — SUT SILK 2-0 BLK 17-18 45CM

## (undated) DEVICE — ABSORBABLE HEMOSTAT (OXIDIZED REGENERATED CELLULOSE, U.S.P.): Brand: SURGICEL

## (undated) DEVICE — THE ECHELON FLEX POWERED PLUS ARTICULATING ENDOSCOPIC LINEAR CUTTERS ARE STERILE, SINGLE PATIENT USE INSTRUMENTS THAT SIMULTANEOUSLYCUT AND STAPLE TISSUE. THERE ARE SIX STAGGERED ROWS OF STAPLES, THREE ON EITHER SIDE OF THE CUT LINE. THE ECHELON FLEX 45 POWERED PLUSINSTRUMENTS HAVE A STAPLE LINE THAT IS APPROXIMATELY 45 MM LONG AND A CUT LINE THAT IS APPROXIMATELY 42 MM LONG. THE SHAFT CAN ROTATE FREELYIN BOTH DIRECTIONS AND AN ARTICULATION MECHANISM ENABLES THE DISTAL PORTION OF THE SHAFT TO PIVOT TO FACILITATE LATERAL ACCESS TO THE OPERATIVESITE.THE INSTRUMENTS ARE PACKAGED WITH A PRIMARY LITHIUM BATTERY PACK THAT MUST BE INSTALLED PRIOR TO USE. THERE ARE SPECIFIC REQUIREMENTS FORDISPOSING OF THE BATTERY PACK. REFER TO THE BATTERY PACK DISPOSAL SECTION.THE INSTRUMENTS ARE PACKAGED WITHOUT A RELOAD AND MUST BE LOADED PRIOR TO USE. A STAPLE RETAINING CAP ON THE RELOAD PROTECTS THE STAPLE LEGPOINTS DURING SHIPPING AND TRANSPORTATION. THE INSTRUMENTS’ LOCK-OUT FEATURE IS DESIGNED TO PREVENT A USED OR IMPROPERLY INSTALLED RELOADFROM BEING REFIRED OR AN INSTRUMENT FROM BEING FIRED WITHOUT A RELOAD.: Brand: ECHELON FLEX

## (undated) DEVICE — TRAY SURESTEP 16 BARDEX DRAIN

## (undated) DEVICE — SUTURE SILK 3-0 SA64H

## (undated) DEVICE — SUTURE VCRL SZ 3-0 L27IN ABSRB UD L26MM SH

## (undated) DEVICE — NECK ACCESSORY: Brand: MEDLINE INDUSTRIES, INC.

## (undated) DEVICE — CURAD NONADHERANT PAD 3X8

## (undated) DEVICE — A P RESECTION: Brand: MEDLINE INDUSTRIES, INC.

## (undated) DEVICE — SUT SILK 3-0 SA64H

## (undated) DEVICE — CLIP MED INTNL HMCLP TNTLM

## (undated) DEVICE — TAPE DRSG ELSTKN 5YDX4IN ADH

## (undated) DEVICE — CONMED SCOPE SAVER BITE BLOCK, 20X27 MM: Brand: SCOPE SAVER

## (undated) DEVICE — GAMMEX® PI HYBRID SIZE 7, STERILE POWDER-FREE SURGICAL GLOVE, POLYISOPRENE AND NEOPRENE BLEND: Brand: GAMMEX

## (undated) DEVICE — DRAPE SHEET LAPCHOLE 124X100X7

## (undated) DEVICE — SPONGE: SPECIALTY PEANUT XR 100/CS: Brand: MEDICAL ACTION INDUSTRIES

## (undated) DEVICE — HEAD & NECK: Brand: MEDLINE INDUSTRIES, INC.

## (undated) DEVICE — 3M™ BAIR HUGGER® UNDERBODY BLANKET, FULL ACCESS, 10 PER CASE 63500: Brand: BAIR HUGGER™

## (undated) DEVICE — SURGICAL SUCTION CONNECTING TUBE WITH MALE CONNECTOR AND SUCTION CLAMP, 2 FT. LONG (.6 M), 5 MM I.D.: Brand: CONMED

## (undated) DEVICE — SUT PROLENE 4-0 RB-1 8557H

## (undated) NOTE — LETTER
Chayito Armstrong [address], [place], [place]  [address]  INFORMED CONSENT FOR TRANSFUSION OF BLOOD OR BLOOD PRODUCTS  My physician has informed me of the nature, purpose, benefits and risks of transfusion for blood and blood components that he/she may deem necessary during my treatment or hospitalization. He/she has also discussed alternatives to receiving blood from the voluntary blood supply with me, such as self-donation (autologous) and directed donation (blood donated by family or friends to be used specifically for me). I further understand that while the [address] [place] [address] will attempt to supply any autologous or directed donor blood prior to transfusion of blood from the routine blood supply, medical circumstances may require that other or additional blood components may be required for my care. In giving consent, I acknowledge that my physician has also informed me that despite careful screening and testing in accordance with national and regional regulations, there is still a small risk of transmission of infectious agents including hepatitis, HIV-1/2, cytomegalovirus and other viruses or diseases as yet unknown for which licensed definitive screening tests do not currently exist. Additionally, my physician has informed me of the potential for transfusion reactions not related to an infectious agent. [  ]  Check here for Recurring Outpatient Transfusion Therapy (valid for 1 year) In addition to the above, my physician has informed me that I shall receive numerous transfusions over a period of time and that these can lead to other increased risks. I hereby authorize the transfusion of blood and/or blood products to me as deemed necessary and ordered by physicians participating in my care.  My physician has given me the opportunity to ask questions and any questions asked have been answered to my satisfaction  __________________________________________ ______________________________________________  (Signature of Patient)                                                            (Responsible party in case of Minor,                                                                                                 Incompetent, or unconscious Patient)  ___________________________________________       ________ ______________________________________  (Relationship to Patient)                                                       (Signature of Witness)  ______________________________________________________________________________________________   (Date)                                                                           (Time)  REFUSAL OF CONSENT FOR BLOOD TRANSFUSIONS   Sign only if Refusing   [  ] I understand refusal of blood or blood products as deemed necessary by my physician may have serious consequences to my condition to include possible death.  I hereby assume responsibility for my refusal and release the hospital, its personnel, and my physicians from any responsibility for the consequences of my refusal.    ________________________________________________________________________________  (Signature of Patient)                                                         (Responsible Party/Relationship to Patient)    ________________________________________________________________________________  (Signature of Witness)                                                       (Date/Time)     Patient Name: Juan Antonio Loaiza     : 1965                 Printed: 2023      Medical Record #: [id number]                                 Page 1 of 1

## (undated) NOTE — LETTER
11/7/2024              Cole Kendrick        [address]        [facility] [address]         To Whom It May Concern,      The above named is my patient.  He has been and will continue to be undergoing treatment for metastatic melanoma.      Sincerely,        Juan Carlos Blanco M.D.  [facility]  Associate Medical Director  [place]  [address]  [phone]      11/07/24        Document electronically generated by:  Juan Carlos Blanco MD